# Patient Record
Sex: MALE | Race: WHITE | NOT HISPANIC OR LATINO | Employment: FULL TIME | URBAN - METROPOLITAN AREA
[De-identification: names, ages, dates, MRNs, and addresses within clinical notes are randomized per-mention and may not be internally consistent; named-entity substitution may affect disease eponyms.]

---

## 2017-02-21 ENCOUNTER — TRANSFERRED RECORDS (OUTPATIENT)
Dept: FAMILY MEDICINE | Facility: CLINIC | Age: 62
End: 2017-02-21

## 2017-02-21 ENCOUNTER — TELEPHONE (OUTPATIENT)
Dept: FAMILY MEDICINE | Facility: CLINIC | Age: 62
End: 2017-02-21

## 2017-02-21 DIAGNOSIS — M54.50 LUMBAGO: Primary | ICD-10-CM

## 2017-03-06 ENCOUNTER — OFFICE VISIT (OUTPATIENT)
Dept: FAMILY MEDICINE | Facility: CLINIC | Age: 62
End: 2017-03-06

## 2017-03-06 ENCOUNTER — CARE COORDINATION (OUTPATIENT)
Dept: CASE MANAGEMENT | Facility: CLINIC | Age: 62
End: 2017-03-06

## 2017-03-06 VITALS
DIASTOLIC BLOOD PRESSURE: 76 MMHG | SYSTOLIC BLOOD PRESSURE: 142 MMHG | HEART RATE: 66 BPM | WEIGHT: 190 LBS | OXYGEN SATURATION: 98 % | BODY MASS INDEX: 29.32 KG/M2

## 2017-03-06 DIAGNOSIS — M54.5 CHRONIC LOW BACK PAIN, UNSPECIFIED BACK PAIN LATERALITY, WITH SCIATICA PRESENCE UNSPECIFIED: Primary | ICD-10-CM

## 2017-03-06 DIAGNOSIS — G89.29 CHRONIC LOW BACK PAIN, UNSPECIFIED BACK PAIN LATERALITY, WITH SCIATICA PRESENCE UNSPECIFIED: Primary | ICD-10-CM

## 2017-03-06 PROCEDURE — 99213 OFFICE O/P EST LOW 20 MIN: CPT | Performed by: FAMILY MEDICINE

## 2017-03-06 NOTE — MR AVS SNAPSHOT
After Visit Summary   3/6/2017    Mane Chawla    MRN: [id number]           Patient Information     Date Of Birth          1955        Visit Information        Provider Department      3/6/2017 2:15 PM Johnnie Dejesus MD [facility]        Today's Diagnoses     Chronic low back pain, unspecified back pain laterality, with sciatica presence unspecified    -  1       Follow-ups after your visit        Additional Services     PHYSIATRY REFERRAL       Your provider [facility] you [facility]    Please be aware that coverage of these services is subject to the terms and limitations of your health insurance plan.  Call member services at your health plan with any benefit or coverage questions.      Please bring the following to your appointment:  >>   Any x-rays, CTs or MRIs which have been performed.  Contact the facility where they were done to arrange for  prior to your scheduled appointment.    >>   List of current medications   >>   This referral request   >>   Any documents/labs given to you for this referral                  Who to contact     If you have questions or need follow up information about today's clinic visit or your schedule please contact [facility] directly at [phone].  Normal or non-critical lab and imaging results will be communicated to you by MyChart, letter or phone within 4 business days after the clinic has received the results. If you do not hear from us within 7 days, please contact the clinic through PhysioSonicshart or phone. If you have a critical or abnormal lab result, we will notify you by phone as soon as possible.  Submit refill requests through [employer] or call your pharmacy and they will forward the refill request to us. Please allow 3 business days for your refill to be completed.          Additional Information About Your Visit        PhysioSonicshart Information     [employer]Life lets you send messages to your  "doctor, view your test results, renew your prescriptions, schedule appointments and more. To sign up, go to [email or web address] . Click on \"Log in\" on the left side of the screen, which will take you to the Welcome page. Then click on \"Sign up Now\" on the right side of the page.     You will be asked to enter the access code listed below, as well as some personal information. Please follow the directions to create your username and password.     Your access code is: [email or web address]  Expires: 2017  3:09 PM     Your access code will  in 90 days. If you need help or a new code, please call your [place] clinic or [phone].        Care EveryWhere ID     This is your Care EveryWhere ID. This could be used by other organizations to access your [place] medical records  [id number]        Your Vitals Were     Pulse Pulse Oximetry BMI (Body Mass Index)             66 98% 29.32 kg/m2          Blood Pressure from Last 3 Encounters:   17 142/76   16 142/82   11/10/16 158/80    Weight from Last 3 Encounters:   17 86.2 kg (190 lb)   16 83 kg (183 lb)   11/10/16 83.9 kg (185 lb)              We Performed the Following     PHYSIATRY REFERRAL        Primary Care Provider Office Phone # Fax #    Johnnie Dejesus [phone]       [facility] [address] [facility] [address]        Thank you!     Thank you for choosing [facility]  for your care. Our goal is always to provide you with excellent care. Hearing back from our patients is one way we can continue to improve our services. Please take a few minutes to complete the written survey that you may receive in the mail after your visit with us. Thank you!             Your Updated Medication List - Protect others around you: Learn how to safely use, store and throw away your medicines at www.disposemymeds.org.          This list is accurate as of: 3/6/17  3:09 PM.  Always use your most recent med " list.                   Brand Name Dispense Instructions for use    ADVIL PO          CIMETIDINE ACID REDUCER PO      Take 1 tablet by mouth every morning.       diazepam 10 MG tablet    VALIUM    30 tablet    TAKE 1 TABLET BY MOUTH NIGHTLY AS NEEDED FOR SLEEP OR MUSCLE SPASMS       ENBREL SURECLICK 50 MG/ML autoinjector   Generic drug:  Etanercept          fish oil-omega-3 fatty acids 1000 MG capsule      Take 2 g by mouth daily       oxyCODONE-acetaminophen 5-325 MG per tablet    PERCOCET    90 tablet    Take 1 tablet by mouth every 4 hours as needed for moderate to severe pain

## 2017-03-06 NOTE — PROGRESS NOTES
Here for referral to a physician at [facility]. He also had ?'s about applying for disability from the state. 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  >15 min>50% counseling

## 2017-03-07 NOTE — PROGRESS NOTES
Clinic Care Coordination Contact  OUTREACH    Referral Information:  Referral Source: PCP  Reason for Contact: Inquiry about SS disability and Medicare benefits  Care Conference: No     Universal Utilization:   ED Visits in last year: 0  Hospital visits in last year: 0  Last PCP appointment: 03/07/17  Missed Appointments: 0  Concerns: Proper Utilization  Multiple Providers or Specialists: Yes    Clinical Concerns:  Current Medical Concerns:   Patient Active Problem List   Diagnosis     Psoriatic arthritis (H)     Hyperlipidemia LDL goal <160     ACP (advance care planning)     Health Care Home     Midline low back pain without sciatica     Burning mouth syndrome       Current Behavioral Concerns: NA    Education Provided to patient: Social Security administration office near pt's home,  website with online application and helpful information about the process, Diability Specialists, and Disability Linkage Line, CC contact information   Clinical Pathway: None    Medication Management:  Patient understands and is adherent     Functional Status:  Mobility Status: Independent  Equipment Currently Used at Home: none  Transportation: Pt drives      Psychosocial:  Current living arrangement:: I live in a private home with spouse  Financial/Insurance: WC, not discussed, Pt currently working     Resources and Interventions:  Current Resources:  (PT);  (NA)  PAS Number:  (NA)  Senior Linkage Line Referral Placed:  (NA)  Advanced Care Plans/Directives on file:: No     Patient/Caregiver understanding: Patient suffers from chronic back pain and was interested in retiring early and applying for disability. Provided information about the process via e-mail and helpful sites, included  CC contact information if patient has any follow-up questions or needs additional assistance.   Frequency of Care Coordination: as needed     Plan: Pt will review disability information and call with any questions. SW CC to remain available in  clinic if needed.    Glory [facility]  Clinic Care Coordinator  [facility]/ [facility]  [phone]

## 2017-04-06 ENCOUNTER — TRANSFERRED RECORDS (OUTPATIENT)
Dept: FAMILY MEDICINE | Facility: CLINIC | Age: 62
End: 2017-04-06

## 2017-04-11 ENCOUNTER — TRANSFERRED RECORDS (OUTPATIENT)
Dept: FAMILY MEDICINE | Facility: CLINIC | Age: 62
End: 2017-04-11

## 2017-04-18 ENCOUNTER — TRANSFERRED RECORDS (OUTPATIENT)
Dept: FAMILY MEDICINE | Facility: CLINIC | Age: 62
End: 2017-04-18

## 2017-04-27 ENCOUNTER — OFFICE VISIT (OUTPATIENT)
Dept: FAMILY MEDICINE | Facility: CLINIC | Age: 62
End: 2017-04-27

## 2017-04-27 VITALS
WEIGHT: 185 LBS | HEIGHT: 67 IN | BODY MASS INDEX: 29.03 KG/M2 | SYSTOLIC BLOOD PRESSURE: 132 MMHG | OXYGEN SATURATION: 98 % | HEART RATE: 83 BPM | DIASTOLIC BLOOD PRESSURE: 80 MMHG

## 2017-04-27 DIAGNOSIS — M54.50 CHRONIC BILATERAL LOW BACK PAIN WITHOUT SCIATICA: ICD-10-CM

## 2017-04-27 DIAGNOSIS — G89.29 CHRONIC BILATERAL LOW BACK PAIN WITHOUT SCIATICA: ICD-10-CM

## 2017-04-27 PROCEDURE — 99213 OFFICE O/P EST LOW 20 MIN: CPT | Performed by: FAMILY MEDICINE

## 2017-04-27 RX ORDER — OXYCODONE AND ACETAMINOPHEN 5; 325 MG/1; MG/1
1 TABLET ORAL EVERY 4 HOURS PRN
Qty: 90 TABLET | Refills: 0 | Status: SHIPPED | OUTPATIENT
Start: 2017-04-27 | End: 2017-09-05

## 2017-04-27 RX ORDER — MULTIPLE VITAMINS W/ MINERALS TAB 9MG-400MCG
1 TAB ORAL DAILY
COMMUNITY
End: 2019-08-13

## 2017-04-27 NOTE — LETTER
[facility]    04/27/17    Patient: Mane Chawla  YOB: 1955  Medical Record Number: [id number]                                                                  Controlled Substance Agreement  I understand that my care provider has prescribed controlled substances (narcotics, tranquilizers, and/or stimulants) to help manage my condition(s).  I am taking this medicine to help me function or work.  I know that this is strong medicine.  It could have serious side effects and even cause a dependency on the drug.  If I stop these medicines suddenly, I could have severe withdrawal symptoms.    The risks, benefits, and side effects of these medication(s) were explained to me.  I agree that:  1. I will take part in other treatments as advised by my provider.  This may be psychiatry or counseling, physical therapy, behavioral therapy, group treatment, or a referral to a pain clinic.  I will reduce or stop my medicine when my provider tells me to do so.   2. I will take my medicines as prescribed.  I will not change the dose or schedule unless my provider tells me to.  There will be no refills if I  run out early.   I may be contacted at any time without warning and asked to complete a drug test or pill count.   3. I will keep all my appointments at the clinic.  If I miss appointments or fail to follow instructions, my provider may stop my medicine.  4. I will not ask other providers to prescribe controlled substances. And I will not accept controlled substances from other people. If I need another prescribed controlled substance for a new reason, I will notify my provider within one business day.  5. If I enroll in the [place] Medical Marijuana program, I will tell my provider.  I will also sign an agreement to share my medical records with my provider.  6. I will use one pharmacy to fill all of my controlled substance prescriptions.  If my prescription is mailed to my pharmacy, it may take 5  to 7 days for my medicine to be ready.  7. I understand that my provider, clinic care team, and pharmacy can track controlled substance prescriptions from other providers through a central database (prescription monitoring program).  8. I will bring in my list of medications (or my medicine bottles) each time I come to the clinic.  REV-  04/2016                                                                                                                                            Page 1 of 2      [facility]    04/27/17    Patient: Mane Chawla  YOB: 1955  Medical Record Number: [id number]    9. Refills of controlled substances will be made only during office hours.  It is up to me to make sure that I do not run out of my medicines on weekends or holidays.    10. I am responsible for my prescriptions.  If the medicine is lost or stolen, it will not be replaced.   I also agree not to share these medicines with anyone.  11. I agree to not use ANY illegal or recreational drugs.  This includes marijuana, cocaine, bath salts or other drugs.  I agree not to use alcohol unless my provider says I may.  I agree to give urine samples whenever asked.  If I fail to give a urine sample, the provider may stop my medicine.     12. I will tell my nurse or provider right away if I become pregnant or have a new medical problem treated outside of [facility].  13. I understand that this medicine can affect my thinking and judgment.  It may be unsafe for me to drive, use machinery and do dangerous tasks.  I will not do any of these things until I know how the medicine affects me.  If my dose changes, I will wait to see how it affects me.  I will contact my provider if I have concerns about medicine side effects.  I understand that if I do not follow any of the conditions above, my prescriptions or treatment may be stopped.    I agree that my provider, clinic care team, and pharmacy may work with any  city, state or federal law enforcement agency that investigates the misuse, sale, or other diversion of my controlled medicine. I will allow my provider to discuss my care with or share a copy of this agreement with any other treating provider, pharmacy or emergency room where I receive care.  I agree to give up (waive) any right of privacy or confidentiality with respect to these authorizations.   I have read this agreement and have asked questions about anything I did not understand.   ___________________________________    ___________________________  Patient Signature                                                           Date and Time  ___________________________________     ____________________________  Witness                                                                            Date and Time  ___________________________________  Johnnie Dejesus MD  REV-  04/2016                                                                                                                                                                 Page 2 of 2

## 2017-04-27 NOTE — PROGRESS NOTES
"Here for refill of his back pain medications. He has been using appropriately. #90 in 6 months  OBJECTIVE: /80  Pulse 83  Ht 1.702 m (5' 7\")  Wt 83.9 kg (185 lb)  SpO2 98%  BMI 28.98 kg/m2 NAD good insight and mood. Carries himself stiffly  (M54.5,  G89.29) Chronic bilateral low back pain without sciatica  Comment:   Plan: oxyCODONE-acetaminophen (PERCOCET) 5-325 MG per        Tablet #90 use prn        Narcotic agreement signed.       "

## 2017-08-07 ENCOUNTER — CARE COORDINATION (OUTPATIENT)
Dept: CARE COORDINATION | Facility: CLINIC | Age: 62
End: 2017-08-07

## 2017-08-07 NOTE — PROGRESS NOTES
Clinic Care Coordination Contact  OUTREACH    Pt called and left a voicemail requesting that information regarding disability be e-mailed again. E-mail sent to his wife's e-mail address per pt request. JUNIOR PUGA will remain available to pt to answer any questions he may have.    ABIGAIL Chinchilla  Clinic Care Coordinator  [phone]  [email or web address]

## 2017-08-30 ENCOUNTER — TELEPHONE (OUTPATIENT)
Dept: FAMILY MEDICINE | Facility: CLINIC | Age: 62
End: 2017-08-30

## 2017-09-05 ENCOUNTER — OFFICE VISIT (OUTPATIENT)
Dept: FAMILY MEDICINE | Facility: CLINIC | Age: 62
End: 2017-09-05

## 2017-09-05 VITALS
DIASTOLIC BLOOD PRESSURE: 72 MMHG | BODY MASS INDEX: 29.19 KG/M2 | OXYGEN SATURATION: 97 % | HEART RATE: 80 BPM | WEIGHT: 186.4 LBS | SYSTOLIC BLOOD PRESSURE: 140 MMHG | RESPIRATION RATE: 20 BRPM

## 2017-09-05 DIAGNOSIS — G89.29 CHRONIC BILATERAL LOW BACK PAIN WITHOUT SCIATICA: ICD-10-CM

## 2017-09-05 DIAGNOSIS — M54.50 CHRONIC BILATERAL LOW BACK PAIN WITHOUT SCIATICA: ICD-10-CM

## 2017-09-05 DIAGNOSIS — Z23 NEED FOR PROPHYLACTIC VACCINATION AND INOCULATION AGAINST INFLUENZA: Primary | ICD-10-CM

## 2017-09-05 PROCEDURE — 99213 OFFICE O/P EST LOW 20 MIN: CPT | Mod: 25 | Performed by: FAMILY MEDICINE

## 2017-09-05 PROCEDURE — 90686 IIV4 VACC NO PRSV 0.5 ML IM: CPT | Performed by: FAMILY MEDICINE

## 2017-09-05 RX ORDER — OXYCODONE AND ACETAMINOPHEN 5; 325 MG/1; MG/1
1 TABLET ORAL EVERY 4 HOURS PRN
Qty: 90 TABLET | Refills: 0 | Status: SHIPPED | OUTPATIENT
Start: 2017-09-05 | End: 2018-04-03

## 2017-09-05 NOTE — PROGRESS NOTES
Back pain chronic. He uses oxycodone 1/2 to 1 a day. He needs to supplement that with ibuprofen and acetaminophen. He does have a history of esophagitis, and needs to take OTC PPI or H2 blocker. He will have shoulder surgery in October. He apparently did not make an appointment with Dr. Garza, as he thought that Dr. Garza's office would call.  ./72  Pulse 80  Resp 20  Wt 84.6 kg (186 lb 6.4 oz)  SpO2 97%  BMI 29.19 kg/m2 he looks fatigued. Cooperative and insightful.  (Z23) Need for prophylactic vaccination and inoculation against influenza  (primary encounter diagnosis)  Comment:   Plan: FLU VAC, SPLIT VIRUS IM > 3 YO (QUADRIVALENT)         [07796]            (M54.5,  G89.29) Chronic bilateral low back pain without sciatica  Comment: with physical job  Plan: oxyCODONE-acetaminophen (PERCOCET) 5-325 MG per        tablet, Referral to Anthony Garza M.D. - Physical         Medicine & Rehab        Use the oxycodone as before, one daily prn. He still may need disability determination, as I don't think he

## 2017-09-05 NOTE — PROGRESS NOTES
Injectable Influenza Immunization Documentation    1.  Is the person to be vaccinated sick today?  No    2. Does the person to be vaccinated have an allergy to eggs or to a component of the vaccine?  No    3. Has the person to be vaccinated today ever had a serious reaction to influenza vaccine in the past?  No    4. Has the person to be vaccinated ever had Guillain-[place] syndrome?  No     Form completed by Lissette Merrill MA September 5, 2017 11:19 AM

## 2017-09-05 NOTE — MR AVS SNAPSHOT
"              After Visit Summary   9/5/2017    Mane Chawla    MRN: [id number]           Patient Information     Date Of Birth          1955        Visit Information        Provider Department      9/5/2017 11:00 AM Johnnie Dejesus MD [facility]        Today's Diagnoses     Need for prophylactic vaccination and inoculation against influenza    -  1    Chronic bilateral low back pain without sciatica           Follow-ups after your visit        Additional Services     Referral to Anthony Garza M.D. - Physical Medicine & Rehab       Referral to Anthony Garza M.D. - Physical Medicine & Rehab  Phone:  [phone]  Reason for Referral:  consult    Please be aware that coverage of these services is subject to the terms and limitations of your health insurance plan.  Call member services at your health plan with any benefit or coverage questions.                  Who to contact     If you have questions or need follow up information about today's clinic visit or your schedule please contact [facility] directly at [phone].  Normal or non-critical lab and imaging results will be communicated to you by SocialKatyhart, letter or phone within 4 business days after the clinic has received the results. If you do not hear from us within 7 days, please contact the clinic through SocialKatyhart or phone. If you have a critical or abnormal lab result, we will notify you by phone as soon as possible.  Submit refill requests through "[employer] or call your pharmacy and they will forward the refill request to us. Please allow 3 business days for your refill to be completed.          Additional Information About Your Visit        SocialKatyhart Information     "[employer] lets you send messages to your doctor, view your test results, renew your prescriptions, schedule appointments and more. To sign up, go to [email or web address] . Click on \"Log in\" on the left side of the screen, which will take you to the Welcome page. " "Then click on \"Sign up Now\" on the right side of the page.     You will be asked to enter the access code listed below, as well as some personal information. Please follow the directions to create your username and password.     Your access code is: [email or web address]  Expires: 2017 11:45 AM     Your access code will  in 90 days. If you need help or a new code, please call your [place] clinic or [phone].        Care EveryWhere ID     This is your Care EveryWhere ID. This could be used by other organizations to access your [place] medical records  [id number]        Your Vitals Were     Pulse Respirations Pulse Oximetry BMI (Body Mass Index)          80 20 97% 29.19 kg/m2         Blood Pressure from Last 3 Encounters:   17 140/72   17 132/80   17 142/76    Weight from Last 3 Encounters:   17 84.6 kg (186 lb 6.4 oz)   17 83.9 kg (185 lb)   17 86.2 kg (190 lb)              We Performed the Following     FLU VAC, SPLIT VIRUS IM > 3 YO (QUADRIVALENT) [35608]     Referral to Anthony Garza M.D. - Physical Medicine & Rehab          Where to get your medicines      Some of these will need a paper prescription and others can be bought over the counter.  Ask your nurse if you have questions.     Bring a paper prescription for each of these medications     oxyCODONE-acetaminophen 5-325 MG per tablet          Primary Care Provider Office Phone # Fax #    Johnnie Dejesus [phone] [address] [facility] [address]        Equal Access to Services     ANNIA [employer] GroupMATILDE : Hadconnor Coker, yousif gleason, qaybta chungalkenzie benitez. So [facility] [phone].    ATENCIÓN: Si habla español, tiene a morales disposición servicios gratuitos de asistencia lingüística. Llame al [phone].    We comply with applicable federal civil rights laws and [place] laws. We do not discriminate on the basis of race, color, " national origin, age, disability sex, sexual orientation or gender identity.            Thank you!     Thank you for choosing [facility]  for your care. Our goal is always to provide you with excellent care. Hearing back from our patients is one way we can continue to improve our services. Please take a few minutes to complete the written survey that you may receive in the mail after your visit with us. Thank you!             Your Updated Medication List - Protect others around you: Learn how to safely use, store and throw away your medicines at www.disposemymeds.org.          This list is accurate as of: 9/5/17 11:45 AM.  Always use your most recent med list.                   Brand Name Dispense Instructions for use Diagnosis    ADVIL PO      Take 800 mg by mouth 2 times daily        CIMETIDINE ACID REDUCER PO      Take 1 tablet by mouth every morning.        diazepam 10 MG tablet    VALIUM    30 tablet    TAKE 1 TABLET BY MOUTH NIGHTLY AS NEEDED FOR SLEEP OR MUSCLE SPASMS    Encounter for medication refill       ENBREL SURECLICK 50 MG/ML autoinjector   Generic drug:  Etanercept      Inject 50 mg Subcutaneous once a week        fish oil-omega-3 fatty acids 1000 MG capsule      Take 2 g by mouth daily        Multi-vitamin Tabs tablet      Take 1 tablet by mouth daily        oxyCODONE-acetaminophen 5-325 MG per tablet    PERCOCET    90 tablet    Take 1 tablet by mouth every 4 hours as needed for moderate to severe pain    Chronic bilateral low back pain without sciatica       UNABLE TO FIND      MEDICATION NAME: Tumeric

## 2017-09-11 ENCOUNTER — TRANSFERRED RECORDS (OUTPATIENT)
Dept: FAMILY MEDICINE | Facility: CLINIC | Age: 62
End: 2017-09-11

## 2017-09-15 ENCOUNTER — TRANSFERRED RECORDS (OUTPATIENT)
Dept: FAMILY MEDICINE | Facility: CLINIC | Age: 62
End: 2017-09-15

## 2017-09-28 ENCOUNTER — TRANSFERRED RECORDS (OUTPATIENT)
Dept: FAMILY MEDICINE | Facility: CLINIC | Age: 62
End: 2017-09-28

## 2017-10-13 ENCOUNTER — TRANSFERRED RECORDS (OUTPATIENT)
Dept: FAMILY MEDICINE | Facility: CLINIC | Age: 62
End: 2017-10-13

## 2017-10-20 DIAGNOSIS — Z76.0 ENCOUNTER FOR MEDICATION REFILL: ICD-10-CM

## 2017-10-20 RX ORDER — DIAZEPAM 10 MG
TABLET ORAL
Qty: 30 TABLET | Refills: 0 | Status: SHIPPED | OUTPATIENT
Start: 2017-10-20 | End: 2018-03-13

## 2017-10-20 NOTE — TELEPHONE ENCOUNTER
Pending Prescriptions:                       Disp   Refills    diazepam (VALIUM) 10 MG tablet [Pharmacy M*30 tab*0        Sig: TAKE 1 TABLET BY MOUTH EVERY NIGHT AT BEDTIME AS           NEEDED FOR SLEEP OR MUSCLE SPASMS    Last OV 9/5/17  Lab Results   Component Value Date    WBC 7.5 08/23/2016     Lab Results   Component Value Date    RBC 4.46 08/23/2016     Lab Results   Component Value Date    HGB 13.8 08/23/2016     Lab Results   Component Value Date    HCT 40.5 08/23/2016     No components found for: MCT  Lab Results   Component Value Date    MCV 90.7 08/23/2016     Lab Results   Component Value Date    MCH 31.1 08/23/2016     Lab Results   Component Value Date    MCHC 34.2 08/23/2016     No results found for: RDW  Lab Results   Component Value Date     08/23/2016     Last Basic Metabolic Panel:  Lab Results   Component Value Date     07/18/2014      Lab Results   Component Value Date    POTASSIUM 4.7 07/18/2014     Lab Results   Component Value Date    CHLORIDE 103 07/18/2014     Lab Results   Component Value Date    LEIGHA 9.2 07/18/2014     No results found for: CO2  Lab Results   Component Value Date    BUN 17 07/18/2014    BUN 24 07/18/2014     Lab Results   Component Value Date    CR 0.72 07/18/2014     Lab Results   Component Value Date     08/23/2016     Lab Results   Component Value Date    CHOL 262 07/18/2014     Lab Results   Component Value Date    HDL 44 07/18/2014     Lab Results   Component Value Date     07/18/2014     Lab Results   Component Value Date    TRIG 220 07/18/2014     No results found for: CHOLHDLRATIO  No results found for: TSH]

## 2017-11-08 ENCOUNTER — OFFICE VISIT (OUTPATIENT)
Dept: FAMILY MEDICINE | Facility: CLINIC | Age: 62
End: 2017-11-08

## 2017-11-08 VITALS
HEART RATE: 74 BPM | WEIGHT: 186 LBS | DIASTOLIC BLOOD PRESSURE: 96 MMHG | RESPIRATION RATE: 16 BRPM | OXYGEN SATURATION: 98 % | SYSTOLIC BLOOD PRESSURE: 156 MMHG | TEMPERATURE: 98.2 F | BODY MASS INDEX: 29.13 KG/M2

## 2017-11-08 DIAGNOSIS — L40.50 PSORIATIC ARTHRITIS (H): ICD-10-CM

## 2017-11-08 DIAGNOSIS — M75.101 ROTATOR CUFF SYNDROME, RIGHT: ICD-10-CM

## 2017-11-08 DIAGNOSIS — Z01.818 PREOP GENERAL PHYSICAL EXAM: Primary | ICD-10-CM

## 2017-11-08 LAB
% GRANULOCYTES: 72.4 % (ref 42.2–75.2)
HCT VFR BLD AUTO: 42.1 % (ref 39–51)
HEMOGLOBIN: 13.8 G/DL (ref 13.4–17.5)
LYMPHOCYTES NFR BLD AUTO: 20.5 % (ref 20.5–51.1)
MCH RBC QN AUTO: 31 PG (ref 27–31)
MCHC RBC AUTO-ENTMCNC: 32.9 G/DL (ref 33–37)
MCV RBC AUTO: 94.1 FL (ref 80–100)
MONOCYTES NFR BLD AUTO: 7.1 % (ref 1.7–9.3)
PLATELET # BLD AUTO: 201 K/UL (ref 140–450)
RBC # BLD AUTO: 4.47 X10/CMM (ref 4.2–5.9)
WBC # BLD AUTO: 8.8 X10/CMM (ref 3.8–11)

## 2017-11-08 PROCEDURE — 93000 ELECTROCARDIOGRAM COMPLETE: CPT | Performed by: FAMILY MEDICINE

## 2017-11-08 PROCEDURE — 99215 OFFICE O/P EST HI 40 MIN: CPT | Performed by: FAMILY MEDICINE

## 2017-11-08 PROCEDURE — 36415 COLL VENOUS BLD VENIPUNCTURE: CPT | Performed by: FAMILY MEDICINE

## 2017-11-08 PROCEDURE — 85025 COMPLETE CBC W/AUTO DIFF WBC: CPT | Performed by: FAMILY MEDICINE

## 2017-11-08 NOTE — MR AVS SNAPSHOT
After Visit Summary   11/8/2017    Mane Chawla    MRN: [id number]           Patient Information     Date Of Birth          1955        Visit Information        Provider Department      11/8/2017 2:00 PM Johnnie Dejesus MD [facility]        Today's Diagnoses     Preop general physical exam    -  1    Rotator cuff syndrome, right        Psoriatic arthritis (H)          Care Instructions      Before Your Surgery      Call your surgeon if there is any change in your health. This includes signs of a cold or flu (such as a sore throat, runny nose, cough, rash or fever).    Do not smoke, drink alcohol or take over the counter medicine (unless your surgeon or primary care doctor tells you to) for the 24 hours before and after surgery.    If you take prescribed drugs: Follow your doctor s orders about which medicines to take and which to stop until after surgery.    Eating and drinking prior to surgery: follow the instructions from your surgeon    Take a shower or bath the night before surgery. Use the soap your surgeon gave you to gently clean your skin. If you do not have soap from your surgeon, use your regular soap. Do not shave or scrub the surgery site.  Wear clean pajamas and have clean sheets on your bed.           Follow-ups after your visit        Who to contact     If you have questions or need follow up information about today's clinic visit or your schedule please contact [facility] directly at [phone].  Normal or non-critical lab and imaging results will be communicated to you by MyChart, letter or phone within 4 business days after the clinic has received the results. If you do not hear from us within 7 days, please contact the clinic through MyChart or phone. If you have a critical or abnormal lab result, we will notify you by phone as soon as possible.  Submit refill requests through [employer] or call your pharmacy and they will forward the refill  "request to us. Please allow 3 business days for your refill to be completed.          Additional Information About Your Visit        MyChart Information     [employer] lets you send messages to your doctor, view your test results, renew your prescriptions, schedule appointments and more. To sign up, go to [email or web address] . Click on \"Log in\" on the left side of the screen, which will take you to the Welcome page. Then click on \"Sign up Now\" on the right side of the page.     You will be asked to enter the access code listed below, as well as some personal information. Please follow the directions to create your username and password.     Your access code is: [email or web address]  Expires: 2017 10:45 AM     Your access code will  in 90 days. If you need help or a new code, please call your [place] clinic or [phone].        Care EveryWhere ID     This is your Care EveryWhere ID. This could be used by other organizations to access your [place] medical records  [id number]        Your Vitals Were     Pulse Temperature Respirations Pulse Oximetry BMI (Body Mass Index)       74 98.2  F (36.8  C) (Oral) 16 98% 29.13 kg/m2        Blood Pressure from Last 3 Encounters:   17 (!) 156/96   17 140/72   17 132/80    Weight from Last 3 Encounters:   17 84.4 kg (186 lb)   17 84.6 kg (186 lb 6.4 oz)   17 83.9 kg (185 lb)              We Performed the Following     CBC with Diff/Plt ([facility])     EKG 12-lead complete w/read - Clinics        Primary Care Provider Office Phone # Fax #    Johnnie Dejesus [phone] [address] [facility] [address]        Equal Access to Services     DANIEL BAIG : Demarco Coker, yousif gleason, qamanasa kakenzie arciniega. [facility] [phone].    ATENCIÓN: Si habla español, tiene a morales disposición servicios gratuitos de asistencia lingüística. Llame al " [phone].    We comply with applicable federal civil rights laws and [place] laws. We do not discriminate on the basis of race, color, national origin, age, disability, sex, sexual orientation, or gender identity.            Thank you!     Thank you for choosing [facility]  for your care. Our goal is always to provide you with excellent care. Hearing back from our patients is one way we can continue to improve our services. Please take a few minutes to complete the written survey that you may receive in the mail after your visit with us. Thank you!             Your Updated Medication List - Protect others around you: Learn how to safely use, store and throw away your medicines at www.disposemymeds.org.          This list is accurate as of: 11/8/17  4:16 PM.  Always use your most recent med list.                   Brand Name Dispense Instructions for use Diagnosis    ADVIL PO      Take 800 mg by mouth 2 times daily        CIMETIDINE ACID REDUCER PO      Take 1 tablet by mouth every morning.        diazepam 10 MG tablet    VALIUM    30 tablet    TAKE 1 TABLET BY MOUTH EVERY NIGHT AT BEDTIME AS NEEDED FOR SLEEP OR MUSCLE SPASMS    Encounter for medication refill       ENBREL SURECLICK 50 MG/ML autoinjector   Generic drug:  Etanercept      Inject 50 mg Subcutaneous once a week        fish oil-omega-3 fatty acids 1000 MG capsule      Take 2 g by mouth daily        Multi-vitamin Tabs tablet      Take 1 tablet by mouth daily        oxyCODONE-acetaminophen 5-325 MG per tablet    PERCOCET    90 tablet    Take 1 tablet by mouth every 4 hours as needed for moderate to severe pain    Chronic bilateral low back pain without sciatica       UNABLE TO FIND      MEDICATION NAME: Tumeric

## 2017-11-08 NOTE — PROGRESS NOTES
[facility]  [address] [place] [id number] [phone]  Dept: [phone]    PRE-OP EVALUATION:  Today's date: 2017    Mane Chawla (: 1955) presents for pre-operative evaluation assessment as requested by Dr. Colbert .  He requires evaluation and anesthesia risk assessment prior to undergoing surgery/procedure for treatment of Psoriatic arthritis .  Proposed procedure: Clean out of the right shoulder     Date of Surgery/ Procedure:  2017  Time of Surgery/ Procedure: To be to determine   Hospital/Surgical Facility: [facility]   Primary Physician: Johnnie Dejesus  Type of Anesthesia Anticipated: General    Patient has a Health Care Directive or Living Will:  YES     1. NO - Do you have a history of heart attack, stroke, stent, bypass or surgery on an artery in the head, neck, heart or legs?  2. NO - Do you ever have any pain or discomfort in your chest?  3. NO - Do you have a history of  Heart Failure?  4. NO - Are you troubled by shortness of breath when: walking on the level, up a slight hill or at night?  5. NO - Do you currently have a cold, bronchitis or other respiratory infection?  6. NO - Do you have a cough, shortness of breath or wheezing?  7. NO - Do you sometimes get pains in the calves of your legs when you walk?  8. NO - Do you or anyone in your family have previous history of blood clots?  9. NO - Do you or does anyone in your family have a serious bleeding problem such as prolonged bleeding following surgeries or cuts?  10. NO - Have you ever had problems with anemia or been told to take iron pills?  11. NO - Have you had any abnormal blood loss such as black, tarry or bloody stools, or abnormal vaginal bleeding?  12. NO - Have you ever had a blood transfusion?  13. NO - Have you or any of your relatives ever had problems with anesthesia?  14. NO - Do you have sleep apnea, excessive snoring or daytime drowsiness?  15. NO - Do you  have any prosthetic heart valves?  16. NO - Do you have prosthetic joints?          HPI:                                                      Brief HPI related to upcoming procedure: longstanding shoulder pain      62 yo male with history of psoriatic arthritis, hyperlipidemia without know vascular disease    MEDICAL HISTORY:                                                    Patient Active Problem List    Diagnosis Date Noted     Burning mouth syndrome 08/23/2016     Priority: Medium     2/16       Midline low back pain without sciatica 10/26/2015     Priority: Medium     Health Care Home 10/29/2013     Priority: Medium     State Tier Level:  Tier 1   August 25, 2014             ACP (advance care planning)      Priority: Medium     Hyperlipidemia LDL goal <160 06/15/2011     Priority: Medium     Psoriatic arthritis (H)      Priority: Medium      Past Medical History:   Diagnosis Date     ACP (advance care planning) 3-25-13    form given     GERD (gastroesophageal reflux disease)      HTN, goal below 140/80      Hypercholesterolemia      Hyperlipidemia LDL goal <160 6/15/2011     Psoriatic arthritis (H)      Past Surgical History:   Procedure Laterality Date     ARTHROSCOPY KNEE RT/LT       CARPAL TUNNEL RELEASE RT/LT       FUSION CERVICAL ANTERIOR THREE+ LEVELS       FUSION LUMBAR ANTERIOR ONE LEVEL       ROTATOR CUFF REPAIR RT/LT       Current Outpatient Prescriptions   Medication Sig Dispense Refill     diazepam (VALIUM) 10 MG tablet TAKE 1 TABLET BY MOUTH EVERY NIGHT AT BEDTIME AS NEEDED FOR SLEEP OR MUSCLE SPASMS 30 tablet 0     multivitamin, therapeutic with minerals (MULTI-VITAMIN) TABS tablet Take 1 tablet by mouth daily       ENBREL SURECLICK 50 MG/ML autoinjector Inject 50 mg Subcutaneous once a week   0     Ibuprofen (ADVIL PO) Take 800 mg by mouth 2 times daily        CIMETIDINE ACID REDUCER PO Take 1 tablet by mouth every morning.       oxyCODONE-acetaminophen (PERCOCET) 5-325 MG per tablet Take 1  "tablet by mouth every 4 hours as needed for moderate to severe pain (Patient not taking: Reported on 11/8/2017) 90 tablet 0     UNABLE TO FIND MEDICATION NAME: Tumeric       fish oil-omega-3 fatty acids (FISH OIL) 1000 MG capsule Take 2 g by mouth daily       OTC products: None, except as noted above    Allergies   Allergen Reactions     Cipro [Quinolones] Other (See Comments)     Achy joints     Codeine Nausea     per h&p     Codeine Camsylate Nausea     Fluocinolone Other (See Comments)     \"achy joints\"     Hmg-Coa-R Inhibitors Other (See Comments)     As provastatin- fatigue  On rovustatin  As of 10/1/14      Latex Allergy: NO    Social History   Substance Use Topics     Smoking status: Former Smoker     Packs/day: 1.00     Years: 30.00     Types: Cigarettes     Quit date: 3/21/1996     Smokeless tobacco: Never Used     Alcohol use 5.0 oz/week     10 drink(s) per week     History   Drug Use No       REVIEW OF SYSTEMS:                                                    Review Of Systems  Skin: psoriasis  Eyes: negative  Ears/Nose/Throat: negative  Respiratory: No shortness of breath, dyspnea on exertion, cough, or hemoptysis  Cardiovascular: negative  Gastrointestinal: negative  Genitourinary: negative  Musculoskeletal: back pain, neck pain and arthritis  Neurologic: negative  Psychiatric: negative  Hematologic/Lymphatic/Immunologic: negative  Endocrine: negative      EXAM:                                                    BP (!) 156/96  Pulse 74  Temp 98.2  F (36.8  C) (Oral)  Resp 16  Wt 84.4 kg (186 lb)  SpO2 98%  BMI 29.13 kg/m2    GENERAL APPEARANCE: healthy, alert and no distress     EYES: EOMI,  PERRL     HENT: ear canals and TM's normal and nose and mouth without ulcers or lesions     NECK: no adenopathy, no asymmetry, masses, or scars and thyroid normal to palpation     RESP: lungs clear to auscultation - no rales, rhonchi or wheezes     CV: regular rates and rhythm, normal S1 S2, no S3 or S4 and " no murmur, click or rub     ABDOMEN:  soft, nontender, no HSM or masses and bowel sounds normal     MS: extremities normal- no gross deformities noted, no evidence of inflammation in joints, FROM in all extremities.     SKIN: no suspicious lesions or rashes     NEURO: Normal strength and tone, sensory exam grossly normal, mentation intact and speech normal     PSYCH: mentation appears normal. and affect normal/bright     LYMPHATICS: No axillary, cervical, or supraclavicular nodes    DIAGNOSTICS:                                                    EKG: appears normal, NSR, normal axis, normal intervals, no acute ST/T changes c/w ischemia, no LVH by voltage criteria, unchanged from previous tracings  Lab Results   Component Value Date    WBC 8.8 11/08/2017     Lab Results   Component Value Date    RBC 4.47 11/08/2017     Lab Results   Component Value Date    HGB 13.8 11/08/2017     Lab Results   Component Value Date    HCT 42.1 11/08/2017     No components found for: MCT  Lab Results   Component Value Date    MCV 94.1 11/08/2017     Lab Results   Component Value Date    MCH 31.0 11/08/2017     Lab Results   Component Value Date    MCHC 32.9 11/08/2017     No results found for: RDW  Lab Results   Component Value Date     11/08/2017       Recent Labs   Lab Test  08/23/16   1004  07/21/16   1546  07/18/14   1004   06/05/12   1044   HGB  13.8  13.2*   --    < >   --    PLT  206  231   --    < >   --    NA   --    --   140   --   142   POTASSIUM   --    --   4.7   --   4.2   CR   --    --   0.72*   --   0.83    < > = values in this interval not displayed.        IMPRESSION:                                                    Reason for surgery/procedure: right shoulder rotator cuff syndrome  Diagnosis/reason for consult: 60 yo male with history of psoriatic arthritis, hyperlipidemia without know vascular disease. His BP is elevated, but describes lack of sleep and heavy traffic getting here. He is taking large amounts  of ibuprofen, which may contribute.    The proposed surgical procedure is considered LOW risk.    REVISED CARDIAC RISK INDEX  The patient has the following serious cardiovascular risks for perioperative complications such as (MI, PE, VFib and 3  AV Block):  No serious cardiac risks  INTERPRETATION: 1 risks: Class II (low risk - 0.9% complication rate)    The patient has the following additional risks for perioperative complications:  No identified additional risks      RECOMMENDATIONS:                                                      I suggested he stopped his NSAIDS, take acetaminophen or his oxycodone instead.    --Patient is to take all scheduled medications on the day of surgery EXCEPT for modifications listed below.    APPROVAL GIVEN to proceed with proposed procedure, without further diagnostic evaluation       Signed Electronically by: Johnnie Dejesus MD    Copy of this evaluation report is provided to requesting physician.

## 2017-12-04 ENCOUNTER — OFFICE VISIT (OUTPATIENT)
Dept: FAMILY MEDICINE | Facility: CLINIC | Age: 62
End: 2017-12-04

## 2017-12-04 VITALS
SYSTOLIC BLOOD PRESSURE: 144 MMHG | HEIGHT: 67 IN | RESPIRATION RATE: 16 BRPM | OXYGEN SATURATION: 99 % | TEMPERATURE: 98.4 F | DIASTOLIC BLOOD PRESSURE: 86 MMHG | WEIGHT: 191 LBS | BODY MASS INDEX: 29.98 KG/M2 | HEART RATE: 77 BPM

## 2017-12-04 DIAGNOSIS — N48.1 BALANITIS: Primary | ICD-10-CM

## 2017-12-04 DIAGNOSIS — I10 BENIGN ESSENTIAL HYPERTENSION: ICD-10-CM

## 2017-12-04 PROCEDURE — 99213 OFFICE O/P EST LOW 20 MIN: CPT | Performed by: FAMILY MEDICINE

## 2017-12-04 RX ORDER — NYSTATIN AND TRIAMCINOLONE ACETONIDE 100000; 1 [USP'U]/G; MG/G
CREAM TOPICAL 2 TIMES DAILY PRN
Qty: 15 G | Refills: 0 | COMMUNITY
Start: 2017-12-04 | End: 2019-11-07

## 2017-12-04 RX ORDER — AMLODIPINE BESYLATE 10 MG/1
10 TABLET ORAL DAILY
Qty: 90 TABLET | Refills: 3 | Status: SHIPPED | OUTPATIENT
Start: 2017-12-04 | End: 2018-07-12

## 2017-12-04 RX ORDER — NYSTATIN AND TRIAMCINOLONE ACETONIDE 100000; 1 [USP'U]/G; MG/G
CREAM TOPICAL 2 TIMES DAILY
Qty: 15 G | Refills: 0 | Status: SHIPPED | OUTPATIENT
Start: 2017-12-04 | End: 2017-12-04

## 2017-12-04 NOTE — PROGRESS NOTES
"Problem(s) Oriented visit      Subjective:  CC: Derm Problem (x1 month )    Rash on penis for a week or so. Asymptomatic. Had similar episode a few years ago, cleared with antifungal, but not this time.    ROS:  Recent right shoulder \"clean out\". No antibiotics.     HISTORY:   reports that he drinks about 5.0 oz of alcohol per week    reports that he quit smoking about 21 years ago. His smoking use included Cigarettes. He has a 30.00 pack-year smoking history. He has never used smokeless tobacco.    Past Medical History:   Diagnosis Date     ACP (advance care planning) 3-25-13     GERD (gastroesophageal reflux disease)      HTN, goal below 140/80      Hypercholesterolemia      Hyperlipidemia LDL goal <160 6/15/2011     Psoriatic arthritis (H)      Past Surgical History:   Procedure Laterality Date     ARTHROSCOPY KNEE RT/LT       CARPAL TUNNEL RELEASE RT/LT       FUSION CERVICAL ANTERIOR THREE+ LEVELS       FUSION LUMBAR ANTERIOR ONE LEVEL       ROTATOR CUFF REPAIR RT/LT         EXAM:  BP: 144/86   Pulse: 77    Temp: 98.4    Wt Readings from Last 2 Encounters:   12/04/17 86.6 kg (191 lb)   11/08/17 84.4 kg (186 lb)       BMI= Body mass index is 29.91 kg/(m^2).    EXAM:  NAD penis with serpiginous red rash underside of foreskin, and glans      Assessment/Plan:  Mane was seen today for derm problem.    Diagnoses and all orders for this visit:    Balanitis  -     Discontinue: nystatin-triamcinolone (MYCOLOG II) cream; Apply topically 2 times daily    Benign essential hypertension  -     amLODIPine (NORVASC) 10 MG tablet; Take 1 tablet (10 mg) by mouth daily    get BP cuff and report back in a month or two.   Use cream no more than 2 weeks, prn. Warned of atrophy. Referral if not better for poss psoriasis.        The following health maintenance items are reviewed in Epic and correct as of today:  Health Maintenance   Topic Date Due     TIFFANIE QUESTIONNAIRE 1 YEAR  11/22/2017     PHQ-9 Q1YR  11/22/2017     COLON CANCER " SCREEN (SYSTEM ASSIGNED)  12/12/2017     ADVANCE DIRECTIVE PLANNING Q5 YRS  03/25/2018     LIPID SCREEN Q5 YR MALE (SYSTEM ASSIGNED)  07/18/2019     TETANUS IMMUNIZATION (SYSTEM ASSIGNED)  08/03/2019     INFLUENZA VACCINE (SYSTEM ASSIGNED)  Addressed     HEPATITIS C SCREENING  Completed       Johnnie Dejesus  [facility]  [phone]       For any issues my office # is [phone]

## 2017-12-04 NOTE — MR AVS SNAPSHOT
"              After Visit Summary   2017    Mane Chawla    MRN: [id number]           Patient Information     Date Of Birth          1955        Visit Information        Provider Department      2017 4:15 PM Johnnie Dejesus MD [facility]        Today's Diagnoses     Balanitis    -  1    Benign essential hypertension           Follow-ups after your visit        Who to contact     If you have questions or need follow up information about today's clinic visit or your schedule please contact [facility] directly at [phone].  Normal or non-critical lab and imaging results will be communicated to you by LumiFoldhart, letter or phone within 4 business days after the clinic has received the results. If you do not hear from us within 7 days, please contact the clinic through [employer] or phone. If you have a critical or abnormal lab result, we will notify you by phone as soon as possible.  Submit refill requests through [employer] or call your pharmacy and they will forward the refill request to us. Please allow 3 business days for your refill to be completed.          Additional Information About Your Visit        MyChart Information     [employer] lets you send messages to your doctor, view your test results, renew your prescriptions, schedule appointments and more. To sign up, go to [email or web address] . Click on \"Log in\" on the left side of the screen, which will take you to the Welcome page. Then click on \"Sign up Now\" on the right side of the page.     You will be asked to enter the access code listed below, as well as some personal information. Please follow the directions to create your username and password.     Your access code is: [email or web address]  Expires: 3/4/2018  6:17 PM     Your access code will  in 90 days. If you need help or a new code, please call your [facility] or [phone].        Care EveryWhere ID     This is your Care EveryWhere ID. This could be " "used by other organizations to access your [facility] medical records  [id number]        Your Vitals Were     Pulse Temperature Respirations Height Pulse Oximetry BMI (Body Mass Index)    77 98.4  F (36.9  C) (Oral) 16 1.702 m (5' 7\") 99% 29.91 kg/m2       Blood Pressure from Last 3 Encounters:   12/04/17 144/86   11/08/17 (!) 156/96   09/05/17 140/72    Weight from Last 3 Encounters:   12/04/17 86.6 kg (191 lb)   11/08/17 84.4 kg (186 lb)   09/05/17 84.6 kg (186 lb 6.4 oz)              Today, you had the following     No orders found for display         Today's Medication Changes          These changes are accurate as of: 12/4/17  6:17 PM.  If you have any questions, ask your nurse or doctor.               Start taking these medicines.        Dose/Directions    amLODIPine 10 MG tablet   Commonly known as:  NORVASC   Used for:  Benign essential hypertension   Started by:  Johnnie Dejesus MD        Dose:  10 mg   Take 1 tablet (10 mg) by mouth daily   Quantity:  90 tablet   Refills:  3       nystatin-triamcinolone cream   Commonly known as:  MYCOLOG II   Used for:  Balanitis   Started by:  Johnnie Dejesus MD        Apply topically 2 times daily as needed   Quantity:  15 g   Refills:  0            Where to get your medicines      These medications were sent to [facility] [address] [place] [address] [address] [facility] [address] [facility] [address]     Phone:  [phone]     amLODIPine 10 MG tablet                Primary Care Provider Office Phone # Fax #    Johnnie Dejesus [phone] [address]  [facility] [address]        Equal Access to Services     [facility] SAFIA AH: Demarco multanio Sogirish, waaxda luqadaha, qaybta kaalmada adeegyada, kenzie marshall. So [facility] [phone].    ATENCIÓN: Si habla español, tiene a morales disposición servicios gratuitos de asistencia lingüística. Llame al " [phone].    We comply with applicable federal civil rights laws and [place] laws. We do not discriminate on the basis of race, color, national origin, age, disability, sex, sexual orientation, or gender identity.            Thank you!     Thank you for choosing [facility]  for your care. Our goal is always to provide you with excellent care. Hearing back from our patients is one way we can continue to improve our services. Please take a few minutes to complete the written survey that you may receive in the mail after your visit with us. Thank you!             Your Updated Medication List - Protect others around you: Learn how to safely use, store and throw away your medicines at www.disposemymeds.org.          This list is accurate as of: 12/4/17  6:17 PM.  Always use your most recent med list.                   Brand Name Dispense Instructions for use Diagnosis    ADVIL PO      Take 800 mg by mouth 2 times daily        amLODIPine 10 MG tablet    NORVASC    90 tablet    Take 1 tablet (10 mg) by mouth daily    Benign essential hypertension       CIMETIDINE ACID REDUCER PO      Take 1 tablet by mouth every morning.        diazepam 10 MG tablet    VALIUM    30 tablet    TAKE 1 TABLET BY MOUTH EVERY NIGHT AT BEDTIME AS NEEDED FOR SLEEP OR MUSCLE SPASMS    Encounter for medication refill       ENBREL SURECLICK 50 MG/ML autoinjector   Generic drug:  Etanercept      Inject 50 mg Subcutaneous once a week        fish oil-omega-3 fatty acids 1000 MG capsule      Take 2 g by mouth daily        Multi-vitamin Tabs tablet      Take 1 tablet by mouth daily        nystatin-triamcinolone cream    MYCOLOG II    15 g    Apply topically 2 times daily as needed    Balanitis       oxyCODONE-acetaminophen 5-325 MG per tablet    PERCOCET    90 tablet    Take 1 tablet by mouth every 4 hours as needed for moderate to severe pain    Chronic bilateral low back pain without sciatica       UNABLE TO FIND      MEDICATION NAME:  Tumeric

## 2018-03-13 DIAGNOSIS — Z79.899 ENCOUNTER FOR LONG-TERM (CURRENT) USE OF MEDICATIONS: Primary | ICD-10-CM

## 2018-03-13 RX ORDER — DIAZEPAM 10 MG
TABLET ORAL
Qty: 30 TABLET | Refills: 1 | Status: SHIPPED | OUTPATIENT
Start: 2018-03-13 | End: 2018-10-03

## 2018-03-13 NOTE — TELEPHONE ENCOUNTER
diazepam (VALIUM) 10 MG   LAST  Med check 12/4/17   last labs(for RX) 11/8/17  Next  appt scheduled =  none  Lissette Merrill MA

## 2018-04-03 ENCOUNTER — OFFICE VISIT (OUTPATIENT)
Dept: FAMILY MEDICINE | Facility: CLINIC | Age: 63
End: 2018-04-03

## 2018-04-03 VITALS
WEIGHT: 190 LBS | HEART RATE: 77 BPM | SYSTOLIC BLOOD PRESSURE: 160 MMHG | DIASTOLIC BLOOD PRESSURE: 92 MMHG | BODY MASS INDEX: 29.76 KG/M2 | RESPIRATION RATE: 16 BRPM | OXYGEN SATURATION: 97 %

## 2018-04-03 DIAGNOSIS — G89.29 CHRONIC BILATERAL LOW BACK PAIN WITHOUT SCIATICA: ICD-10-CM

## 2018-04-03 DIAGNOSIS — M54.50 CHRONIC BILATERAL LOW BACK PAIN WITHOUT SCIATICA: ICD-10-CM

## 2018-04-03 PROCEDURE — 99213 OFFICE O/P EST LOW 20 MIN: CPT | Performed by: FAMILY MEDICINE

## 2018-04-03 RX ORDER — OXYCODONE AND ACETAMINOPHEN 5; 325 MG/1; MG/1
1 TABLET ORAL EVERY 4 HOURS PRN
Qty: 90 TABLET | Refills: 0 | Status: SHIPPED | OUTPATIENT
Start: 2018-04-03 | End: 2018-07-12

## 2018-04-03 NOTE — MR AVS SNAPSHOT
"              After Visit Summary   4/3/2018    Mane Chawla    MRN: [id number]           Patient Information     Date Of Birth          1955        Visit Information        Provider Department      4/3/2018 3:15 PM Johnnie Dejesus MD [facility]        Today's Diagnoses     Chronic bilateral low back pain without sciatica           Follow-ups after your visit        Who to contact     If you have questions or need follow up information about today's clinic visit or your schedule please contact [facility] directly at [phone].  Normal or non-critical lab and imaging results will be communicated to you by Plan [employer] Acqusitionshart, letter or phone within 4 business days after the clinic has received the results. If you do not hear from us within 7 days, please contact the clinic through Veroldt or phone. If you have a critical or abnormal lab result, we will notify you by phone as soon as possible.  Submit refill requests through PoshVine or call your pharmacy and they will forward the refill request to us. Please allow 3 business days for your refill to be completed.          Additional Information About Your Visit        MyChart Information     PoshVine lets you send messages to your doctor, view your test results, renew your prescriptions, schedule appointments and more. To sign up, go to [email or web address] . Click on \"Log in\" on the left side of the screen, which will take you to the Welcome page. Then click on \"Sign up Now\" on the right side of the page.     You will be asked to enter the access code listed below, as well as some personal information. Please follow the directions to create your username and password.     Your access code is: [email or web address]  Expires: 2018  6:42 PM     Your access code will  in 90 days. If you need help or a new code, please call your [facility] or [phone].        Care EveryWhere ID     This is your Care EveryWhere ID. This could be used " by other organizations to access your [place] medical records  [id number]        Your Vitals Were     Pulse Respirations Pulse Oximetry BMI (Body Mass Index)          77 16 97% 29.76 kg/m2         Blood Pressure from Last 3 Encounters:   04/03/18 (!) 160/92   12/04/17 144/86   11/08/17 (!) 156/96    Weight from Last 3 Encounters:   04/03/18 86.2 kg (190 lb)   12/04/17 86.6 kg (191 lb)   11/08/17 84.4 kg (186 lb)              Today, you had the following     No orders found for display         Where to get your medicines      Some of these will need a paper prescription and others can be bought over the counter.  Ask your nurse if you have questions.     Bring a paper prescription for each of these medications     oxyCODONE-acetaminophen 5-325 MG per tablet          Primary Care Provider Office Phone # Fax #    Johnnie Dejesus [phone] [address] [facility] [address]        Equal Access to Services     ANNIA [facility] Hospital[facility]IL[facility] : Hadii aad ku hadasho Soomaali, waaxda luqadaha, qaybta kaalmada adeegyada, waxay idiin haysharmin sanchez khdelaney castillo . So [facility] [phone].    ATENCIÓN: Si habla español, tiene a morales disposición servicios gratuitos de asistencia lingüística. Llame al [phone].    We comply with applicable federal civil rights laws and [place] laws. We do not discriminate on the basis of race, color, national origin, age, disability, sex, sexual orientation, or gender identity.            Thank you!     Thank you for choosing [facility]  for your care. Our goal is always to provide you with excellent care. Hearing back from our patients is one way we can continue to improve our services. Please take a few minutes to complete the written survey that you may receive in the mail after your visit with us. Thank you!             Your Updated Medication List - Protect others around you: Learn how to safely use, store and throw away your medicines at www.disposemymeds.org.           This list is accurate as of 4/3/18  6:42 PM.  Always use your most recent med list.                   Brand Name Dispense Instructions for use Diagnosis    ADVIL PO      Take 800 mg by mouth 2 times daily        amLODIPine 10 MG tablet    NORVASC    90 tablet    Take 1 tablet (10 mg) by mouth daily    Benign essential hypertension       CIMETIDINE ACID REDUCER PO      Take 1 tablet by mouth every morning.        diazepam 10 MG tablet    VALIUM    30 tablet    TAKE ONE TABLET BY MOUTH EVERY NIGHT AT BEDTIME AS NEEDED FOR SLEEP OR MUSCLE SPASMS.    Encounter for long-term (current) use of medications       ENBREL SURECLICK 50 MG/ML autoinjector   Generic drug:  Etanercept      Inject 50 mg Subcutaneous once a week        fish oil-omega-3 fatty acids 1000 MG capsule      Take 2 g by mouth daily        Multi-vitamin Tabs tablet      Take 1 tablet by mouth daily        nystatin-triamcinolone cream    MYCOLOG II    15 g    Apply topically 2 times daily as needed    Balanitis       oxyCODONE-acetaminophen 5-325 MG per tablet    PERCOCET    90 tablet    Take 1 tablet by mouth every 4 hours as needed for moderate to severe pain    Chronic bilateral low back pain without sciatica       UNABLE TO FIND      MEDICATION NAME: Tumeric

## 2018-04-03 NOTE — PROGRESS NOTES
Mr. Chawla is here for refill of his oxycodone 5 mg, with #90 lasting about 6 months. He has gone to multiple MD's, and done PT, but his back pain has disabled him from his job as a . He had shoulder surgery 6 months ago, that is doing well. He has pain with almost all movement. He had a month off, but his pain has not subsided. He is interested in getting an opinion from [facility].  OBJECTIVE: BP (!) 160/92  Pulse 77  Resp 16  Wt 86.2 kg (190 lb)  SpO2 97%  BMI 29.76 kg/m2   He is intelligent, but looks fatigued.   (M54.5,  G89.29) Chronic bilateral low back pain without sciatica  Comment:   Plan: oxyCODONE-acetaminophen (PERCOCET) 5-325 MG per        tablet        I suggested he call [facility], he has been a patient there before.

## 2018-04-05 ENCOUNTER — TELEPHONE (OUTPATIENT)
Dept: FAMILY MEDICINE | Facility: CLINIC | Age: 63
End: 2018-04-05

## 2018-04-05 NOTE — TELEPHONE ENCOUNTER
Received fax from [employer] stating PA was approved for generic Percocet.  -Not sure who submitted PA.  Approval dates 04/03/2018-04/03/2019.  Called [employer] to inform of approval.

## 2018-05-14 ENCOUNTER — OFFICE VISIT (OUTPATIENT)
Dept: FAMILY MEDICINE | Facility: CLINIC | Age: 63
End: 2018-05-14

## 2018-05-14 VITALS
BODY MASS INDEX: 30.2 KG/M2 | WEIGHT: 192.8 LBS | SYSTOLIC BLOOD PRESSURE: 138 MMHG | HEART RATE: 78 BPM | DIASTOLIC BLOOD PRESSURE: 92 MMHG

## 2018-05-14 DIAGNOSIS — S16.1XXA STRAIN OF NECK MUSCLE, INITIAL ENCOUNTER: Primary | ICD-10-CM

## 2018-05-14 DIAGNOSIS — I10 BENIGN ESSENTIAL HYPERTENSION: ICD-10-CM

## 2018-05-14 DIAGNOSIS — G44.209 TENSION HEADACHE: ICD-10-CM

## 2018-05-14 PROCEDURE — 99214 OFFICE O/P EST MOD 30 MIN: CPT | Performed by: FAMILY MEDICINE

## 2018-05-14 RX ORDER — METAXALONE 800 MG/1
TABLET ORAL
Qty: 40 TABLET | Refills: 1 | Status: SHIPPED | OUTPATIENT
Start: 2018-05-14 | End: 2018-06-01 | Stop reason: SINTOL

## 2018-05-14 RX ORDER — HYDROCHLOROTHIAZIDE 12.5 MG/1
12.5 TABLET ORAL DAILY
Qty: 90 TABLET | Refills: 1 | Status: SHIPPED | OUTPATIENT
Start: 2018-05-14 | End: 2018-07-12

## 2018-05-14 NOTE — MR AVS SNAPSHOT
"              After Visit Summary   2018    Mane Chawla    MRN: [id number]           Patient Information     Date Of Birth          1955        Visit Information        Provider Department      2018 4:00 PM Cassie Lombardo MD [facility]        Today's Diagnoses     Strain of neck muscle, initial encounter    -  1    Benign essential hypertension          Care Instructions    Recheck blood pressure in 4 weeks.          Follow-ups after your visit        Who to contact     If you have questions or need follow up information about today's clinic visit or your schedule please contact [facility] directly at [phone].  Normal or non-critical lab and imaging results will be communicated to you by Icon Biosciencehart, letter or phone within 4 business days after the clinic has received the results. If you do not hear from us within 7 days, please contact the clinic through [employer] Biosciencehart or phone. If you have a critical or abnormal lab result, we will notify you by phone as soon as possible.  Submit refill requests through Buy buy tea or call your pharmacy and they will forward the refill request to us. Please allow 3 business days for your refill to be completed.          Additional Information About Your Visit        MyChart Information     Buy buy tea lets you send messages to your doctor, view your test results, renew your prescriptions, schedule appointments and more. To sign up, go to [email or web address] . Click on \"Log in\" on the left side of the screen, which will take you to the Welcome page. Then click on \"Sign up Now\" on the right side of the page.     You will be asked to enter the access code listed below, as well as some personal information. Please follow the directions to create your username and password.     Your access code is: [email or web address]  Expires: 2018  6:42 PM     Your access code will  in 90 days. If you need help or a new code, please call your [place] " clinic or [phone].        Care EveryWhere ID     This is your Care EveryWhere ID. This could be used by other organizations to access your [place] medical records  [id number]        Your Vitals Were     Pulse BMI (Body Mass Index)                78 30.2 kg/m2           Blood Pressure from Last 3 Encounters:   05/14/18 (!) 138/92   04/03/18 (!) 160/92   12/04/17 144/86    Weight from Last 3 Encounters:   05/14/18 87.5 kg (192 lb 12.8 oz)   04/03/18 86.2 kg (190 lb)   12/04/17 86.6 kg (191 lb)              Today, you had the following     No orders found for display         Today's Medication Changes          These changes are accurate as of 5/14/18  4:32 PM.  If you have any questions, ask your nurse or doctor.               Start taking these medicines.        Dose/Directions    hydrochlorothiazide 12.5 MG Tabs tablet   Used for:  Benign essential hypertension   Started by:  Cassie Lombardo MD        Dose:  12.5 mg   Take 1 tablet (12.5 mg) by mouth daily   Quantity:  90 tablet   Refills:  1       metaxalone 800 MG tablet   Commonly known as:  SKELAXIN   Used for:  Strain of neck muscle, initial encounter   Started by:  Cassie Lombardo MD        1/2 - 1 po every 6 hours prn muscle pain   Quantity:  40 tablet   Refills:  1            Where to get your medicines      These medications were sent to [facility] [address] [place] [address] [address] [place] [facility] [address], [facility] [address]     Phone:  [phone]     hydrochlorothiazide 12.5 MG Tabs tablet    metaxalone 800 MG tablet                Primary Care Provider Office Phone # Fax #    Cassie Lombardo [phone] [address] [place] [address]        Equal Access to Services     [facility] SAFIA AH: Demarco smiley Sogirish, waaxda luqadaha, qaybta kaalmada danielyayolande, kenzie marshall. So [facility] [phone].    ATENCIÓN: Si  alvino fairbanks, tiene a morales disposición servicios gratuitos de asistencia lingüística. Otilia stover [phone].    We comply with applicable federal civil rights laws and [place] laws. We do not discriminate on the basis of race, color, national origin, age, disability, sex, sexual orientation, or gender identity.            Thank you!     Thank you for choosing [facility]  for your care. Our goal is always to provide you with excellent care. Hearing back from our patients is one way we can continue to improve our services. Please take a few minutes to complete the written survey that you may receive in the mail after your visit with us. Thank you!             Your Updated Medication List - Protect others around you: Learn how to safely use, store and throw away your medicines at www.disposemymeds.org.          This list is accurate as of 5/14/18  4:32 PM.  Always use your most recent med list.                   Brand Name Dispense Instructions for use Diagnosis    ADVIL PO      Take 800 mg by mouth 2 times daily        amLODIPine 10 MG tablet    NORVASC    90 tablet    Take 1 tablet (10 mg) by mouth daily    Benign essential hypertension       CIMETIDINE ACID REDUCER PO      Take 1 tablet by mouth every morning.        diazepam 10 MG tablet    VALIUM    30 tablet    TAKE ONE TABLET BY MOUTH EVERY NIGHT AT BEDTIME AS NEEDED FOR SLEEP OR MUSCLE SPASMS.    Encounter for long-term (current) use of medications       ENBREL SURECLICK 50 MG/ML autoinjector   Generic drug:  Etanercept      Inject 50 mg Subcutaneous once a week        fish oil-omega-3 fatty acids 1000 MG capsule      Take 2 g by mouth daily        hydrochlorothiazide 12.5 MG Tabs tablet     90 tablet    Take 1 tablet (12.5 mg) by mouth daily    Benign essential hypertension       metaxalone 800 MG tablet    SKELAXIN    40 tablet    1/2 - 1 po every 6 hours prn muscle pain    Strain of neck muscle, initial encounter       Multi-vitamin Tabs tablet       Take 1 tablet by mouth daily        nystatin-triamcinolone cream    MYCOLOG II    15 g    Apply topically 2 times daily as needed    Balanitis       oxyCODONE-acetaminophen 5-325 MG per tablet    PERCOCET    90 tablet    Take 1 tablet by mouth every 4 hours as needed for moderate to severe pain    Chronic bilateral low back pain without sciatica       UNABLE TO FIND      MEDICATION NAME: Tumeric

## 2018-05-16 ENCOUNTER — TRANSFERRED RECORDS (OUTPATIENT)
Dept: FAMILY MEDICINE | Facility: CLINIC | Age: 63
End: 2018-05-16

## 2018-06-01 ENCOUNTER — OFFICE VISIT (OUTPATIENT)
Dept: FAMILY MEDICINE | Facility: CLINIC | Age: 63
End: 2018-06-01

## 2018-06-01 VITALS
HEIGHT: 68 IN | SYSTOLIC BLOOD PRESSURE: 158 MMHG | WEIGHT: 191.2 LBS | HEART RATE: 72 BPM | BODY MASS INDEX: 28.98 KG/M2 | RESPIRATION RATE: 12 BRPM | DIASTOLIC BLOOD PRESSURE: 84 MMHG

## 2018-06-01 DIAGNOSIS — I10 HTN (HYPERTENSION), BENIGN: Primary | ICD-10-CM

## 2018-06-01 PROCEDURE — 99213 OFFICE O/P EST LOW 20 MIN: CPT | Performed by: NURSE PRACTITIONER

## 2018-06-01 RX ORDER — LISINOPRIL 20 MG/1
20 TABLET ORAL DAILY
Qty: 30 TABLET | Refills: 1 | Status: SHIPPED | OUTPATIENT
Start: 2018-06-01 | End: 2018-07-12

## 2018-06-01 ASSESSMENT — ANXIETY QUESTIONNAIRES
6. BECOMING EASILY ANNOYED OR IRRITABLE: NOT AT ALL
GAD7 TOTAL SCORE: 1
2. NOT BEING ABLE TO STOP OR CONTROL WORRYING: SEVERAL DAYS
IF YOU CHECKED OFF ANY PROBLEMS ON THIS QUESTIONNAIRE, HOW DIFFICULT HAVE THESE PROBLEMS MADE IT FOR YOU TO DO YOUR WORK, TAKE CARE OF THINGS AT HOME, OR GET ALONG WITH OTHER PEOPLE: NOT DIFFICULT AT ALL
1. FEELING NERVOUS, ANXIOUS, OR ON EDGE: NOT AT ALL
7. FEELING AFRAID AS IF SOMETHING AWFUL MIGHT HAPPEN: NOT AT ALL
3. WORRYING TOO MUCH ABOUT DIFFERENT THINGS: NOT AT ALL
5. BEING SO RESTLESS THAT IT IS HARD TO SIT STILL: NOT AT ALL

## 2018-06-01 ASSESSMENT — PATIENT HEALTH QUESTIONNAIRE - PHQ9: 5. POOR APPETITE OR OVEREATING: NOT AT ALL

## 2018-06-01 NOTE — PROGRESS NOTES
Problem(s) Oriented visit        SUBJECTIVE:                                                    Mane Chawla is a 62 year old male who presents to clinic today for the following health issues : Amlodipine was not enough at top dose so HCTZ added. This caused fatigue so he stopped. 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     Hypertension Follow-up      Outpatient blood pressures are not being checked.    Low Salt Diet: not monitoring salt      Amount of exercise or physical activity: 4-5 days/week for an average of greater than 60 minutes, labor intensive job    Problems taking medications regularly: No    Medication side effects: fatigue from HCTZ    Diet: regular (no restrictions)        Problem list, Medication list, Allergies, and Medical/Social/Surgical histories reviewed in EPIC and updated as appropriate.   Additional history: as documented    ROS:  HEENT:  No changes in hearing or vision, no nose bleeds or other nasal problems, Head Positive for headache from neck issues.  Musculoskeletal:  Chropnic muscle and joint pains, back pain, neck pain from injuries and surgeries.     Histories:   Patient Active Problem List   Diagnosis     Psoriatic arthritis (H)     Hyperlipidemia LDL goal <160     ACP (advance care planning)     Health Care Home     Midline low back pain without sciatica     Benign essential hypertension     Encounter for long-term (current) use of medications     Past Surgical History:   Procedure Laterality Date     ARTHROSCOPY KNEE RT/LT       CARPAL TUNNEL RELEASE RT/LT       FUSION CERVICAL ANTERIOR THREE+ LEVELS       FUSION LUMBAR ANTERIOR ONE LEVEL       ROTATOR CUFF REPAIR RT/LT         Social History   Substance Use Topics     Smoking status: Former Smoker     Packs/day: 1.00     Years: 30.00     Types: Cigarettes     Quit date: 3/21/1996     Smokeless tobacco: Never Used     Alcohol use 5.0 oz/week     10 drink(s) per week     Family History   Problem Relation Age of Onset      "Respiratory Mother      CEREBROVASCULAR DISEASE Father      Alzheimer Disease Father            OBJECTIVE:                                                    /84  Pulse 72  Resp 12  Ht 1.721 m (5' 7.75\")  Wt 86.7 kg (191 lb 3.2 oz)  BMI 29.29 kg/m2  Body mass index is 29.29 kg/(m^2).   APPEARANCE: = Relaxed and in no distress, fatigues  Conj/Eyelids = noninjected and lids and lashes are without inflammation  PERRLA/Irises = Pupils Round Reactive to Light and Irisis without inflammation  Neck = No anterior or posterior adenopathy appreciated.  Thyroid = Not enlarged and no masses felt  Resp effort = Calm regular breathing  Breath Sounds = Good air movement with no rales or rhonchi in any lung fields  Heart Rate, Rythym, & sounds (no Murm)  = Regular rate and rythym with no S3, S4, or murmer appreciated.  Digits and Nails = FROM in all finger joints, no nail dystrophy  Ext (edema) = No pretibial edema noted or elsewhere  SKIN = absent significant rashes or lesions   Recent/Remote Memory = Alert and Oriented x 3  Mood/Affect = Cooperative and interested     ASSESSMENT/PLAN:                                                        Mane was seen today for hypertension, recheck medication and medication problem.    Diagnoses and all orders for this visit:    HTN (hypertension), benign    1. HTN (hypertension), benign  Stop amlodipine and HCTZ  - lisinopril (PRINIVIL/ZESTRIL) 20 MG tablet; Take 1 tablet (20 mg) by mouth daily  Dispense: 30 tablet; Refill: 1    FUTURE APPOINTMENTS:       - Follow-up visit lab only for BP check, results to me  Work on weight loss  Regular exercise    The following health maintenance items are reviewed in Epic and correct as of today:  Health Maintenance   Topic Date Due     HIV SCREEN (SYSTEM ASSIGNED)  11/20/1971     TIFFANIE QUESTIONNAIRE 1 YEAR  11/22/2017     PHQ-9 Q1YR  11/22/2017     COLON CANCER SCREEN (SYSTEM ASSIGNED)  12/12/2017     ADVANCE DIRECTIVE PLANNING Q5 YRS  " 03/25/2018     LIPID SCREEN Q5 YR MALE (SYSTEM ASSIGNED)  07/18/2019     TETANUS IMMUNIZATION (SYSTEM ASSIGNED)  08/03/2019     INFLUENZA VACCINE  Completed     HEPATITIS C SCREENING  Completed       ELIN Lopes CNP  [facility]  [phone]    For any issues my office # is [phone]

## 2018-06-01 NOTE — NURSING NOTE
Patient informed of need for cpx with fasting labs this year.  Patient given information on Colog[employer]- to check his insurance coverage.  And will discuss at his CPX, along with ADH which is also due per HM.  Patient agreed. To be scheduled  Lissette Merrill MA June 1, 2018 8:18 AM

## 2018-06-01 NOTE — LETTER
My Depression Action Plan  Name: Mane Chawla   Date of Birth 1955  Date: 6/1/2018    My doctor: Cassie Lombardo   My clinic: [facility] [address] [place] [address] [phone]          GREEN    ZONE   Good Control    What it looks like:     Things are going generally well. You have normal up s and down s. You may even feel depressed from time to time, but bad moods usually last less than a day.   What you need to do:  1. Continue to care for yourself (see self care plan)  2. Check your depression survival kit and update it as needed  3. Follow your physician s recommendations including any medication.  4. Do not stop taking medication unless you consult with your physician first.           YELLOW         ZONE Getting Worse    What it looks like:     Depression is starting to interfere with your life.     It may be hard to get out of bed; you may be starting to isolate yourself from others.    Symptoms of depression are starting to last most all day and this has happened for several days.     You may have suicidal thoughts but they are not constant.   What you need to do:     1. Call your care team, your response to treatment will improve if you keep your care team informed of your progress. Yellow periods are signs an adjustment may need to be made.     2. Continue your self-care, even if you have to fake it!    3. Talk to someone in your support network    4. Open up your depression survival kit           RED    ZONE Medical Alert - Get Help    What it looks like:     Depression is seriously interfering with your life.     You may experience these or other symptoms: You can t get out of bed most days, can t work or engage in other necessary activities, you have trouble taking care of basic hygiene, or basic responsibilities, thoughts of suicide or death that will not go away, self-injurious behavior.     What you need to do:  1. Call your care team and  request a same-day appointment. If they are not available (weekends or after hours) call your local crisis line, emergency room or 911.            Depression Self Care Plan / Survival Kit    Self-Care for Depression  Here s the deal. Your body and mind are really not as separate as most people think.  What you do and think affects how you feel and how you feel influences what you do and think. This means if you do things that people who feel good do, it will help you feel better.  Sometimes this is all it takes.  There is also a place for medication and therapy depending on how severe your depression is, so be sure to consult with your medical provider and/ or Behavioral Health Consultant if your symptoms are worsening or not improving.     In order to better manage my stress, I will:    Exercise  Get some form of exercise, every day. This will help reduce pain and release endorphins, the  feel good  chemicals in your brain. This is almost as good as taking antidepressants!  This is not the same as joining a gym and then never going! (they count on that by the way ) It can be as simple as just going for a walk or doing some gardening, anything that will get you moving.      Hygiene   Maintain good hygiene (Get out of bed in the morning, Make your bed, Brush your teeth, Take a shower, and Get dressed like you were going to work, even if you are unemployed).  If your clothes don't fit try to get ones that do.    Diet  I will strive to eat foods that are good for me, drink plenty of water, and avoid excessive sugar, caffeine, alcohol, and other mood-altering substances.  Some foods that are helpful in depression are: complex carbohydrates, B vitamins, flaxseed, fish or fish oil, fresh fruits and vegetables.    Psychotherapy  I agree to participate in Individual Therapy (if recommended).    Medication  If prescribed medications, I agree to take them.  Missing doses can result in serious side effects.  I understand that  drinking alcohol, or other illicit drug use, may cause potential side effects.  I will not stop my medication abruptly without first discussing it with my provider.    Staying Connected With Others  I will stay in touch with my friends, family members, and my primary care provider/team.    Use your imagination  Be creative.  We all have a creative side; it doesn t matter if it s oil painting, sand castles, or mud pies! This will also kick up the endorphins.    Witness Beauty  (AKA stop and smell the roses) Take a look outside, even in mid-winter. Notice colors, textures. Watch the squirrels and birds.     Service to others  Be of service to others.  There is always someone else in need.  By helping others we can  get out of ourselves  and remember the really important things.  This also provides opportunities for practicing all the other parts of the program.    Humor  Laugh and be silly!  Adjust your TV habits for less news and crime-drama and more comedy.    Control your stress  Try breathing deep, massage therapy, biofeedback, and meditation. Find time to relax each day.     My support system    Clinic Contact:  Phone number:    Contact 1:  Phone number:    Contact 2:  Phone number:    Congregational/:  Phone number:    Therapist:  Phone number:    Local crisis center:    Phone number:    Other community support:  Phone number:

## 2018-06-01 NOTE — MR AVS SNAPSHOT
"              After Visit Summary   2018    Mane Chawla    MRN: [id number]           Patient Information     Date Of Birth          1955        Visit Information        Provider Department      2018 7:45 AM Pau Gardner APRN CNP [facility]        Today's Diagnoses     HTN (hypertension), benign    -  1       Follow-ups after your visit        Follow-up notes from your care team     Return in about 2 weeks (around 6/15/2018), or lab visit, results to me, for BP Recheck.      Who to contact     If you have questions or need follow up information about today's clinic visit or your schedule please contact [facility] directly at [phone].  Normal or non-critical lab and imaging results will be communicated to you by [employer], letter or phone within 4 business days after the clinic has received the results. If you do not hear from us within 7 days, please contact the clinic through [employer] or phone. If you have a critical or abnormal lab result, we will notify you by phone as soon as possible.  Submit refill requests through [employer] or call your pharmacy and they will forward the refill request to us. Please allow 3 business days for your refill to be completed.          Additional Information About Your Visit        MyChart Information     [employer] lets you send messages to your doctor, view your test results, renew your prescriptions, schedule appointments and more. To sign up, go to [email or web address] . Click on \"Log in\" on the left side of the screen, which will take you to the Welcome page. Then click on \"Sign up Now\" on the right side of the page.     You will be asked to enter the access code listed below, as well as some personal information. Please follow the directions to create your username and password.     Your access code is: [email or web address]  Expires: 2018  6:42 PM     Your access code will  in 90 days. If you need help or a new code, please " "call your [facility] clinic or [phone].        Care EveryWhere ID     This is your Care EveryWhere ID. This could be used by other organizations to access your [facility] medical records  [id number]        Your Vitals Were     Pulse Respirations Height BMI (Body Mass Index)          72 12 1.721 m (5' 7.75\") 29.29 kg/m2         Blood Pressure from Last 3 Encounters:   06/01/18 158/84   05/14/18 (!) 138/92   04/03/18 (!) 160/92    Weight from Last 3 Encounters:   06/01/18 86.7 kg (191 lb 3.2 oz)   05/14/18 87.5 kg (192 lb 12.8 oz)   04/03/18 86.2 kg (190 lb)              We Performed the Following     DEPRESSION ACTION PLAN (DAP)          Today's Medication Changes          These changes are accurate as of 6/1/18  8:22 AM.  If you have any questions, ask your nurse or doctor.               Start taking these medicines.        Dose/Directions    lisinopril 20 MG tablet   Commonly known as:  PRINIVIL/ZESTRIL   Used for:  HTN (hypertension), benign   Started by:  Pau Gardner APRN CNP        Dose:  20 mg   Take 1 tablet (20 mg) by mouth daily   Quantity:  30 tablet   Refills:  1         Stop taking these medicines if you haven't already. Please contact your care team if you have questions.     metaxalone 800 MG tablet   Commonly known as:  SKELAXIN   Stopped by:  Pau Gardner APRN CNP                Where to get your medicines      Some of these will need a paper prescription and others can be bought over the counter.  Ask your nurse if you have questions.     Bring a paper prescription for each of these medications     lisinopril 20 MG tablet                Primary Care Provider Office Phone # Fax #    Cassie Lombardo [phone] [address] [place] [address]        Equal Access to Services     DANIEL BAIG AH: Demarco smiley Sogirish, waaxda luqadaha, qaybta kaalmada ademichaelyayolande, kenzie marshall. So wa " [phone].    ATENCIÓN: Si alvino fairbanks, tiene a morales disposición servicios gratuitos de asistencia lingüística. Otilia stover [phone].    We comply with applicable federal civil rights laws and [place] laws. We do not discriminate on the basis of race, color, national origin, age, disability, sex, sexual orientation, or gender identity.            Thank you!     Thank you for choosing [facility]  for your care. Our goal is always to provide you with excellent care. Hearing back from our patients is one way we can continue to improve our services. Please take a few minutes to complete the written survey that you may receive in the mail after your visit with us. Thank you!             Your Updated Medication List - Protect others around you: Learn how to safely use, store and throw away your medicines at www.disposemymeds.org.          This list is accurate as of 6/1/18  8:22 AM.  Always use your most recent med list.                   Brand Name Dispense Instructions for use Diagnosis    ADVIL PO      Take 800 mg by mouth 2 times daily        amLODIPine 10 MG tablet    NORVASC    90 tablet    Take 1 tablet (10 mg) by mouth daily    Benign essential hypertension       CIMETIDINE ACID REDUCER PO      Take 1 tablet by mouth every morning.        diazepam 10 MG tablet    VALIUM    30 tablet    TAKE ONE TABLET BY MOUTH EVERY NIGHT AT BEDTIME AS NEEDED FOR SLEEP OR MUSCLE SPASMS.    Encounter for long-term (current) use of medications       ENBREL SURECLICK 50 MG/ML autoinjector   Generic drug:  Etanercept      Inject 50 mg Subcutaneous once a week        fish oil-omega-3 fatty acids 1000 MG capsule      Take 2 g by mouth daily        hydrochlorothiazide 12.5 MG Tabs tablet     90 tablet    Take 1 tablet (12.5 mg) by mouth daily    Benign essential hypertension       lisinopril 20 MG tablet    PRINIVIL/ZESTRIL    30 tablet    Take 1 tablet (20 mg) by mouth daily    HTN (hypertension), benign        Multi-vitamin Tabs tablet      Take 1 tablet by mouth daily        nystatin-triamcinolone cream    MYCOLOG II    15 g    Apply topically 2 times daily as needed    Balanitis       oxyCODONE-acetaminophen 5-325 MG per tablet    PERCOCET    90 tablet    Take 1 tablet by mouth every 4 hours as needed for moderate to severe pain    Chronic bilateral low back pain without sciatica       UNABLE TO FIND      MEDICATION NAME: Tumeric

## 2018-06-02 ASSESSMENT — PATIENT HEALTH QUESTIONNAIRE - PHQ9: SUM OF ALL RESPONSES TO PHQ QUESTIONS 1-9: 2

## 2018-06-02 ASSESSMENT — ANXIETY QUESTIONNAIRES: GAD7 TOTAL SCORE: 1

## 2018-07-12 ENCOUNTER — OFFICE VISIT (OUTPATIENT)
Dept: FAMILY MEDICINE | Facility: CLINIC | Age: 63
End: 2018-07-12

## 2018-07-12 ENCOUNTER — TRANSFERRED RECORDS (OUTPATIENT)
Dept: FAMILY MEDICINE | Facility: CLINIC | Age: 63
End: 2018-07-12

## 2018-07-12 VITALS
WEIGHT: 193 LBS | DIASTOLIC BLOOD PRESSURE: 86 MMHG | HEART RATE: 76 BPM | BODY MASS INDEX: 29.56 KG/M2 | SYSTOLIC BLOOD PRESSURE: 142 MMHG

## 2018-07-12 DIAGNOSIS — I10 BENIGN ESSENTIAL HYPERTENSION: ICD-10-CM

## 2018-07-12 DIAGNOSIS — G89.29 CHRONIC BILATERAL LOW BACK PAIN WITHOUT SCIATICA: Primary | ICD-10-CM

## 2018-07-12 DIAGNOSIS — M54.50 CHRONIC BILATERAL LOW BACK PAIN WITHOUT SCIATICA: Primary | ICD-10-CM

## 2018-07-12 DIAGNOSIS — R79.89 ELEVATED LIVER FUNCTION TESTS: ICD-10-CM

## 2018-07-12 LAB — COLOGUARD-ABSTRACT: NEGATIVE

## 2018-07-12 PROCEDURE — 99214 OFFICE O/P EST MOD 30 MIN: CPT | Performed by: FAMILY MEDICINE

## 2018-07-12 RX ORDER — OXYCODONE AND ACETAMINOPHEN 5; 325 MG/1; MG/1
1 TABLET ORAL EVERY 4 HOURS PRN
Qty: 90 TABLET | Refills: 0 | Status: SHIPPED | OUTPATIENT
Start: 2018-07-12 | End: 2018-10-29

## 2018-07-12 RX ORDER — AMLODIPINE BESYLATE 10 MG/1
10 TABLET ORAL DAILY
Qty: 90 TABLET | Refills: 3 | Status: SHIPPED | OUTPATIENT
Start: 2018-07-12 | End: 2019-09-06

## 2018-07-12 NOTE — MR AVS SNAPSHOT
"              After Visit Summary   2018    Mane Chawla    MRN: [id number]           Patient Information     Date Of Birth          1955        Visit Information        Provider Department      2018 1:45 PM Cassie Lombardo MD [facility]        Today's Diagnoses     Chronic bilateral low back pain without sciatica    -  1    Benign essential hypertension        Elevated liver function tests           Follow-ups after your visit        Who to contact     If you have questions or need follow up information about today's clinic visit or your schedule please contact [facility] directly at [phone].  Normal or non-critical lab and imaging results will be communicated to you by [employer], letter or phone within 4 business days after the clinic has received the results. If you do not hear from us within 7 days, please contact the clinic through [employer] or phone. If you have a critical or abnormal lab result, we will notify you by phone as soon as possible.  Submit refill requests through [employer] or call your pharmacy and they will forward the refill request to us. Please allow 3 business days for your refill to be completed.          Additional Information About Your Visit        MyChart Information     [employer] lets you send messages to your doctor, view your test results, renew your prescriptions, schedule appointments and more. To sign up, go to [email or web address] . Click on \"Log in\" on the left side of the screen, which will take you to the Welcome page. Then click on \"Sign up Now\" on the right side of the page.     You will be asked to enter the access code listed below, as well as some personal information. Please follow the directions to create your username and password.     Your access code is: [email or web address]  Expires: 10/13/2018 10:25 AM     Your access code will  in 90 days. If you need help or a new code, please call your [facility] clinic or " [phone].        Care EveryWhere ID     This is your Care EveryWhere ID. This could be used by other organizations to access your [facility] medical records  [id number]        Your Vitals Were     Pulse BMI (Body Mass Index)                76 29.56 kg/m2           Blood Pressure from Last 3 Encounters:   07/12/18 142/86   06/01/18 158/84   05/14/18 (!) 138/92    Weight from Last 3 Encounters:   07/12/18 87.5 kg (193 lb)   06/01/18 86.7 kg (191 lb 3.2 oz)   05/14/18 87.5 kg (192 lb 12.8 oz)              We Performed the Following     Comp. Metabolic Panel (14) ([facility])          Today's Medication Changes          These changes are accurate as of 7/12/18 11:59 PM.  If you have any questions, ask your nurse or doctor.               Stop taking these medicines if you haven't already. Please contact your care team if you have questions.     hydrochlorothiazide 12.5 MG Tabs tablet   Stopped by:  Cassie Lombardo MD           lisinopril 20 MG tablet   Commonly known as:  PRINIVIL/ZESTRIL   Stopped by:  Cassie Lombardo MD                Where to get your medicines      These medications were sent to [email or web address] [address] [place] [address] [address] [facility] [address] [facility] [address]     Phone:  [phone]     amLODIPine 10 MG tablet         Some of these will need a paper prescription and others can be bought over the counter.  Ask your nurse if you have questions.     Bring a paper prescription for each of these medications     oxyCODONE-acetaminophen 5-325 MG per tablet               Information about OPIOIDS     PRESCRIPTION OPIOIDS: WHAT YOU NEED TO KNOW   We gave you an opioid (narcotic) pain medicine. It is important to manage your pain, but opioids are not always the best choice. You should first try all the other options your care team gave you. Take this medicine for as short a time (and as few doses) as possible.     These medicines have  risks:    DO NOT drive when on new or higher doses of pain medicine. These medicines can affect your alertness and reaction times, and you could be arrested for driving under the influence (DUI). If you need to use opioids long-term, talk to your care team about driving.    DO NOT operate heave machinery    DO NOT do any other dangerous activities while taking these medicines.     DO NOT drink any alcohol while taking these medicines.      If the opioid prescribed includes acetaminophen, DO NOT take with any other medicines that contain acetaminophen. Read all labels carefully. Look for the word  acetaminophen  or  Tylenol.  Ask your pharmacist if you have questions or are unsure.    You can get addicted to pain medicines, especially if you have a history of addiction (chemical, alcohol or substance dependence). Talk to your care team about ways to reduce this risk.    Store your pills in a secure place, locked if possible. We will not replace any lost or stolen medicine. If you don t finish your medicine, please throw away (dispose) as directed by your pharmacist. The [place] Pollution Control Agency has more information about safe disposal: https://www.pca.[facility].[place].us/living-green/managing-unwanted-medications.     All opioids tend to cause constipation. Drink plenty of water and eat foods that have a lot of fiber, such as fruits, vegetables, prune juice, apple juice and high-fiber cereal. Take a laxative (Miralax, milk of magnesia, Colace, Senna) if you don t move your bowels at least every other day.          Primary Care Provider Office Phone # Fax #    Cassie Annemarie Lombardo [phone] [address] [place] [address]        Equal Access to Services     [facility]: Hadii cristela smiley Sogirish, waaxda luqadaha, qaybta kaalkenzie benitez . [facility] [phone].    ATENCIÓN: Si habla español, tiene a morales disposición servicios  vickie de asistencia lingüística. Otilia stover [phone].    We comply with applicable federal civil rights laws and [place] laws. We do not discriminate on the basis of race, color, national origin, age, disability, sex, sexual orientation, or gender identity.            Thank you!     Thank you for choosing [facility]  for your care. Our goal is always to provide you with excellent care. Hearing back from our patients is one way we can continue to improve our services. Please take a few minutes to complete the written survey that you may receive in the mail after your visit with us. Thank you!             Your Updated Medication List - Protect others around you: Learn how to safely use, store and throw away your medicines at www.disposemymeds.org.          This list is accurate as of 7/12/18 11:59 PM.  Always use your most recent med list.                   Brand Name Dispense Instructions for use Diagnosis    ADVIL PO      Take 800 mg by mouth 2 times daily        amLODIPine 10 MG tablet    NORVASC    90 tablet    Take 1 tablet (10 mg) by mouth daily    Benign essential hypertension       CIMETIDINE ACID REDUCER PO      Take 1 tablet by mouth every morning.        diazepam 10 MG tablet    VALIUM    30 tablet    TAKE ONE TABLET BY MOUTH EVERY NIGHT AT BEDTIME AS NEEDED FOR SLEEP OR MUSCLE SPASMS.    Encounter for long-term (current) use of medications       ENBREL SURECLICK 50 MG/ML autoinjector   Generic drug:  Etanercept      Inject 50 mg Subcutaneous once a week        fish oil-omega-3 fatty acids 1000 MG capsule      Take 2 g by mouth daily        Multi-vitamin Tabs tablet      Take 1 tablet by mouth daily        nystatin-triamcinolone cream    MYCOLOG II    15 g    Apply topically 2 times daily as needed    Balanitis       oxyCODONE-acetaminophen 5-325 MG per tablet    PERCOCET    90 tablet    Take 1 tablet by mouth every 4 hours as needed for moderate to severe pain    Chronic bilateral low  back pain without sciatica       UNABLE TO FIND      MEDICATION NAME: Tumeric

## 2018-07-12 NOTE — PROGRESS NOTES
Problem(s) Oriented visit        SUBJECTIVE:                                                    Mane Chawla is a 62 year old male who presents to clinic today for chronic back pain. He has known disc disease and osteoarthritis and is post cervical and lumbar fusion. He needed to stop taking NSAID due to increased liver functions. He typically gets by taking 1/2 a percocet twice daily. This keeps things mildly at bay.    He is not on anything currently for HTN, having previously been on lisinopril and HCTZ but did not tolerate them due to fatigue. He thinks he did well on amlodipine in the past. No chest pain or pressure with exertion. He does not salt foods. He does not hydrate well.     Problem list, Medication list, Allergies, and Medical/Social/Surgical histories reviewed in EPIC and updated as appropriate.   Additional history: as documented    ROS:  5 point ROS completed and negative except noted above, including Gen, CV, Resp, GI, MS      Histories:   Patient Active Problem List   Diagnosis     Psoriatic arthritis (H)     Hyperlipidemia LDL goal <160     ACP (advance care planning)     Health Care Home     Midline low back pain without sciatica     Benign essential hypertension     Encounter for long-term (current) use of medications     Past Surgical History:   Procedure Laterality Date     ARTHROSCOPY KNEE RT/LT       CARPAL TUNNEL RELEASE RT/LT       FUSION CERVICAL ANTERIOR THREE+ LEVELS       FUSION LUMBAR ANTERIOR ONE LEVEL       ROTATOR CUFF REPAIR RT/LT         Social History   Substance Use Topics     Smoking status: Former Smoker     Packs/day: 1.00     Years: 30.00     Types: Cigarettes     Quit date: 3/21/1996     Smokeless tobacco: Never Used     Alcohol use 5.0 oz/week     10 drink(s) per week     Family History   Problem Relation Age of Onset     Respiratory Mother      Cerebrovascular Disease Father      Alzheimer Disease Father            OBJECTIVE:                                                     /86  Pulse 76  Wt 87.5 kg (193 lb)  BMI 29.56 kg/m2  Body mass index is 29.56 kg/(m^2).   GENERAL APPEARANCE: Alert, no acute distress  NECK: No adenopathy,masses or thyromegaly, no bruit  RESP: lungs clear to auscultation   CV: normal rate, regular rhythm, no murmur or gallop  MS: extremities normal, no peripheral edema  NEURO: Alert, oriented, speech and mentation normal  PSYCH: mentation appears normal, affect and mood normal   Labs Resulted Today:   Results for orders placed or performed in visit on 07/12/18   Comp. Metabolic Panel (14) ([facility])   Result Value Ref Range    Glucose 105 (H) 65 - 99 mg/dL    Urea Nitrogen 21 8 - 27 mg/dL    Creatinine 0.97 0.76 - 1.27 mg/dL    eGFR If NonAfricn Am 82 >59 mL/min/1.73    eGFR If Africn Am 95 >59 mL/min/1.73    BUN/Creatinine Ratio 22 10 - 24    Sodium 141 134 - 144 mmol/L    Potassium 4.0 3.5 - 5.2 mmol/L    Chloride 105 96 - 106 mmol/L    Total CO2 21 20 - 29 mmol/L    Calcium 9.5 8.6 - 10.2 mg/dL    Protein Total 7.1 6.0 - 8.5 g/dL    Albumin 4.6 3.6 - 4.8 g/dL    Globulin, Total 2.5 1.5 - 4.5 g/dL    A/G Ratio 1.8 1.2 - 2.2    Bilirubin Total 0.4 0.0 - 1.2 mg/dL    Alkaline Phosphatase 60 39 - 117 IU/L    AST 36 0 - 40 IU/L    ALT 43 0 - 44 IU/L    Narrative    Performed at:  01 - [facility] [address], [place], [place]  [id number]  : Hank Atwood MD, Phone:  [phone]     ASSESSMENT/PLAN:                                                        Mane was seen today for recheck medication and refill request.    Diagnoses and all orders for this visit:    Chronic bilateral low back pain without sciatica  -     oxyCODONE-acetaminophen (PERCOCET) 5-325 MG per tablet; Take 1 tablet by mouth every 4 hours as needed for moderate to severe pain  Renewal is given, med agreement is signed.    Benign essential hypertension  -     amLODIPine (NORVASC) 10 MG tablet; Take 1 tablet (10 mg) by mouth daily  -     Comp. Metabolic Panel (14)  ([facility])  Restart amlodipine, return in 1-2 months for recheck of blood pressure.  Recommend low salt diet and exercise as tolerated.    Elevated liver function tests  -     Comp. Metabolic Panel (14) ([facility])  Recheck today.      There are no Patient Instructions on file for this visit.    The following health maintenance items are reviewed in Epic and correct as of today:  Health Maintenance   Topic Date Due     HIV SCREEN (SYSTEM ASSIGNED)  11/20/1971     COLON CANCER SCREEN (SYSTEM ASSIGNED)  12/12/2017     ADVANCE DIRECTIVE PLANNING Q5 YRS  03/25/2018     INFLUENZA VACCINE (1) 09/01/2018     TIFFANIE QUESTIONNAIRE 1 YEAR  06/01/2019     PHQ-9 Q1YR  06/01/2019     LIPID SCREEN Q5 YR MALE (SYSTEM ASSIGNED)  07/18/2019     TETANUS IMMUNIZATION (SYSTEM ASSIGNED)  08/03/2019     HEPATITIS C SCREENING  Completed       Cassie Lombardo MD  [facility]  Family Practice  [facility]  [phone]    For any issues my office # is [phone]

## 2018-07-12 NOTE — LETTER
[facility]    07/12/18    Patient: Mane Chawla  YOB: 1955  Medical Record Number: [id number]                                                                  Controlled Substance Agreement  I understand that my care provider has prescribed controlled substances (narcotics, tranquilizers, and/or stimulants) to help manage my condition(s).  I am taking this medicine to help me function or work.  I know that this is strong medicine.  It could have serious side effects and even cause a dependency on the drug.  If I stop these medicines suddenly, I could have severe withdrawal symptoms.    The risks, benefits, and side effects of these medication(s) were explained to me.  I agree that:  1. I will take part in other treatments as advised by my provider.  This may be psychiatry or counseling, physical therapy, behavioral therapy, group treatment, or a referral to a pain clinic.  I will reduce or stop my medicine when my provider tells me to do so.   2. I will take my medicines as prescribed.  I will not change the dose or schedule unless my provider tells me to.  There will be no refills if I  run out early.   I may be contacted at any time without warning and asked to complete a drug test or pill count.   3. I will keep all my appointments at the clinic.  If I miss appointments or fail to follow instructions, my provider may stop my medicine.  4. I will not ask other providers to prescribe controlled substances. And I will not accept controlled substances from other people. If I need another prescribed controlled substance for a new reason, I will notify my provider within one business day.  5. If I enroll in the [place] Medical Marijuana program, I will tell my provider.  I will also sign an agreement to share my medical records with my provider.  6. I will use one pharmacy to fill all of my controlled substance prescriptions.  If my prescription is mailed to my pharmacy, it may take 5  to 7 days for my medicine to be ready.  7. I understand that my provider, clinic care team, and pharmacy can track controlled substance prescriptions from other providers through a central database (prescription monitoring program).  8. I will bring in my list of medications (or my medicine bottles) each time I come to the clinic.  [id number] REV-  07/2018                                                                                                                                   Page 1 of 2      [facility]    07/12/18    Patient: Mane Chawla  YOB: 1955  Medical Record Number: [id number]    9. Refills of controlled substances will be made only during office hours.  It is up to me to make sure that I do not run out of my medicines on weekends or holidays.    10. I am responsible for my prescriptions.  If the medicine/prescription is lost or stolen, it will not be replaced.   I also agree not to share these medicines with anyone.  11. I agree to not use ANY illegal or recreational drugs.  This includes marijuana, cocaine, bath salts or other drugs.  I agree not to use alcohol unless my provider says I may.  I agree to give urine samples whenever asked.  If I fail to give a urine sample, the provider may stop my medicine.     12. I will tell my nurse or provider right away if I become pregnant or have a new medical problem treated outside of [facility].  13. I understand that this medicine can affect my thinking and judgment.  It may be unsafe for me to drive, use machinery and do dangerous tasks.  I will not do any of these things until I know how the medicine affects me.  If my dose changes, I will wait to see how it affects me.  I will contact my provider if I have concerns about medicine side effects.  I understand that if I do not follow any of the conditions above, my prescriptions or treatment may be stopped.  Percocet 5-325 mg take one tablet at hs prn as needed for pain.  I  agree that my provider, clinic care team, and pharmacy may work with any city, state or federal law enforcement agency that investigates the misuse, sale, or other diversion of my controlled medicine. I will allow my provider to discuss my care with or share a copy of this agreement with any other treating provider, pharmacy or emergency room where I receive care.  I agree to give up (waive) any right of privacy or confidentiality with respect to these authorizations.   I have read this agreement and have asked questions about anything I did not understand.   ___________________________________    ___________________________  Patient Signature                                                           Date and Time  ___________________________________     ____________________________  Witness                                                                            Date and Time  ___________________________________  Cassie Lombardo MD  [id number] REV-  07/2018                                                                                                                                                   Page 2 of 2

## 2018-07-12 NOTE — LETTER
[facility]  [address] [place]  [address]  Phone: [phone]    July 16, 2018      Mane Chawla  [address]  [facility] [address]              Dear Mane,    Normal kidney and liver function.  Glucose is fine for a random sample.        Sincerely,     Cassie Lombardo M.D.    Results for orders placed or performed in visit on 07/12/18   Comp. Metabolic Panel (14) ([facility])   Result Value Ref Range    Glucose 105 (H) 65 - 99 mg/dL    Urea Nitrogen 21 8 - 27 mg/dL    Creatinine 0.97 0.76 - 1.27 mg/dL    eGFR If NonAfricn Am 82 >59 mL/min/1.73    eGFR If Africn Am 95 >59 mL/min/1.73    BUN/Creatinine Ratio 22 10 - 24    Sodium 141 134 - 144 mmol/L    Potassium 4.0 3.5 - 5.2 mmol/L    Chloride 105 96 - 106 mmol/L    Total CO2 21 20 - 29 mmol/L    Calcium 9.5 8.6 - 10.2 mg/dL    Protein Total 7.1 6.0 - 8.5 g/dL    Albumin 4.6 3.6 - 4.8 g/dL    Globulin, Total 2.5 1.5 - 4.5 g/dL    A/G Ratio 1.8 1.2 - 2.2    Bilirubin Total 0.4 0.0 - 1.2 mg/dL    Alkaline Phosphatase 60 39 - 117 IU/L    AST 36 0 - 40 IU/L    ALT 43 0 - 44 IU/L    Narrative    Performed at:  01 - [facility] [address], [place], [place]  [id number]  : Hank Atwood MD, Phone:  [phone]

## 2018-07-13 LAB
ALBUMIN SERPL-MCNC: 4.6 G/DL (ref 3.6–4.8)
ALBUMIN/GLOB SERPL: 1.8 {RATIO} (ref 1.2–2.2)
ALP SERPL-CCNC: 60 IU/L (ref 39–117)
ALT SERPL-CCNC: 43 IU/L (ref 0–44)
AST SERPL-CCNC: 36 IU/L (ref 0–40)
BILIRUB SERPL-MCNC: 0.4 MG/DL (ref 0–1.2)
BUN SERPL-MCNC: 21 MG/DL (ref 8–27)
BUN/CREATININE RATIO: 22 (ref 10–24)
CALCIUM SERPL-MCNC: 9.5 MG/DL (ref 8.6–10.2)
CHLORIDE SERPLBLD-SCNC: 105 MMOL/L (ref 96–106)
CREAT SERPL-MCNC: 0.97 MG/DL (ref 0.76–1.27)
EGFR IF AFRICN AM: 95 ML/MIN/1.73
EGFR IF NONAFRICN AM: 82 ML/MIN/1.73
GLOBULIN, TOTAL: 2.5 G/DL (ref 1.5–4.5)
GLUCOSE SERPL-MCNC: 105 MG/DL (ref 65–99)
POTASSIUM SERPL-SCNC: 4 MMOL/L (ref 3.5–5.2)
PROT SERPL-MCNC: 7.1 G/DL (ref 6–8.5)
SODIUM SERPL-SCNC: 141 MMOL/L (ref 134–144)
TOTAL CO2: 21 MMOL/L (ref 20–29)

## 2018-08-14 ENCOUNTER — TRANSFERRED RECORDS (OUTPATIENT)
Dept: FAMILY MEDICINE | Facility: CLINIC | Age: 63
End: 2018-08-14

## 2018-09-09 ENCOUNTER — HOSPITAL ENCOUNTER (EMERGENCY)
Facility: CLINIC | Age: 63
Discharge: HOME | End: 2018-09-09
Attending: PHYSICIAN ASSISTANT | Admitting: PHYSICIAN ASSISTANT
Payer: COMMERCIAL

## 2018-09-09 VITALS
HEART RATE: 92 BPM | OXYGEN SATURATION: 99 % | WEIGHT: 181 LBS | SYSTOLIC BLOOD PRESSURE: 159 MMHG | DIASTOLIC BLOOD PRESSURE: 68 MMHG | TEMPERATURE: 99.3 F | RESPIRATION RATE: 18 BRPM | BODY MASS INDEX: 27.72 KG/M2

## 2018-09-09 DIAGNOSIS — L03.115 CELLULITIS OF RIGHT LOWER EXTREMITY: ICD-10-CM

## 2018-09-09 PROCEDURE — 25000132 ZZH RX MED GY IP 250 OP 250 PS 637: Performed by: PHYSICIAN ASSISTANT

## 2018-09-09 PROCEDURE — 99283 EMERGENCY DEPT VISIT LOW MDM: CPT

## 2018-09-09 RX ORDER — CEPHALEXIN 500 MG/1
500 CAPSULE ORAL 4 TIMES DAILY
Qty: 28 CAPSULE | Refills: 0 | Status: SHIPPED | OUTPATIENT
Start: 2018-09-09 | End: 2018-09-16

## 2018-09-09 RX ORDER — CEPHALEXIN 500 MG/1
500 CAPSULE ORAL ONCE
Status: COMPLETED | OUTPATIENT
Start: 2018-09-09 | End: 2018-09-09

## 2018-09-09 RX ORDER — ACETAMINOPHEN 500 MG
1000 TABLET ORAL ONCE
Status: COMPLETED | OUTPATIENT
Start: 2018-09-09 | End: 2018-09-09

## 2018-09-09 RX ADMIN — ACETAMINOPHEN 1000 MG: 500 TABLET, FILM COATED ORAL at 15:54

## 2018-09-09 RX ADMIN — CEPHALEXIN 500 MG: 500 CAPSULE ORAL at 15:54

## 2018-09-09 ASSESSMENT — ENCOUNTER SYMPTOMS
HEADACHES: 1
FATIGUE: 1
WOUND: 1
FEVER: 1

## 2018-09-09 NOTE — ED PROVIDER NOTES
History     Chief Complaint:  Insect bites    HPI   Mnae Chawla is a 62 year old male who presents to the emergency department today for evaluation of insect bites and feeling feverish today. The patient notes two insect bites to his right shin he sustained while cleaning out a shed at their cabin. He did not see the bug bite him, but he felt it. He notes seeing spider webs in the shed and thinks a spider must have bitten him. Over the weekend the right shin became red and swollen around the bite sites. The bug bites are not itchy, but are painful. He also notes having a small cut just below the right knee, which was present prior to feeling the bug bites on that leg. Additionally, the patient has felt generally unwell of late with headache and feeling feverish. He has been taking ibuprofen and he last took that this morning.     Allergies:  Lisinopril  Cipro [Quinolones]  Codeine  Codeine Camsylate  Fluocinolone  Hmg-Coa-R Inhibitors      Medications:    amLODIPine (NORVASC) 10 MG tablet  CIMETIDINE ACID REDUCER PO  diazepam (VALIUM) 10 MG tablet  ENBREL SURECLICK 50 MG/ML autoinjector  fish oil-omega-3 fatty acids (FISH OIL) 1000 MG capsule  nystatin-triamcinolone (MYCOLOG II) cream    Past Medical History:    GERD  Hypertension   Hypercholesteremia   Hyperlipidemia   Psoriatic arthritis     Past Surgical History:    Knee arthroscopy   Carpal tunnel release   Cervical fusion - three levels   Lumbar fusion - one level  Rotator cuff repair     Family History:    Respiratory - mother  Cerebrovascular disease   Alzheimer disease     Social History:  The patient was accompanied to the ED by wife.  Smoking Status: former, 30 years  Smokeless Tobacco: never  Alcohol Use: yes    Marital Status:   [2]     Review of Systems   Constitutional: Positive for fatigue and fever.   Skin: Positive for rash (right shin) and wound (bug bites).   Neurological: Positive for headaches.   All other systems reviewed and are  negative.    Physical Exam     Patient Vitals for the past 24 hrs:   BP Temp Temp src Pulse Resp SpO2 Weight   09/09/18 1526 159/68 99.3  F (37.4  C) Oral 92 18 99 % 82.1 kg (181 lb)      Physical Exam  General: Alert, interactive. GCS 15  Head:  Scalp is atraumatic.  Eyes:  EOM intact. The pupils are equal, round, and reactive to light. No scleral icterus.  ENT:                                      Ears:  The external ears are normal.  Nose:  The external nose is normal.  Throat:  The oropharynx is normal. Mucus membranes are moist.                 Neck:  Normal range of motion. There is no rigidity.   CV:  Regular rate and rhythm. No murmur. 2+ DP pulses  Resp:  Breath sounds are clear bilaterally. Non-labored, no retractions or accessory muscle use.  MS:  Normal range of motion.  Full range of motion of right knee and ankle.  Skin:  Three erythematous circumferential 2cm areas to the right anterior lower leg with surrounding erythema and warmth to the mid calf as detailed below. Healing laceration just below the right knee.  No fluctuance or induration.  No streaking redness up the leg.  Neuro:  Strength and sensation grossly intact.   Psych:  Awake. Alert.  Appropriate interactions.                Emergency Department Course     Interventions:  1554 Keflex 500 mg PO  1554 Tylenol 1,000 mg PO      Emergency Department Course:  Nursing notes and vitals reviewed.  I entered the room.  I performed an exam of the patient as documented above.     The patient received the above intervention(s).     I discussed the treatment plan with the patient. They expressed understanding of this plan and consented to discharge. They will be discharged home with instructions for care and follow up. In addition, the patient will return to the emergency department if their symptoms worsen, if new symptoms arise or if there is any concern.  All questions were answered.    Impression & Plan      Medical Decision Making:  Mane Chawla  is a 62 year old male who presents for evaluation of redness to the right shin after probable bug bite, possibly from a spider. The history, physical exam and supporting data are consistent with cellulitis.  Low-grade temp of 99.3, otherwise vitals normal with no tachycardia. There does not appear at this time to be any complication of cellulitis including necrotizing fascitis, lymphangitis, lymphadenitis, abscess, osteomyelitis, sepsis, or shock. The patient is not immunosuppressed. Supportive outpatient management is indicated with antibiotics, keflex prescribed as detailed below. Suspect the infectious source is either from reported spider bites or a cut below his right knee that occurred a few days ago.  The area of erythema was outlined and strict return precautions were discussed with the patient and his wife including spreading redness outside of the outlined area, fevers greater than 100.4 at home, increasing pain, or any other new/concerning symptoms.  Otherwise follow-up with primary care provider in 1-2 days for recheck.  They voiced understanding and all questions/concerns addressed prior to discharge home.      Diagnosis:    ICD-10-CM    1. Cellulitis of right lower extremity L03.115      Disposition:   The patient was discharged to home.    Discharge Medications:  New Prescriptions    CEPHALEXIN (KEFLEX) 500 MG CAPSULE    Take 1 capsule (500 mg) by mouth 4 times daily for 7 days     Scribe Disclosure:  I, Elias Nichols, am serving as a scribe at 3:33 PM on 9/9/2018 to document services personally performed by Erin Rosas PA-C, based on my observations and the provider's statements to me.     EMERGENCY DEPARTMENT       Erni Rosas PA-C  09/09/18 [id number]

## 2018-09-09 NOTE — DISCHARGE INSTRUCTIONS
*Elevate your leg as much as possible.  *Take medications as prescribed.  Cephalexin as prescribed.  Culturelle to prevent diarrhea. Continue your current medications  *Follow-up with your doctor for a recheck in the next 24-48 hours.  *Return if you develop fever, rapid spread or pain/redness/warmth, worsening pain, faint or feel like you will faint or become worse in any way.      Discharge Instructions  Cellulitis    Cellulitis is an infection of the skin that occurs when bacteria enter the skin.   Symptoms are generally redness, swelling, warmth and pain.  Your infection appeared to be appropriate to treat at home with antibiotics.  However, sometimes your infection may be worse than it seemed at first, or may worsen with time. If you have new or worse symptoms, you may need to be seen again in the Emergency Department or by your primary doctor.    Return to the Emergency Department if:    The redness, pain, or swelling gets a lot worse.  If the red area was marked, return if it is red beyond the marked area.    You are unable to get your antibiotics, or are vomiting them up or you can t take them.    You are feeling more ill, weak or lightheaded.    You start to run a new fever (temperature >101).    Anything else about the infection worries or concerns you.  Treatment:    Start your antibiotics right away, and take them as prescribed. Be sure to finish the whole prescription, even if you are better.    Apply a heating pad, warm packs, or warm water soaks to the infected area for 15 minutes at a time, at least 3 times a day. Do not use a heating pad on your feet or legs if you have diabetes. Do not sleep with a heating pad on, since this can cause burns or skin injury.    Rest your injured area for at least 1-2 days. After that you may start using your extremity again as long as there is not too much pain.     Raise the injured area above the level of your heart as much as possible in the first 1-2  "days.    Tylenol  (acetaminophen), Motrin  (ibuprofen), or Advil  (ibuprofen) may help may help reduce pain and fever and may help you feel more comfortable. Be sure to read and follow the package directions, and ask your doctor if you have questions.    Follow-up with your doctor:    Re-check in clinic within 2-3 days.  Probiotics: If you have been given an antibiotic, you may want to also take a probiotic pill or eat yogurt with live cultures. Probiotics have \"good bacteria\" to help your intestines stay healthy. Studies have shown that probiotics help prevent diarrhea and other intestine problems (including C. diff infection) when you take antibiotics. You can buy these without a prescription in the pharmacy section of the store.     If you were given a prescription for medicine here today, be sure to read all of the information (including the package insert) that comes with your prescription.  This will include important information about the medicine, its side effects, and any warnings that you need to know about.  The pharmacist who fills the prescription can provide more information and answer questions you may have about the medicine.  If you have questions or concerns that the pharmacist cannot address, please call or return to the Emergency Department.         "

## 2018-09-09 NOTE — ED AVS SNAPSHOT
Emergency Department    [address] [place] [address]    Phone:  [phone]    Fax:  [phone]                                       Mane Chawla   MRN: [id number]    Department:   Emergency Department   Date of Visit:  9/9/2018           After Visit Summary Signature Page     I have received my discharge instructions, and my questions have been answered. I have discussed any challenges I see with this plan with the nurse or doctor.    ..........................................................................................................................................  Patient/Patient Representative Signature      ..........................................................................................................................................  Patient Representative Print Name and Relationship to Patient    ..................................................               ................................................  Date                                            Time    ..........................................................................................................................................  Reviewed by Signature/Title    ...................................................              ..............................................  Date                                                            Time          22EPIC Rev 08/18

## 2018-09-09 NOTE — ED AVS SNAPSHOT
Emergency Department    [address] [facility] [address]    Phone:  [phone]    Fax:  [phone]                                       Mane Chawla   MRN: [id number]    Department:   Emergency Department   Date of Visit:  9/9/2018           Patient Information     Date Of Birth          1955        Your diagnoses for this visit were:     Cellulitis of right lower extremity        You were seen by Erin Rosas PA-C.      Follow-up Information     Follow up with Cassie Lombardo MD In 2 days.    Specialty:  Family Practice    Why:  for wound recheck    Contact information:    [address] [place] [address] [phone]          Follow up with  Emergency Department.    Specialty:  EMERGENCY MEDICINE    Why:  As needed, If symptoms worsen    Contact information:    [address] [facility] [id number] [phone]        Discharge Instructions       *Elevate your leg as much as possible.  *Take medications as prescribed.  Cephalexin as prescribed.  Culturelle to prevent diarrhea. Continue your current medications  *Follow-up with your doctor for a recheck in the next 24-48 hours.  *Return if you develop fever, rapid spread or pain/redness/warmth, worsening pain, faint or feel like you will faint or become worse in any way.      Discharge Instructions  Cellulitis    Cellulitis is an infection of the skin that occurs when bacteria enter the skin.   Symptoms are generally redness, swelling, warmth and pain.  Your infection appeared to be appropriate to treat at home with antibiotics.  However, sometimes your infection may be worse than it seemed at first, or may worsen with time. If you have new or worse symptoms, you may need to be seen again in the Emergency Department or by your primary doctor.    Return to the Emergency Department if:    The redness, pain, or swelling gets a lot worse.  If the red area was marked, return if it is red  "beyond the marked area.    You are unable to get your antibiotics, or are vomiting them up or you can t take them.    You are feeling more ill, weak or lightheaded.    You start to run a new fever (temperature >101).    Anything else about the infection worries or concerns you.  Treatment:    Start your antibiotics right away, and take them as prescribed. Be sure to finish the whole prescription, even if you are better.    Apply a heating pad, warm packs, or warm water soaks to the infected area for 15 minutes at a time, at least 3 times a day. Do not use a heating pad on your feet or legs if you have diabetes. Do not sleep with a heating pad on, since this can cause burns or skin injury.    Rest your injured area for at least 1-2 days. After that you may start using your extremity again as long as there is not too much pain.     Raise the injured area above the level of your heart as much as possible in the first 1-2 days.    Tylenol  (acetaminophen), Motrin  (ibuprofen), or Advil  (ibuprofen) may help may help reduce pain and fever and may help you feel more comfortable. Be sure to read and follow the package directions, and ask your doctor if you have questions.    Follow-up with your doctor:    Re-check in clinic within 2-3 days.  Probiotics: If you have been given an antibiotic, you may want to also take a probiotic pill or eat yogurt with live cultures. Probiotics have \"good bacteria\" to help your intestines stay healthy. Studies have shown that probiotics help prevent diarrhea and other intestine problems (including C. diff infection) when you take antibiotics. You can buy these without a prescription in the pharmacy section of the store.     If you were given a prescription for medicine here today, be sure to read all of the information (including the package insert) that comes with your prescription.  This will include important information about the medicine, its side effects, and any warnings that you " need to know about.  The pharmacist who fills the prescription can provide more information and answer questions you may have about the medicine.  If you have questions or concerns that the pharmacist cannot address, please call or return to the Emergency Department.           24 Hour Appointment Hotline       To make an appointment at any [facility], call [phone] ([phone]). If you don't have a family doctor or clinic, we will help you find one. [facility] clinics are conveniently located to serve the needs of you and your family.             Review of your medicines      START taking        Dose / Directions Last dose taken    cephALEXin 500 MG capsule   Commonly known as:  KEFLEX   Dose:  500 mg   Quantity:  28 capsule        Take 1 capsule (500 mg) by mouth 4 times daily for 7 days   Refills:  0          Our records show that you are taking the medicines listed below. If these are incorrect, please call your family doctor or clinic.        Dose / Directions Last dose taken    ADVIL PO   Dose:  800 mg        Take 800 mg by mouth 2 times daily   Refills:  0        amLODIPine 10 MG tablet   Commonly known as:  NORVASC   Dose:  10 mg   Quantity:  90 tablet        Take 1 tablet (10 mg) by mouth daily   Refills:  3        CIMETIDINE ACID REDUCER PO   Dose:  1 tablet        Take 1 tablet by mouth every morning.   Refills:  0        diazepam 10 MG tablet   Commonly known as:  VALIUM   Quantity:  30 tablet        TAKE ONE TABLET BY MOUTH EVERY NIGHT AT BEDTIME AS NEEDED FOR SLEEP OR MUSCLE SPASMS.   Refills:  1        ENBREL SURECLICK 50 MG/ML autoinjector   Dose:  50 mg   Generic drug:  Etanercept        Inject 50 mg Subcutaneous once a week   Refills:  0        fish oil-omega-3 fatty acids 1000 MG capsule   Dose:  2 g        Take 2 g by mouth daily   Refills:  0        Multi-vitamin Tabs tablet   Dose:  1 tablet        Take 1 tablet by mouth daily   Refills:  0        nystatin-triamcinolone cream    Commonly known as:  MYCOLOG II   Quantity:  15 g        Apply topically 2 times daily as needed   Refills:  0        oxyCODONE-acetaminophen 5-325 MG per tablet   Commonly known as:  PERCOCET   Dose:  1 tablet   Quantity:  90 tablet        Take 1 tablet by mouth every 4 hours as needed for moderate to severe pain   Refills:  0        UNABLE TO FIND        MEDICATION NAME: Tumeric   Refills:  0                Prescriptions were sent or printed at these locations (1 Prescription)                   Other Prescriptions                Printed at Department/Unit printer (1 of 1)         cephALEXin (KEFLEX) 500 MG capsule                Orders Needing Specimen Collection     None      Pending Results     No orders found from 9/7/2018 to 9/10/2018.            Pending Culture Results     No orders found from 9/7/2018 to 9/10/2018.            Pending Results Instructions     If you had any lab results that were not finalized at the time of your Discharge, you can call the ED Lab Result RN at [phone]. You will be contacted by this team for any positive Lab results or changes in treatment. The nurses are available 7 days a week from 10A to 6:30P.  You can leave a message 24 hours per day and they will return your call.        Test Results From Your Hospital Stay               Clinical Quality Measure: Blood Pressure Screening     Your blood pressure was checked while you were in the emergency department today. The last reading we obtained was  BP: 159/68 . Please read the guidelines below about what these numbers mean and what you should do about them.  If your systolic blood pressure (the top number) is less than 120 and your diastolic blood pressure (the bottom number) is less than 80, then your blood pressure is normal. There is nothing more that you need to do about it.  If your systolic blood pressure (the top number) is 120-139 or your diastolic blood pressure (the bottom number) is 80-89, your blood pressure may  "be higher than it should be. You should have your blood pressure rechecked within a year by a primary care provider.  If your systolic blood pressure (the top number) is 140 or greater or your diastolic blood pressure (the bottom number) is 90 or greater, you may have high blood pressure. High blood pressure is treatable, but if left untreated over time it can put you at risk for heart attack, stroke, or kidney failure. You should have your blood pressure rechecked by a primary care provider within the next 4 weeks.  If your provider in the emergency department today gave you specific instructions to follow-up with your doctor or provider even sooner than that, you should follow that instruction and not wait for up to 4 weeks for your follow-up visit.        Thank you for choosing [facility]       Thank you for choosing [facility] for your care. Our goal is always to provide you with excellent care. Hearing back from our patients is one way we can continue to improve our services. Please take a few minutes to complete the written survey that you may receive in the mail after you visit with us. Thank you!        [employer] Information     [employer] lets you send messages to your doctor, view your test results, renew your prescriptions, schedule appointments and more. To sign up, go to [email or web address] . Click on \"Log in\" on the left side of the screen, which will take you to the Welcome page. Then click on \"Sign up Now\" on the right side of the page.     You will be asked to enter the access code listed below, as well as some personal information. Please follow the directions to create your username and password.     Your access code is: [email or web address]  Expires: 10/13/2018 10:25 AM     Your access code will  in 90 days. If you need help or a new code, please call your [facility] clinic or [phone].        Care EveryWhere ID     This is your Care EveryWhere ID. This could be used by other organizations to access " your [facility] medical records  [id number]        Equal Access to Services     DANIEL BAIG : Demarco Coker, yousif gleason, kenzie goncalves. So [facility] [phone].    ATENCIÓN: Si habla español, tiene a morales disposición servicios gratuitos de asistencia lingüística. Llame al [phone].    We comply with applicable federal civil rights laws and [place] laws. We do not discriminate on the basis of race, color, national origin, age, disability, sex, sexual orientation, or gender identity.            After Visit Summary       This is your record. Keep this with you and show to your community pharmacist(s) and doctor(s) at your next visit.

## 2018-09-11 ENCOUNTER — OFFICE VISIT (OUTPATIENT)
Dept: FAMILY MEDICINE | Facility: CLINIC | Age: 63
End: 2018-09-11

## 2018-09-11 VITALS
BODY MASS INDEX: 28.03 KG/M2 | RESPIRATION RATE: 16 BRPM | SYSTOLIC BLOOD PRESSURE: 160 MMHG | WEIGHT: 183 LBS | DIASTOLIC BLOOD PRESSURE: 72 MMHG | OXYGEN SATURATION: 97 % | HEART RATE: 74 BPM

## 2018-09-11 DIAGNOSIS — I10 BENIGN ESSENTIAL HYPERTENSION: ICD-10-CM

## 2018-09-11 DIAGNOSIS — L40.50 PSORIATIC ARTHRITIS (H): ICD-10-CM

## 2018-09-11 DIAGNOSIS — L03.115 CELLULITIS OF RIGHT LOWER EXTREMITY: Primary | ICD-10-CM

## 2018-09-11 PROCEDURE — 99213 OFFICE O/P EST LOW 20 MIN: CPT | Performed by: FAMILY MEDICINE

## 2018-09-11 RX ORDER — OMEPRAZOLE 10 MG/1
20 CAPSULE, DELAYED RELEASE ORAL EVERY MORNING
COMMUNITY
Start: 2008-01-15 | End: 2024-05-03

## 2018-09-11 RX ORDER — CLINDAMYCIN HCL 150 MG
150 CAPSULE ORAL 3 TIMES DAILY
Qty: 30 CAPSULE | Refills: 0 | Status: SHIPPED | OUTPATIENT
Start: 2018-09-11 | End: 2018-10-09

## 2018-09-11 NOTE — MR AVS SNAPSHOT
"              After Visit Summary   2018    Mane Chawla    MRN: [id number]           Patient Information     Date Of Birth          1955        Visit Information        Provider Department      2018 11:30 AM Gudelia Montes MD [facility]        Today's Diagnoses     Cellulitis of right lower extremity    -  1      Care Instructions    Add Clindamycin antibiotic for cellulitis.   F/u if worsening or not resolved in one week.          Follow-ups after your visit        Who to contact     If you have questions or need follow up information about today's clinic visit or your schedule please contact [facility] directly at [phone].  Normal or non-critical lab and imaging results will be communicated to you by [employer], letter or phone within 4 business days after the clinic has received the results. If you do not hear from us within 7 days, please contact the clinic through [employer] or phone. If you have a critical or abnormal lab result, we will notify you by phone as soon as possible.  Submit refill requests through [employer] or call your pharmacy and they will forward the refill request to us. Please allow 3 business days for your refill to be completed.          Additional Information About Your Visit        MyChart Information     [employer] lets you send messages to your doctor, view your test results, renew your prescriptions, schedule appointments and more. To sign up, go to [email or web address] . Click on \"Log in\" on the left side of the screen, which will take you to the Welcome page. Then click on \"Sign up Now\" on the right side of the page.     You will be asked to enter the access code listed below, as well as some personal information. Please follow the directions to create your username and password.     Your access code is: [email or web address]  Expires: 10/13/2018 10:25 AM     Your access code will  in 90 days. If you need help or a new code, please call " your [facility] clinic or [phone].        Care EveryWhere ID     This is your Care EveryWhere ID. This could be used by other organizations to access your [facility] medical records  [id number]        Your Vitals Were     Pulse Respirations Pulse Oximetry BMI (Body Mass Index)          74 16 97% 28.03 kg/m2         Blood Pressure from Last 3 Encounters:   09/11/18 160/72   09/09/18 159/68   07/12/18 142/86    Weight from Last 3 Encounters:   09/11/18 83 kg (183 lb)   09/09/18 82.1 kg (181 lb)   07/12/18 87.5 kg (193 lb)              Today, you had the following     No orders found for display         Today's Medication Changes          These changes are accurate as of 9/11/18 12:03 PM.  If you have any questions, ask your nurse or doctor.               Start taking these medicines.        Dose/Directions    clindamycin 150 MG capsule   Commonly known as:  CLEOCIN   Used for:  Cellulitis of right lower extremity   Started by:  Gudelia Montes MD        Dose:  150 mg   Take 1 capsule (150 mg) by mouth 3 times daily   Quantity:  30 capsule   Refills:  0            Where to get your medicines      These medications were sent to [facility] [address] [place] [address] [address] AT [facility] [address], [facility] [address]     Phone:  [phone]     clindamycin 150 MG capsule                Primary Care Provider Office Phone # Fax #    Cassie Annemarie Lombardo [phone] [address] [place] [address]        Equal Access to Services     [facility] Hospital[facility] AH: Hadii cristela hills hadasho Soomaali, waaxda luqadaha, qaybta kaalmada dread, kenzie marshall. So [facility] [phone].    ATENCIÓN: Si habla español, tiene a morales disposición servicios gratuitos de asistencia lingüística. Llame al [phone].    We comply with applicable federal civil rights laws and [place] laws. We do not discriminate on the basis of race, color,  national origin, age, disability, sex, sexual orientation, or gender identity.            Thank you!     Thank you for choosing [facility]  for your care. Our goal is always to provide you with excellent care. Hearing back from our patients is one way we can continue to improve our services. Please take a few minutes to complete the written survey that you may receive in the mail after your visit with us. Thank you!             Your Updated Medication List - Protect others around you: Learn how to safely use, store and throw away your medicines at www.disposemymeds.org.          This list is accurate as of 9/11/18 12:03 PM.  Always use your most recent med list.                   Brand Name Dispense Instructions for use Diagnosis    ADVIL PO      Take 800 mg by mouth 2 times daily        amLODIPine 10 MG tablet    NORVASC    90 tablet    Take 1 tablet (10 mg) by mouth daily    Benign essential hypertension       cephALEXin 500 MG capsule    KEFLEX    28 capsule    Take 1 capsule (500 mg) by mouth 4 times daily for 7 days        CIMETIDINE ACID REDUCER PO      Take 1 tablet by mouth every morning.        clindamycin 150 MG capsule    CLEOCIN    30 capsule    Take 1 capsule (150 mg) by mouth 3 times daily    Cellulitis of right lower extremity       diazepam 10 MG tablet    VALIUM    30 tablet    TAKE ONE TABLET BY MOUTH EVERY NIGHT AT BEDTIME AS NEEDED FOR SLEEP OR MUSCLE SPASMS.    Encounter for long-term (current) use of medications       ENBREL SURECLICK 50 MG/ML autoinjector   Generic drug:  Etanercept      Inject 50 mg Subcutaneous once a week        fish oil-omega-3 fatty acids 1000 MG capsule      Take 2 g by mouth daily        Multi-vitamin Tabs tablet      Take 1 tablet by mouth daily        nystatin-triamcinolone cream    MYCOLOG II    15 g    Apply topically 2 times daily as needed    Balanitis       oxyCODONE-acetaminophen 5-325 MG per tablet    PERCOCET    90 tablet    Take 1 tablet by mouth  every 4 hours as needed for moderate to severe pain    Chronic bilateral low back pain without sciatica       priLOSEC 10 MG CR capsule   Generic drug:  omeprazole           UNABLE TO FIND      MEDICATION NAME: Tumeric

## 2018-09-11 NOTE — PROGRESS NOTES
"  SUBJECTIVE:   Mane Chawla is a 62 year old male who presents to clinic today for the following health issues:    F/u ER visit for cellulitis. These notes were reviewed.    Concern - \"Spider bite\" (Cellulitis) No insect or tick was ever observed by the patient FYI. He thinks it is more red than before. The area is demarcated with sharpie marker by there ER and remains within the lines. No calf pain. No fever today. He is on Keflex. I added Clindamycin for MRSA coverage.(considered Septra DS, Doxycycline etc)  Onset: 9/8/2019    Description:   Red spot on lower right leg,painful    Intensity: severe    Progression of Symptoms:  worsening    Accompanying Signs & Symptoms:  no    Previous history of similar problem:   no    Precipitating factors:   Worsened by: no    Alleviating factors:  Improved by: no    Therapies Tried and outcome: cephalexin 500mg since 9/9/2018, advil        Problem list and histories reviewed & adjusted, as indicated.  Additional history: as documented    Patient Active Problem List   Diagnosis     Psoriatic arthritis (H)     Hyperlipidemia LDL goal <160     ACP (advance care planning)     Health Care Home     Midline low back pain without sciatica     Benign essential hypertension     Encounter for long-term (current) use of medications     Past Surgical History:   Procedure Laterality Date     ARTHROSCOPY KNEE RT/LT       CARPAL TUNNEL RELEASE RT/LT       FUSION CERVICAL ANTERIOR THREE+ LEVELS       FUSION LUMBAR ANTERIOR ONE LEVEL       ROTATOR CUFF REPAIR RT/LT         Social History   Substance Use Topics     Smoking status: Former Smoker     Packs/day: 1.00     Years: 30.00     Types: Cigarettes     Quit date: 3/21/1996     Smokeless tobacco: Never Used     Alcohol use 5.0 oz/week     10 drink(s) per week     Family History   Problem Relation Age of Onset     Respiratory Mother      Cerebrovascular Disease Father      Alzheimer Disease Father          Current Outpatient Prescriptions " "  Medication Sig Dispense Refill     amLODIPine (NORVASC) 10 MG tablet Take 1 tablet (10 mg) by mouth daily 90 tablet 3     cephALEXin (KEFLEX) 500 MG capsule Take 1 capsule (500 mg) by mouth 4 times daily for 7 days 28 capsule 0     CIMETIDINE ACID REDUCER PO Take 1 tablet by mouth every morning.       clindamycin (CLEOCIN) 150 MG capsule Take 1 capsule (150 mg) by mouth 3 times daily 30 capsule 0     diazepam (VALIUM) 10 MG tablet TAKE ONE TABLET BY MOUTH EVERY NIGHT AT BEDTIME AS NEEDED FOR SLEEP OR MUSCLE SPASMS. 30 tablet 1     ENBREL SURECLICK 50 MG/ML autoinjector Inject 50 mg Subcutaneous once a week   0     fish oil-omega-3 fatty acids (FISH OIL) 1000 MG capsule Take 2 g by mouth daily       Ibuprofen (ADVIL PO) Take 800 mg by mouth 2 times daily        multivitamin, therapeutic with minerals (MULTI-VITAMIN) TABS tablet Take 1 tablet by mouth daily       nystatin-triamcinolone (MYCOLOG II) cream Apply topically 2 times daily as needed 15 g 0     omeprazole (PRILOSEC) 10 MG CR capsule        oxyCODONE-acetaminophen (PERCOCET) 5-325 MG per tablet Take 1 tablet by mouth every 4 hours as needed for moderate to severe pain 90 tablet 0     UNABLE TO FIND MEDICATION NAME: Tumeric       Allergies   Allergen Reactions     Lisinopril Fatigue     Cipro [Quinolones] Other (See Comments)     Achy joints     Codeine Nausea     per h&p     Codeine Camsylate Nausea     Fluocinolone Other (See Comments)     \"achy joints\"     Hmg-Coa-R Inhibitors Other (See Comments)     As provastatin- fatigue  On rovustatin  As of 10/1/14     Recent Labs   Lab Test  07/12/18   1432  07/18/14   1004  06/05/12   1044   06/15/11   1145   LDL   --   174*  127*   --   106*   HDL   --   44  46   --   48   TRIG   --   220*  175*   --   238*   ALT  43   --   22   --   24   CR  0.97  0.72*  0.83   < >   --    POTASSIUM  4.0  4.7  4.2   < >   --     < > = values in this interval not displayed.      BP Readings from Last 3 Encounters:   09/11/18 " 160/72   09/09/18 159/68   07/12/18 142/86    Wt Readings from Last 3 Encounters:   09/11/18 83 kg (183 lb)   09/09/18 82.1 kg (181 lb)   07/12/18 87.5 kg (193 lb)                  Labs reviewed in EPIC    Reviewed and updated as needed this visit by clinical staff       Reviewed and updated as needed this visit by Provider         ROS:  Constitutional, HEENT, cardiovascular, pulmonary, GI, , musculoskeletal, neuro, skin, endocrine and psych systems are negative, except as otherwise noted.    OBJECTIVE:     /72  Pulse 74  Resp 16  Wt 83 kg (183 lb)  SpO2 97%  BMI 28.03 kg/m2  Body mass index is 28.03 kg/(m^2).  GENERAL: healthy, alert and no distress  NECK: no adenopathy, no asymmetry, masses, or scars and thyroid normal to palpation  RESP: lungs clear to auscultation - no rales, rhonchi or wheezes  CV: regular rate and rhythm, normal S1 S2, no S3 or S4, no murmur, click or rub, no peripheral edema and peripheral pulses strong  ABDOMEN: soft, nontender, no hepatosplenomegaly, no masses and bowel sounds normal  MS: no gross musculoskeletal defects noted, no edema  SKIN: no suspicious lesions or rashes other than RLE known cellulitis with outline in Sharpie done by ER. No ulceration or eschar.   NEURO: Normal strength and tone, mentation intact and speech normal  PSYCH: mentation appears normal, affect normal/bright  LYMPH: no cervical, supraclavicular, axillary, or inguinal adenopathy    Diagnostic Test Results:  Results for orders placed or performed in visit on 07/12/18   Comp. Metabolic Panel (14) ([facility])   Result Value Ref Range    Glucose 105 (H) 65 - 99 mg/dL    Urea Nitrogen 21 8 - 27 mg/dL    Creatinine 0.97 0.76 - 1.27 mg/dL    eGFR If NonAfricn Am 82 >59 mL/min/1.73    eGFR If Africn Am 95 >59 mL/min/1.73    BUN/Creatinine Ratio 22 10 - 24    Sodium 141 134 - 144 mmol/L    Potassium 4.0 3.5 - 5.2 mmol/L    Chloride 105 96 - 106 mmol/L    Total CO2 21 20 - 29 mmol/L    Calcium 9.5 8.6 - 10.2  mg/dL    Protein Total 7.1 6.0 - 8.5 g/dL    Albumin 4.6 3.6 - 4.8 g/dL    Globulin, Total 2.5 1.5 - 4.5 g/dL    A/G Ratio 1.8 1.2 - 2.2    Bilirubin Total 0.4 0.0 - 1.2 mg/dL    Alkaline Phosphatase 60 39 - 117 IU/L    AST 36 0 - 40 IU/L    ALT 43 0 - 44 IU/L    Narrative    Performed at:  01 - [facility] [address], [place], [place]  [id number]  : Hank Atwood MD, Phone:  [phone]       ASSESSMENT/PLAN:     ASSESSMENT / PLAN:  (L03.115) Cellulitis of right lower extremity  (primary encounter diagnosis)  Comment: adding this to Keflex. Discusses   Plan: clindamycin (CLEOCIN) 150 MG capsule            (L40.50) Psoriatic arthritis (H)  Comment:   Plan: per dermatology    HTN elevation today.It was unclear to me if he had taken his BP medication today.   BP Readings from Last 3 Encounters:   09/11/18 160/72   09/09/18 159/68   07/12/18 142/86     No CP or HA.    Patient Instructions   Add Clindamycin antibiotic for cellulitis.   F/u if worsening or not resolved in one week.      uGdelia Montes MD  [facility]

## 2018-10-03 DIAGNOSIS — Z79.899 ENCOUNTER FOR LONG-TERM (CURRENT) USE OF MEDICATIONS: ICD-10-CM

## 2018-10-03 RX ORDER — DIAZEPAM 10 MG
TABLET ORAL
Qty: 30 TABLET | Refills: 0 | Status: SHIPPED | OUTPATIENT
Start: 2018-10-03 | End: 2019-04-05

## 2018-10-03 NOTE — TELEPHONE ENCOUNTER
diazepam (VALIUM) 10 MG   LAST  Med check 9/11/18   last labs(for RX) 7/12/18  Next  appt scheduled =  none  Lissette Merrill MA

## 2018-10-09 ENCOUNTER — OFFICE VISIT (OUTPATIENT)
Dept: FAMILY MEDICINE | Facility: CLINIC | Age: 63
End: 2018-10-09

## 2018-10-09 VITALS
DIASTOLIC BLOOD PRESSURE: 84 MMHG | TEMPERATURE: 98.1 F | HEART RATE: 88 BPM | SYSTOLIC BLOOD PRESSURE: 138 MMHG | WEIGHT: 181 LBS | BODY MASS INDEX: 27.72 KG/M2

## 2018-10-09 DIAGNOSIS — Z23 NEEDS FLU SHOT: Primary | ICD-10-CM

## 2018-10-09 DIAGNOSIS — Z87.891 STOPPED SMOKING WITH GREATER THAN 20 PACK YEAR HISTORY: ICD-10-CM

## 2018-10-09 DIAGNOSIS — R01.1 NEWLY RECOGNIZED MURMUR: ICD-10-CM

## 2018-10-09 DIAGNOSIS — R61 NIGHT SWEATS: ICD-10-CM

## 2018-10-09 DIAGNOSIS — R63.4 LOSS OF WEIGHT: ICD-10-CM

## 2018-10-09 DIAGNOSIS — J02.9 SORE THROAT: ICD-10-CM

## 2018-10-09 LAB
% GRANULOCYTES: 70.6 % (ref 42.2–75.2)
HCT VFR BLD AUTO: 43.5 % (ref 39–51)
HEMOGLOBIN: 14.6 G/DL (ref 13.4–17.5)
LYMPHOCYTES NFR BLD AUTO: 22.6 % (ref 20.5–51.1)
MCH RBC QN AUTO: 31.4 PG (ref 27–31)
MCHC RBC AUTO-ENTMCNC: 33.6 G/DL (ref 33–37)
MCV RBC AUTO: 93.5 FL (ref 80–100)
MONOCYTES NFR BLD AUTO: 6.8 % (ref 1.7–9.3)
MONONUCLEOSIS SCREEN: NORMAL
PLATELET # BLD AUTO: 279 K/UL (ref 140–450)
RBC # BLD AUTO: 4.65 X10/CMM (ref 4.2–5.9)
WBC # BLD AUTO: 11.1 X10/CMM (ref 3.8–11)

## 2018-10-09 PROCEDURE — 71046 X-RAY EXAM CHEST 2 VIEWS: CPT | Performed by: FAMILY MEDICINE

## 2018-10-09 PROCEDURE — 90686 IIV4 VACC NO PRSV 0.5 ML IM: CPT | Performed by: FAMILY MEDICINE

## 2018-10-09 PROCEDURE — 86403 PARTICLE AGGLUT ANTBDY SCRN: CPT | Performed by: FAMILY MEDICINE

## 2018-10-09 PROCEDURE — 90471 IMMUNIZATION ADMIN: CPT | Performed by: FAMILY MEDICINE

## 2018-10-09 PROCEDURE — 99214 OFFICE O/P EST MOD 30 MIN: CPT | Mod: 25 | Performed by: FAMILY MEDICINE

## 2018-10-09 PROCEDURE — 85025 COMPLETE CBC W/AUTO DIFF WBC: CPT | Performed by: FAMILY MEDICINE

## 2018-10-09 PROCEDURE — 36415 COLL VENOUS BLD VENIPUNCTURE: CPT | Performed by: FAMILY MEDICINE

## 2018-10-09 NOTE — PROGRESS NOTES
Problem(s) Oriented visit        SUBJECTIVE:                                                    Mane Chawla is a 62 year old male who presents to clinic today for feeling poorly since spider bite which caused cellulitis. He feels febrile at night, skin is sensitive, headache, and poor energy. His temperature each night is about 99.9F. He occasionally has drenching sweats. He is down about 15 pounds in the last month. He occasionally gets up at night to urinate. He denies any increased thirst. He denies any congestion, cough, abdominal pain, change in bowel habits, vomiting, dysuria. He has a sore throat that waxes and wanes. He was a smoker, quit 25 years ago, with about 20 PYHx.       Problem list, Medication list, Allergies, and Medical/Social/Surgical histories reviewed in EPIC and updated as appropriate.   Additional history: as documented    ROS:  10 point ROS completed and negative except noted above, including Gen, HEENT, CV, Resp, GI, , MS, Neurologic, Psych  He notes the toes (all 5 bilaterally) and balls of his feel are numb and feel like he is walking on lumps. This is not a new symptom.    Histories:   Patient Active Problem List   Diagnosis     Psoriatic arthritis (H)     Hyperlipidemia LDL goal <160     ACP (advance care planning)     Health Care Home     Midline low back pain without sciatica     Benign essential hypertension     Encounter for long-term (current) use of medications     Past Surgical History:   Procedure Laterality Date     ARTHROSCOPY KNEE RT/LT       CARPAL TUNNEL RELEASE RT/LT       FUSION CERVICAL ANTERIOR THREE+ LEVELS       FUSION LUMBAR ANTERIOR ONE LEVEL       ROTATOR CUFF REPAIR RT/LT         Social History   Substance Use Topics     Smoking status: Former Smoker     Packs/day: 1.00     Years: 30.00     Types: Cigarettes     Quit date: 3/21/1996     Smokeless tobacco: Never Used     Alcohol use 5.0 oz/week     10 drink(s) per week     Family History   Problem Relation  Age of Onset     Respiratory Mother      Cerebrovascular Disease Father      Alzheimer Disease Father            OBJECTIVE:                                                    /84  Pulse 88  Temp 98.1  F (36.7  C) (Oral)  Wt 82.1 kg (181 lb)  BMI 27.72 kg/m2  Body mass index is 27.72 kg/(m^2).   GENERAL APPEARANCE: Alert, no acute distress  EYES: PERRL, EOM normal, conjunctiva and lids normal  HENT: Ears and TMs normal, oral mucosa and posterior oropharynx normal  NECK: No adenopathy,masses or thyromegaly  RESP: lungs clear to auscultation   CV: regular rhythm, rate-normal, grade 2/6 systolic murmur heard best over the left upper sternal border  ABDOMEN: soft, no organomegaly, masses or tenderness  LYMPHATICS: No cervical, supraclavicular, axillary, or inguinal adenopathy  MS: extremities normal, no peripheral edema  NEURO: Alert, oriented, speech and mentation normal  PSYCH: mentation appears normal, affect and mood normal  CXR shows old granulomatous changes, otherwise unremarkable.   Labs Resulted Today:   Results for orders placed or performed in visit on 10/09/18   CBC with Diff/Plt ([facility])   Result Value Ref Range    WBC x10/cmm 11.1 (A) 3.8 - 11.0 x10/cmm    % Lymphocytes 22.6 20.5 - 51.1 %    % Monocytes 6.8 1.7 - 9.3 %    % Granulocytes 70.6 42.2 - 75.2 %    RBC x10/cmm 4.65 4.2 - 5.9 x10/cmm    Hemoglobin 14.6 13.4 - 17.5 g/dl    Hematocrit 43.5 39 - 51 %    MCV 93.5 80 - 100 fL    MCH 31.4 (A) 27.0 - 31.0 pg    MCHC 33.6 33.0 - 37.0 g/dL    Platelet Count 279 140 - 450 K/uL   Mono ([facility])   Result Value Ref Range    Mononucleosis Screen Neg Neg     ASSESSMENT/PLAN:                                                        Mane was seen today for fever and lab only.    Diagnoses and all orders for this visit:    Etiology of his symptom cluster is unknown, but need to consider infectious and cancerous etiology due to the night sweats and weight loss. Also need to rule out thyroid disease. New murmur  makes SBE a consideration. Start with TTE, but if negative and no other source identified, will pursue ESTHER. Follow up pending the tests ordered. He will try to pay attention to any other symptoms that will help guide us toward a more focused investigation.    Needs flu shot  -     HC FLU VAC PRESRV FREE QUAD SPLIT VIR 3+YRS IM  -     ADMIN 1st VACCINE    Loss of weight  -     CBC with Diff/Plt ([facility])  -     Comp. Metabolic Panel (14) ([facility])  -     TSH ([facility])  -     X-ray Chest 2 vws*    Night sweats  -     CBC with Diff/Plt ([facility])  -     Comp. Metabolic Panel (14) ([facility])  -     TSH ([facility])  -     X-ray Chest 2 vws*  -     Mono ([facility])  -     BLOOD CULTURE, ROUTINE ([facility])    Stopped smoking with greater than 20 pack year history  -     X-ray Chest 2 vws*    Sore throat  -     Mono ([facility])  -     Beta Strep Grp A Cult ([facility])    Newly recognized murmur  -     BLOOD CULTURE, ROUTINE ([facility])  -     Echocardiogram Complete; Future        There are no Patient Instructions on file for this visit.    The following health maintenance items are reviewed in Epic and correct as of today:  Health Maintenance   Topic Date Due     HIV SCREEN (SYSTEM ASSIGNED)  11/20/1971     COLON CANCER SCREEN (SYSTEM ASSIGNED)  12/12/2017     ADVANCE DIRECTIVE PLANNING Q5 YRS  03/25/2018     INFLUENZA VACCINE (1) 09/01/2018     TIFFANIE QUESTIONNAIRE 1 YEAR  06/01/2019     PHQ-9 Q1YR  06/01/2019     LIPID SCREEN Q5 YR MALE (SYSTEM ASSIGNED)  07/18/2019     TETANUS IMMUNIZATION (SYSTEM ASSIGNED)  08/03/2019     HEPATITIS C SCREENING  Completed       Cassie Lombardo MD  [facility]  Family Practice  [facility]  [phone]    For any issues my office # is [phone]

## 2018-10-09 NOTE — MR AVS SNAPSHOT
"              After Visit Summary   10/9/2018    Mane Chawla    MRN: [id number]           Patient Information     Date Of Birth          1955        Visit Information        Provider Department      10/9/2018 8:15 AM Cassie Lombardo MD [facility]        Today's Diagnoses     Needs flu shot    -  1    Loss of weight        Night sweats        Stopped smoking with greater than 20 pack year history        Sore throat        Newly recognized murmur           Follow-ups after your visit        Future tests that were ordered for you today     Open Future Orders        Priority Expected Expires Ordered    Echocardiogram Complete Routine  10/9/2019 10/9/2018            Who to contact     If you have questions or need follow up information about today's clinic visit or your schedule please contact [facility] directly at [phone].  Normal or non-critical lab and imaging results will be communicated to you by NanoCellecthart, letter or phone within 4 business days after the clinic has received the results. If you do not hear from us within 7 days, please contact the clinic through NanoCellecthart or phone. If you have a critical or abnormal lab result, we will notify you by phone as soon as possible.  Submit refill requests through YeahMobi or call your pharmacy and they will forward the refill request to us. Please allow 3 business days for your refill to be completed.          Additional Information About Your Visit        NanoCellecthart Information     YeahMobi lets you send messages to your doctor, view your test results, renew your prescriptions, schedule appointments and more. To sign up, go to [email or web address] . Click on \"Log in\" on the left side of the screen, which will take you to the Welcome page. Then click on \"Sign up Now\" on the right side of the page.     You will be asked to enter the access code listed below, as well as some personal information. Please follow the directions to " create your username and password.     Your access code is: [email or web address]  Expires: 10/13/2018 10:25 AM     Your access code will  in 90 days. If you need help or a new code, please call your [place] clinic or [phone].        Care EveryWhere ID     This is your Care EveryWhere ID. This could be used by other organizations to access your [place] medical records  [id number]        Your Vitals Were     Pulse Temperature BMI (Body Mass Index)             88 98.1  F (36.7  C) (Oral) 27.72 kg/m2          Blood Pressure from Last 3 Encounters:   10/09/18 138/84   18 160/72   18 159/68    Weight from Last 3 Encounters:   10/09/18 82.1 kg (181 lb)   18 83 kg (183 lb)   18 82.1 kg (181 lb)              We Performed the Following     ADMIN 1st VACCINE     Beta Strep Grp A Cult ([facility])     BLOOD CULTURE, ROUTINE ([facility])     CBC with Diff/Plt ([facility])     Comp. Metabolic Panel (14) ([facility])     HC FLU VAC PRESRV FREE QUAD SPLIT VIR 3+YRS IM     [place] ([facility])     TSH ([facility])     X-ray Chest 2 vws*        Primary Care Provider Office Phone # Fax #    Cassie Annemarie Lombardo [phone] [address] [place] [address]        Equal Access to Services     ANNIA BAIG AH: Hadii aad ku hadasho Soomaali, waaxda luqadaha, qaybta kaalmada wilderda, kenzie marshall. So [facility] [phone].    ATENCIÓN: Si habla español, tiene a morales disposición servicios gratuitos de asistencia lingüística. Llame al [phone].    We comply with applicable federal civil rights laws and [place] laws. We do not discriminate on the basis of race, color, national origin, age, disability, sex, sexual orientation, or gender identity.            Thank you!     Thank you for choosing [facility]  for your care. Our goal is always to provide you with excellent care. Hearing back from our patients is one way we can continue to improve our services. Please  take a few minutes to complete the written survey that you may receive in the mail after your visit with us. Thank you!             Your Updated Medication List - Protect others around you: Learn how to safely use, store and throw away your medicines at www.disposemymeds.org.          This list is accurate as of 10/9/18 12:58 PM.  Always use your most recent med list.                   Brand Name Dispense Instructions for use Diagnosis    ADVIL PO      Take 800 mg by mouth 2 times daily        amLODIPine 10 MG tablet    NORVASC    90 tablet    Take 1 tablet (10 mg) by mouth daily    Benign essential hypertension       CIMETIDINE ACID REDUCER PO      Take 1 tablet by mouth every morning.        diazepam 10 MG tablet    VALIUM    30 tablet    TAKE 1 TABLET BY MOUTH EVERY NIGHT AT BEDTIME AS NEEDED FOR SLEEP OR MUSCLE SPASMS    Encounter for long-term (current) use of medications       ENBREL SURECLICK 50 MG/ML autoinjector   Generic drug:  Etanercept      Inject 50 mg Subcutaneous once a week        fish oil-omega-3 fatty acids 1000 MG capsule      Take 2 g by mouth daily        Multi-vitamin Tabs tablet      Take 1 tablet by mouth daily        nystatin-triamcinolone cream    MYCOLOG II    15 g    Apply topically 2 times daily as needed    Balanitis       oxyCODONE-acetaminophen 5-325 MG per tablet    PERCOCET    90 tablet    Take 1 tablet by mouth every 4 hours as needed for moderate to severe pain    Chronic bilateral low back pain without sciatica       priLOSEC 10 MG CR capsule   Generic drug:  omeprazole      Take 20 mg by mouth 2 times daily        UNABLE TO FIND      MEDICATION NAME: Tumeric

## 2018-10-09 NOTE — NURSING NOTE
"Patient states that he has not been feeling well for a month. He had a spider bite, and is feeling ill. Running temps 99.6-99.9, had cortisone injection yesterday with Dr. Feldman. Patient is fasting today, if labs are needed. Darryn Ruiz LPN      10/9/2018    8:22 AM  Injectable Influenza Immunization Documentation    1.  Has the patient received the information for the injectable influenza vaccine? YES     2. Is the patient 6 months of age or older? YES     3. Does the patient have any of the following contraindications?         Severe allergy to eggs? No     Severe allergic reaction to previous influenza vaccines? No   Severe allergy to latex? No       History of Guillain-[place] syndrome? No     Currently have a temperature greater than 100.4F? No        4.  Severely egg allergic patients should have flu vaccine eligibility assessed by an MD, RN, or pharmacist, and those who received flu vaccine should be observed for 15 min by an MD, RN, Pharmacist, Medical Technician, or member of clinic staff.\": YES    5. Latex-allergic patients should be given latex-free influenza vaccine Yes. Please reference the Vaccine latex table to determine if your clinic s product is latex-containing.       Vaccination given by Darryn Ruiz LPN      10/9/2018    8:27 AM    "

## 2018-10-10 LAB
ALBUMIN SERPL-MCNC: 4.8 G/DL (ref 3.6–4.8)
ALBUMIN/GLOB SERPL: 1.8 {RATIO} (ref 1.2–2.2)
ALP SERPL-CCNC: 62 IU/L (ref 39–117)
ALT SERPL-CCNC: 20 IU/L (ref 0–44)
AST SERPL-CCNC: 12 IU/L (ref 0–40)
BILIRUB SERPL-MCNC: 0.6 MG/DL (ref 0–1.2)
BUN SERPL-MCNC: 16 MG/DL (ref 8–27)
BUN/CREATININE RATIO: 18 (ref 10–24)
CALCIUM SERPL-MCNC: 9.2 MG/DL (ref 8.6–10.2)
CHLORIDE SERPLBLD-SCNC: 101 MMOL/L (ref 96–106)
CREAT SERPL-MCNC: 0.88 MG/DL (ref 0.76–1.27)
EGFR IF AFRICN AM: 105 ML/MIN/1.73
EGFR IF NONAFRICN AM: 91 ML/MIN/1.73
GLOBULIN, TOTAL: 2.6 G/DL (ref 1.5–4.5)
GLUCOSE SERPL-MCNC: 93 MG/DL (ref 65–99)
POTASSIUM SERPL-SCNC: 4.1 MMOL/L (ref 3.5–5.2)
PROT SERPL-MCNC: 7.4 G/DL (ref 6–8.5)
SODIUM SERPL-SCNC: 140 MMOL/L (ref 134–144)
TOTAL CO2: 25 MMOL/L (ref 20–29)
TSH BLD-ACNC: 1.61 UIU/ML (ref 0.45–4.5)

## 2018-10-11 LAB — BETA STREP GP A CULTURE: ABNORMAL

## 2018-10-15 ENCOUNTER — OFFICE VISIT (OUTPATIENT)
Dept: FAMILY MEDICINE | Facility: CLINIC | Age: 63
End: 2018-10-15

## 2018-10-15 VITALS
DIASTOLIC BLOOD PRESSURE: 74 MMHG | HEART RATE: 80 BPM | WEIGHT: 182 LBS | BODY MASS INDEX: 27.88 KG/M2 | SYSTOLIC BLOOD PRESSURE: 122 MMHG

## 2018-10-15 DIAGNOSIS — R61 NIGHT SWEATS: ICD-10-CM

## 2018-10-15 DIAGNOSIS — J02.0 ACUTE STREPTOCOCCAL PHARYNGITIS: ICD-10-CM

## 2018-10-15 DIAGNOSIS — R01.1 NEWLY RECOGNIZED MURMUR: Primary | ICD-10-CM

## 2018-10-15 LAB
BLOOD CULTURE, ROUTINE: NORMAL
Lab: NORMAL

## 2018-10-15 PROCEDURE — 36415 COLL VENOUS BLD VENIPUNCTURE: CPT | Performed by: FAMILY MEDICINE

## 2018-10-15 PROCEDURE — 93000 ELECTROCARDIOGRAM COMPLETE: CPT | Performed by: FAMILY MEDICINE

## 2018-10-15 PROCEDURE — 99213 OFFICE O/P EST LOW 20 MIN: CPT | Performed by: FAMILY MEDICINE

## 2018-10-15 RX ORDER — PENICILLIN V POTASSIUM 500 MG/1
1000 TABLET, FILM COATED ORAL 2 TIMES DAILY
Qty: 40 TABLET | Refills: 0 | Status: SHIPPED | OUTPATIENT
Start: 2018-10-15 | End: 2018-10-29

## 2018-10-15 NOTE — PROGRESS NOTES
Problem(s) Oriented visit        SUBJECTIVE:                                                    Mane Chawla is a 62 year old male who presents to clinic today for follow up on low grade fevers and malaise. 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 He can have drenching night sweats. He still has occasional sore throat. He gets posterior headaches.       Problem list, Medication list, Allergies, and Medical/Social/Surgical histories reviewed in EPIC and updated as appropriate.   Additional history: as documented    ROS:  5 point ROS completed and negative except noted above, including Gen, CV, Resp, GI, MS      Histories:   Patient Active Problem List   Diagnosis     Psoriatic arthritis (H)     Hyperlipidemia LDL goal <160     ACP (advance care planning)     Health Care Home     Midline low back pain without sciatica     Benign essential hypertension     Encounter for long-term (current) use of medications     Past Surgical History:   Procedure Laterality Date     ARTHROSCOPY KNEE RT/LT       CARPAL TUNNEL RELEASE RT/LT       FUSION CERVICAL ANTERIOR THREE+ LEVELS       FUSION LUMBAR ANTERIOR ONE LEVEL       ROTATOR CUFF REPAIR RT/LT         Social History   Substance Use Topics     Smoking status: Former Smoker     Packs/day: 1.00     Years: 30.00     Types: Cigarettes     Quit date: 3/21/1996     Smokeless tobacco: Never Used     Alcohol use 5.0 oz/week     10 drink(s) per week     Family History   Problem Relation Age of Onset     Respiratory Mother      Cerebrovascular Disease Father      Alzheimer Disease Father            OBJECTIVE:                                                    /74  Pulse 80  Wt 82.6 kg (182 lb)  BMI 27.88 kg/m2  Body mass index is 27.88 kg/(m^2).   GENERAL APPEARANCE: Alert, no acute distress  EYES: PERRL, EOM normal, conjunctiva and lids normal  HENT: Ears and TMs normal, oral mucosa and  posterior oropharynx normal  NECK: No adenopathy,masses or thyromegaly  RESP: lungs clear to auscultation   CV: regular rhythm, rate-normal, grade 3/6 crescendo/decrescendo systolic murmur heard best over the left upper sternal border  ABDOMEN: soft, no organomegaly, masses or tenderness  LYMPHATICS: No cervical, supraclavicular or inguinal adenopathy  MS: extremities normal, no peripheral edema  Skin: no splinter hemorrhages, no janeway lesions, no osler's nodes  NEURO: Alert, oriented, speech and mentation normal  PSYCH: mentation appears normal, affect and mood normal   Labs Resulted Today:   Results for orders placed or performed in visit on 10/09/18   CBC with Diff/Plt ([facility])   Result Value Ref Range    WBC x10/cmm 11.1 (A) 3.8 - 11.0 x10/cmm    % Lymphocytes 22.6 20.5 - 51.1 %    % Monocytes 6.8 1.7 - 9.3 %    % Granulocytes 70.6 42.2 - 75.2 %    RBC x10/cmm 4.65 4.2 - 5.9 x10/cmm    Hemoglobin 14.6 13.4 - 17.5 g/dl    Hematocrit 43.5 39 - 51 %    MCV 93.5 80 - 100 fL    MCH 31.4 (A) 27.0 - 31.0 pg    MCHC 33.6 33.0 - 37.0 g/dL    Platelet Count 279 140 - 450 K/uL   Comp. Metabolic Panel (14) ([facility])   Result Value Ref Range    Glucose 93 65 - 99 mg/dL    Urea Nitrogen 16 8 - 27 mg/dL    Creatinine 0.88 0.76 - 1.27 mg/dL    eGFR If NonAfricn Am 91 >59 mL/min/1.73    eGFR If Africn Am 105 >59 mL/min/1.73    BUN/Creatinine Ratio 18 10 - 24    Sodium 140 134 - 144 mmol/L    Potassium 4.1 3.5 - 5.2 mmol/L    Chloride 101 96 - 106 mmol/L    Total CO2 25 20 - 29 mmol/L    Calcium 9.2 8.6 - 10.2 mg/dL    Protein Total 7.4 6.0 - 8.5 g/dL    Albumin 4.8 3.6 - 4.8 g/dL    Globulin, Total 2.6 1.5 - 4.5 g/dL    A/G Ratio 1.8 1.2 - 2.2    Bilirubin Total 0.6 0.0 - 1.2 mg/dL    Alkaline Phosphatase 62 39 - 117 IU/L    AST 12 0 - 40 IU/L    ALT 20 0 - 44 IU/L    Narrative    Performed at:  01 - [facility] [address], [place], [place]  [id number]  : Ahmet Craig MD, Phone:  [phone]   TSH ([facility])    Result Value Ref Range    TSH 1.610 0.450 - 4.500 uIU/mL    Narrative    Performed at:  01 - [facility] [address], [place], [place]  [id number]  : Ahmet Craig MD, Phone:  [phone]   Mono ([facility])   Result Value Ref Range    Mononucleosis Screen Neg Neg   BLOOD CULTURE, ROUTINE ([facility])   Result Value Ref Range    Blood Culture, Routine Final report     Result 1 Comment     Narrative    Performed at:  01 - [facility] [address], [place], [place]  [id number]  : Ahmet Craig MD, Phone:  [phone]   Beta Strep Grp A Cult ([facility])   Result Value Ref Range    Beta Strep Gp A Culture Comment (A)     Narrative    Performed at:  01 - [facility] [address], [place], [place]  [id number]  : Ahmet Craig MD, Phone:  [phone]     ASSESSMENT/PLAN:                                                        Mane was seen today for recheck.    Diagnoses and all orders for this visit:    Newly recognized murmur / Night sweats  -     BLOOD CULTURE, ROUTINE ([facility])  -     BLOOD CULTURE, ROUTINE ([facility])  -     Echocardiogram Complete  -     EKG 12-lead complete w/read - Clinics  -     C-Reactive Protein  Quant ([facility])  -     Sed Rate [place] ([facility])    Still with concern for SBE given the ongoing fevers and new murmur. He has ECHO scheduled for later this week. Need to draw another two sets of blood cultures prior to starting antibiotic to treat the beta-hemolytic strep. Follow up pending response to Pen VK and ECHO result.     Acute streptococcal pharyngitis  -     penicillin V potassium (VEETID) 500 MG tablet; Take 2 tablets (1,000 mg) by mouth 2 times daily        There are no Patient Instructions on file for this visit.    The following health maintenance items are reviewed in Epic and correct as of today:  Health Maintenance   Topic Date Due     HIV SCREEN (SYSTEM ASSIGNED)  11/20/1971     COLON CANCER SCREEN (SYSTEM ASSIGNED)  12/12/2017     ADVANCE  DIRECTIVE PLANNING Q5 YRS  03/25/2018     TIFFANIE QUESTIONNAIRE 1 YEAR  06/01/2019     PHQ-9 Q1YR  06/01/2019     LIPID SCREEN Q5 YR MALE (SYSTEM ASSIGNED)  07/18/2019     TETANUS IMMUNIZATION (SYSTEM ASSIGNED)  08/03/2019     INFLUENZA VACCINE  Completed     HEPATITIS C SCREENING  Completed       Cassie Lombardo MD  [facility]  Family Practice  [facility]  [phone]    For any issues my office # is [phone]

## 2018-10-15 NOTE — MR AVS SNAPSHOT
After Visit Summary   10/15/2018    Mane Chawla    MRN: [id number]           Patient Information     Date Of Birth          1955        Visit Information        Provider Department      10/15/2018 2:00 PM Cassie Lombardo MD [facility]        Today's Diagnoses     Newly recognized murmur    -  1    Night sweats        Acute streptococcal pharyngitis           Follow-ups after your visit        Your next 10 appointments already scheduled     Oct 18, 2018  3:00 PM CDT   Ech Complete with [id number]   [facility] CV Echocardiography (Cardiovascular Imaging at [facility])    [address] [place] [address] [phone]           1.  Please bring or wear a comfortable two-piece outfit. 2.  You may eat, drink and take your normal medicines. 3.  For any questions that cannot be answered, please contact the ordering physician 4.  Please do not wear perfumes or scented lotions on the day of your exam.              Who to contact     If you have questions or need follow up information about today's clinic visit or your schedule please contact [facility] directly at [phone].  Normal or non-critical lab and imaging results will be communicated to you by ReconRoboticshart, letter or phone within 4 business days after the clinic has received the results. If you do not hear from us within 7 days, please contact the clinic through MusicGremlint or phone. If you have a critical or abnormal lab result, we will notify you by phone as soon as possible.  Submit refill requests through Hortor or call your pharmacy and they will forward the refill request to us. Please allow 3 business days for your refill to be completed.          Additional Information About Your Visit        ReconRoboticshart Information     Hortor gives you secure access to your electronic health record. If you see a primary care provider, you can also send messages to your care team and  make appointments. If you have questions, please call your primary care clinic.  If you do not have a primary care provider, please call [phone] and they will assist you.        Care EveryWhere ID     This is your Care EveryWhere ID. This could be used by other organizations to access your [facility] medical records  [id number]        Your Vitals Were     Pulse BMI (Body Mass Index)                80 27.88 kg/m2           Blood Pressure from Last 3 Encounters:   10/15/18 122/74   10/09/18 138/84   09/11/18 160/72    Weight from Last 3 Encounters:   10/15/18 82.6 kg (182 lb)   10/09/18 82.1 kg (181 lb)   09/11/18 83 kg (183 lb)              We Performed the Following     BLOOD CULTURE, ROUTINE ([facility])     BLOOD CULTURE, ROUTINE ([facility])     C-Reactive Protein  Quant ([facility])     Echocardiogram Complete     EKG 12-lead complete w/read - Clinics     Sed Rate Westergren ([facility])     VENOUS COLLECTION          Today's Medication Changes          These changes are accurate as of 10/15/18  7:52 PM.  If you have any questions, ask your nurse or doctor.               Start taking these medicines.        Dose/Directions    penicillin V potassium 500 MG tablet   Commonly known as:  VEETID   Used for:  Acute streptococcal pharyngitis   Started by:  Cassie Lombardo MD        Dose:  1000 mg   Take 2 tablets (1,000 mg) by mouth 2 times daily   Quantity:  40 tablet   Refills:  0            Where to get your medicines      These medications were sent to [facility] [address] [place] [address] [address] [facility] [address], [facility] [address]     Phone:  [phone]     penicillin V potassium 500 MG tablet                Primary Care Provider Office Phone # Fax #    Cassie Lombardo [phone] [address] [place] [address]        Equal Access to Services     DANIEL BAIG AH: yousif Ordonez,  kenzie goncalves ah. So [facility] [phone].    ATENCIÓN: Si alvino fairbanks, tiene a morales disposición servicios gratuitos de asistencia lingüística. Otilia al [phone].    We comply with applicable federal civil rights laws and [place] laws. We do not discriminate on the basis of race, color, national origin, age, disability, sex, sexual orientation, or gender identity.            Thank you!     Thank you for choosing [facility]  for your care. Our goal is always to provide you with excellent care. Hearing back from our patients is one way we can continue to improve our services. Please take a few minutes to complete the written survey that you may receive in the mail after your visit with us. Thank you!             Your Updated Medication List - Protect others around you: Learn how to safely use, store and throw away your medicines at www.disposemymeds.org.          This list is accurate as of 10/15/18  7:52 PM.  Always use your most recent med list.                   Brand Name Dispense Instructions for use Diagnosis    ADVIL PO      Take 800 mg by mouth 2 times daily        amLODIPine 10 MG tablet    NORVASC    90 tablet    Take 1 tablet (10 mg) by mouth daily    Benign essential hypertension       CIMETIDINE ACID REDUCER PO      Take 1 tablet by mouth every morning.        diazepam 10 MG tablet    VALIUM    30 tablet    TAKE 1 TABLET BY MOUTH EVERY NIGHT AT BEDTIME AS NEEDED FOR SLEEP OR MUSCLE SPASMS    Encounter for long-term (current) use of medications       ENBREL SURECLICK 50 MG/ML autoinjector   Generic drug:  Etanercept      Inject 50 mg Subcutaneous once a week        fish oil-omega-3 fatty acids 1000 MG capsule      Take 2 g by mouth daily        Multi-vitamin Tabs tablet      Take 1 tablet by mouth daily        nystatin-triamcinolone cream    MYCOLOG II    15 g    Apply topically 2 times daily as needed    Balanitis        oxyCODONE-acetaminophen 5-325 MG per tablet    PERCOCET    90 tablet    Take 1 tablet by mouth every 4 hours as needed for moderate to severe pain    Chronic bilateral low back pain without sciatica       penicillin V potassium 500 MG tablet    VEETID    40 tablet    Take 2 tablets (1,000 mg) by mouth 2 times daily    Acute streptococcal pharyngitis       priLOSEC 10 MG CR capsule   Generic drug:  omeprazole      Take 20 mg by mouth 2 times daily        UNABLE TO FIND      MEDICATION NAME: Tumeric

## 2018-10-16 LAB
CRP SERPL-MCNC: 0.6 MG/L (ref 0–4.9)
ERYTHROCYTE [SEDIMENTATION RATE] IN BLOOD: 10 MM/HR (ref 0–30)

## 2018-10-17 ENCOUNTER — HOSPITAL ENCOUNTER (OUTPATIENT)
Dept: CARDIOLOGY | Facility: CLINIC | Age: 63
Discharge: HOME | End: 2018-10-17
Attending: FAMILY MEDICINE | Admitting: FAMILY MEDICINE
Payer: COMMERCIAL

## 2018-10-17 DIAGNOSIS — R01.1 NEWLY RECOGNIZED MURMUR: ICD-10-CM

## 2018-10-17 PROCEDURE — 40000264 ECHO COMPLETE WITH OPTISON

## 2018-10-17 PROCEDURE — 25500064 ZZH RX 255 OP 636: Performed by: FAMILY MEDICINE

## 2018-10-17 RX ADMIN — HUMAN ALBUMIN MICROSPHERES AND PERFLUTREN 9 ML: 10; .22 INJECTION, SOLUTION INTRAVENOUS at 15:56

## 2018-10-21 LAB
BLOOD CULTURE, ROUTINE: NORMAL
BLOOD CULTURE, ROUTINE: NORMAL
Lab: NORMAL
Lab: NORMAL

## 2018-10-29 ENCOUNTER — OFFICE VISIT (OUTPATIENT)
Dept: FAMILY MEDICINE | Facility: CLINIC | Age: 63
End: 2018-10-29

## 2018-10-29 VITALS
SYSTOLIC BLOOD PRESSURE: 148 MMHG | OXYGEN SATURATION: 97 % | HEART RATE: 73 BPM | DIASTOLIC BLOOD PRESSURE: 70 MMHG | BODY MASS INDEX: 30.63 KG/M2 | TEMPERATURE: 98 F | WEIGHT: 200 LBS

## 2018-10-29 DIAGNOSIS — G89.29 CHRONIC BILATERAL LOW BACK PAIN WITHOUT SCIATICA: ICD-10-CM

## 2018-10-29 DIAGNOSIS — M54.50 CHRONIC BILATERAL LOW BACK PAIN WITHOUT SCIATICA: ICD-10-CM

## 2018-10-29 DIAGNOSIS — R61 NIGHT SWEATS: Primary | ICD-10-CM

## 2018-10-29 LAB
% GRANULOCYTES: 67.3 % (ref 42.2–75.2)
HCT VFR BLD AUTO: 40.7 % (ref 39–51)
HEMOGLOBIN: 13.5 G/DL (ref 13.4–17.5)
LYMPHOCYTES NFR BLD AUTO: 26.1 % (ref 20.5–51.1)
MCH RBC QN AUTO: 31.3 PG (ref 27–31)
MCHC RBC AUTO-ENTMCNC: 33.3 G/DL (ref 33–37)
MCV RBC AUTO: 94.2 FL (ref 80–100)
MONOCYTES NFR BLD AUTO: 6.6 % (ref 1.7–9.3)
PLATELET # BLD AUTO: 172 K/UL (ref 140–450)
RBC # BLD AUTO: 4.32 X10/CMM (ref 4.2–5.9)
WBC # BLD AUTO: 9.5 X10/CMM (ref 3.8–11)

## 2018-10-29 PROCEDURE — 99213 OFFICE O/P EST LOW 20 MIN: CPT | Performed by: FAMILY MEDICINE

## 2018-10-29 PROCEDURE — 36415 COLL VENOUS BLD VENIPUNCTURE: CPT | Performed by: FAMILY MEDICINE

## 2018-10-29 PROCEDURE — 85025 COMPLETE CBC W/AUTO DIFF WBC: CPT | Performed by: FAMILY MEDICINE

## 2018-10-29 RX ORDER — OXYCODONE AND ACETAMINOPHEN 5; 325 MG/1; MG/1
1 TABLET ORAL EVERY 4 HOURS PRN
Qty: 90 TABLET | Refills: 0 | Status: SHIPPED | OUTPATIENT
Start: 2018-10-29 | End: 2019-03-28

## 2018-10-29 NOTE — PROGRESS NOTES
Problem(s) Oriented visit        SUBJECTIVE:                                                    Mane Chawla is a 62 year old male who presents to clinic today for recheck of night sweats, typically happening every night at about 3:00 am. He gets up with this and transfers to a different bed that is cooler. He keeps the thermostat at 68 degrees. He has not been taking his temperature during this time frame. He still relates this back to when he was bit by a spider. At that time he developed what looked like a cellulitis and it was treated with Keflex. He recently finished a course of PenVK for an non-group B strep and he reports that his throat feels better, but he still otherwise feels the same. No new symptoms to report.      Problem list, Medication list, Allergies, and Medical/Social/Surgical histories reviewed in EPIC and updated as appropriate.   Additional history: as documented    ROS:  5 point ROS completed and negative except noted above, including Gen, CV, Resp, GI, MS      Histories:   Patient Active Problem List   Diagnosis     Psoriatic arthritis (H)     Hyperlipidemia LDL goal <160     ACP (advance care planning)     Health Care Home     Midline low back pain without sciatica     Benign essential hypertension     Encounter for long-term (current) use of medications     Past Surgical History:   Procedure Laterality Date     ARTHROSCOPY KNEE RT/LT       CARPAL TUNNEL RELEASE RT/LT       FUSION CERVICAL ANTERIOR THREE+ LEVELS       FUSION LUMBAR ANTERIOR ONE LEVEL       ROTATOR CUFF REPAIR RT/LT         Social History   Substance Use Topics     Smoking status: Former Smoker     Packs/day: 1.00     Years: 30.00     Types: Cigarettes     Quit date: 3/21/1996     Smokeless tobacco: Never Used     Alcohol use 5.0 oz/week     10 drink(s) per week     Family History   Problem Relation Age of Onset     Respiratory Mother      Cerebrovascular Disease Father      Alzheimer Disease Father            OBJECTIVE:                                                     /70  Pulse 73  Temp 98  F (36.7  C) (Oral)  Wt 90.7 kg (200 lb)  SpO2 97%  BMI 30.63 kg/m2  Body mass index is 30.63 kg/(m^2).   GENERAL APPEARANCE: Alert, no acute distress  EYES: PERRL, EOM normal, conjunctiva and lids normal  HENT: Ears and TMs normal, oral mucosa and posterior oropharynx normal  NECK: No adenopathy,masses or thyromegaly  RESP: lungs clear to auscultation   CV: regular rhythm, grade 2-3/6 systolic murmur heard best over the RUSB  ABDOMEN: soft, no organomegaly, masses or tenderness  LYMPHATICS: No cervical, supraclavicular, axillary, or inguinal adenopathy  MS: extremities normal, no peripheral edema, no petechia, no oslers nodes, no splinter hemorrhages  NEURO: Alert, oriented, speech and mentation normal  PSYCH: mentation appears normal, affect and mood normal   Labs Resulted Today:   Results for orders placed or performed in visit on 10/29/18   CBC with Diff/Plt ([facility]   Result Value Ref Range    WBC x10/cmm 9.5 3.8 - 11.0 x10/cmm    % Lymphocytes 26.1 20.5 - 51.1 %    % Monocytes 6.6 1.7 - 9.3 %    % Granulocytes 67.3 42.2 - 75.2 %    RBC x10/cmm 4.32 4.2 - 5.9 x10/cmm    Hemoglobin 13.5 13.4 - 17.5 g/dl    Hematocrit 40.7 39 - 51 %    MCV 94.2 80 - 100 fL    MCH 31.3 (A) 27.0 - 31.0 pg    MCHC 33.3 33.0 - 37.0 g/dL    Platelet Count 172 140 - 450 K/uL   HIV 1/0/2 Rflx ([facility])   Result Value Ref Range    HIV Screen 4th Gen with Rflx Non Reactive Non Reactive    Narrative    Performed at:  01 - [facility] [address], [place], [place]  [id number]  : Ahmet Craig MD, Phone:  [phone]   Beta Strep Grp A Cult ([facility])   Result Value Ref Range    Beta Strep Gp A Culture Negative     Narrative    Performed at:  01 - [facility] [address], [place], [place]  [id number]  : Ahmet Craig MD, Phone:  [phone]     ASSESSMENT/PLAN:                                                        Mane was seen  today for fatigue and perspiration.    Diagnoses and all orders for this visit:    Night sweats, etiology remains unclear  -     CBC with Diff/Plt ([facility])  -     HIV 1/0/2 Rflx ([facility])  -     Beta Strep Grp A Cult ([facility])  Mane is asked to actually take his temperature and document if these sweats are indeed related to fevers. If they are, he will be given referral to ID. If not, referral to endocrine would be more appropriate.    Chronic bilateral low back pain without sciatica  -     oxyCODONE-acetaminophen (PERCOCET) 5-325 MG per tablet; Take 1 tablet by mouth every 4 hours as needed for moderate to severe pain        There are no Patient Instructions on file for this visit.    The following health maintenance items are reviewed in Epic and correct as of today:  Health Maintenance   Topic Date Due     HIV SCREEN (SYSTEM ASSIGNED)  11/20/1971     COLON CANCER SCREEN (SYSTEM ASSIGNED)  12/12/2017     ADVANCE DIRECTIVE PLANNING Q5 YRS  03/25/2018     TIFFANIE QUESTIONNAIRE 1 YEAR  06/01/2019     PHQ-9 Q1YR  06/01/2019     LIPID SCREEN Q5 YR MALE (SYSTEM ASSIGNED)  07/18/2019     TETANUS IMMUNIZATION (SYSTEM ASSIGNED)  08/03/2019     INFLUENZA VACCINE  Completed     HEPATITIS C SCREENING  Completed       Cassie Lombardo MD  [facility]  Family Practice  [facility]  [phone]    For any issues my office # is [phone]

## 2018-10-29 NOTE — MR AVS SNAPSHOT
After Visit Summary   10/29/2018    Mane Chawla    MRN: [id number]           Patient Information     Date Of Birth          1955        Visit Information        Provider Department      10/29/2018 4:15 PM Cassie Lombardo MD [facility]        Today's Diagnoses     Night sweats    -  1    Chronic bilateral low back pain without sciatica           Follow-ups after your visit        Who to contact     If you have questions or need follow up information about today's clinic visit or your schedule please contact [facility] directly at [phone].  Normal or non-critical lab and imaging results will be communicated to you by Daily Pichart, letter or phone within 4 business days after the clinic has received the results. If you do not hear from us within 7 days, please contact the clinic through Yip[employer]st or phone. If you have a critical or abnormal lab result, we will notify you by phone as soon as possible.  Submit refill requests through [employer] or call your pharmacy and they will forward the refill request to us. Please allow 3 business days for your refill to be completed.          Additional Information About Your Visit        MyChart Information     [employer] gives you secure access to your electronic health record. If you see a primary care provider, you can also send messages to your care team and make appointments. If you have questions, please call your primary care clinic.  If you do not have a primary care provider, please call [phone] and they will assist you.        Care EveryWhere ID     This is your Care EveryWhere ID. This could be used by other organizations to access your [facility] medical records  [id number]        Your Vitals Were     Pulse Temperature Pulse Oximetry BMI (Body Mass Index)          73 98  F (36.7  C) (Oral) 97% 30.63 kg/m2         Blood Pressure from Last 3 Encounters:   10/29/18 148/70   10/15/18 122/74   10/09/18 138/84    Weight  from Last 3 Encounters:   10/29/18 90.7 kg (200 lb)   10/15/18 82.6 kg (182 lb)   10/09/18 82.1 kg (181 lb)              We Performed the Following     Beta Strep Grp A Cult ([facility])     CBC with Diff/Plt ([facility])     HIV 1/0/2 Rflx ([facility])          Where to get your medicines      Some of these will need a paper prescription and others can be bought over the counter.  Ask your nurse if you have questions.     Bring a paper prescription for each of these medications     oxyCODONE-acetaminophen 5-325 MG per tablet         Information about OPIOIDS     PRESCRIPTION OPIOIDS: WHAT YOU NEED TO KNOW   We gave you an opioid (narcotic) pain medicine. It is important to manage your pain, but opioids are not always the best choice. You should first try all the other options your care team gave you. Take this medicine for as short a time (and as few doses) as possible.    Some activities can increase your pain, such as bandage changes or therapy sessions. It may help to take your pain medicine 30 to 60 minutes before these activities. Reduce your stress by getting enough sleep, working on hobbies you enjoy and practicing relaxation or meditation. Talk to your care team about ways to manage your pain beyond prescription opioids.    These medicines have risks:    DO NOT drive when on new or higher doses of pain medicine. These medicines can affect your alertness and reaction times, and you could be arrested for driving under the influence (DUI). If you need to use opioids long-term, talk to your care team about driving.    DO NOT operate heavy machinery    DO NOT do any other dangerous activities while taking these medicines.    DO NOT drink any alcohol while taking these medicines.     If the opioid prescribed includes acetaminophen, DO NOT take with any other medicines that contain acetaminophen. Read all labels carefully. Look for the word  acetaminophen  or  Tylenol.  Ask your pharmacist if you have questions or are  unsure.    You can get addicted to pain medicines, especially if you have a history of addiction (chemical, alcohol or substance dependence). Talk to your care team about ways to reduce this risk.    All opioids tend to cause constipation. Drink plenty of water and eat foods that have a lot of fiber, such as fruits, vegetables, prune juice, apple juice and high-fiber cereal. Take a laxative (Miralax, milk of magnesia, Colace, Senna) if you don t move your bowels at least every other day. Other side effects include upset stomach, sleepiness, dizziness, throwing up, tolerance (needing more of the medicine to have the same effect), physical dependence and slowed breathing.    Store your pills in a secure place, locked if possible. We will not replace any lost or stolen medicine. If you don t finish your medicine, please throw away (dispose) as directed by your pharmacist. The [place] Pollution [employer] has more information about safe disposal: https://www.pca.[facility].[place].us/living-green/managing-unwanted-medications         Primary Care Provider Office Phone # Fax #    Cassie Annemarie Lombardo [phone] [address] [place] [address]        Equal Access to Services     DANIEL BAIG : Demarco Coker, wareynoldda rosibel, qaybta kaalmada dread, kenzie marshall. So [facility] [phone].    ATENCIÓN: Si habla español, tiene a morales disposición servicios gratuitos de asistencia lingüística. Llame al [phone].    We comply with applicable federal civil rights laws and [place] laws. We do not discriminate on the basis of race, color, national origin, age, disability, sex, sexual orientation, or gender identity.            Thank you!     Thank you for choosing [facility]  for your care. Our goal is always to provide you with excellent care. Hearing back from our patients is one way we can continue to improve our services. Please  take a few minutes to complete the written survey that you may receive in the mail after your visit with us. Thank you!             Your Updated Medication List - Protect others around you: Learn how to safely use, store and throw away your medicines at www.disposemymeds.org.          This list is accurate as of 10/29/18 11:59 PM.  Always use your most recent med list.                   Brand Name Dispense Instructions for use Diagnosis    ADVIL PO      Take 800 mg by mouth 2 times daily        amLODIPine 10 MG tablet    NORVASC    90 tablet    Take 1 tablet (10 mg) by mouth daily    Benign essential hypertension       CIMETIDINE ACID REDUCER PO      Take 1 tablet by mouth every morning.        diazepam 10 MG tablet    VALIUM    30 tablet    TAKE 1 TABLET BY MOUTH EVERY NIGHT AT BEDTIME AS NEEDED FOR SLEEP OR MUSCLE SPASMS    Encounter for long-term (current) use of medications       ENBREL SURECLICK 50 MG/ML autoinjector   Generic drug:  Etanercept      Inject 50 mg Subcutaneous once a week        fish oil-omega-3 fatty acids 1000 MG capsule      Take 2 g by mouth daily        Multi-vitamin Tabs tablet      Take 1 tablet by mouth daily        nystatin-triamcinolone cream    MYCOLOG II    15 g    Apply topically 2 times daily as needed    Balanitis       oxyCODONE-acetaminophen 5-325 MG per tablet    PERCOCET    90 tablet    Take 1 tablet by mouth every 4 hours as needed for moderate to severe pain    Chronic bilateral low back pain without sciatica       priLOSEC 10 MG CR capsule   Generic drug:  omeprazole      Take 20 mg by mouth 2 times daily        UNABLE TO FIND      MEDICATION NAME: Tumeric

## 2018-10-31 LAB — HIV SCREEN 4TH GEN WITH RFLX: NON REACTIVE

## 2018-11-02 LAB — BETA STREP GP A CULTURE: NEGATIVE

## 2018-11-12 ENCOUNTER — TELEPHONE (OUTPATIENT)
Dept: FAMILY MEDICINE | Facility: CLINIC | Age: 63
End: 2018-11-12

## 2018-11-12 DIAGNOSIS — R61 NIGHT SWEATS: Primary | ICD-10-CM

## 2018-11-12 NOTE — TELEPHONE ENCOUNTER
Called patient with Dr. Lombardo's advice to proceed with endocrine consult. Patient agrees. Referral, visit notes, labs and CXR faxed to [facility] asking them to call patient to schedule consult.  Elizabeth Morris RN

## 2018-11-12 NOTE — TELEPHONE ENCOUNTER
----- Message from Cassie Lombardo MD sent at 11/12/2018 10:42 AM CST -----  Regarding: RE: night sweat update   We had discussed referral to endocrinology for night sweats. Please proceed with this.  Thanks.  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 He has monitored for past week and no fevers with the sweats.   Let me know if you want us to call them with any info/plan/recommendations.   Thanks  Elizabeth

## 2018-11-15 NOTE — TELEPHONE ENCOUNTER
Per [facility] will not see patient without endo dx. Call placed to [facility] who will see patient. Called and spoke with patient with update, phone number for ID given.  Referral entered-all records in Epic.

## 2018-12-11 ENCOUNTER — TRANSFERRED RECORDS (OUTPATIENT)
Dept: FAMILY MEDICINE | Facility: CLINIC | Age: 63
End: 2018-12-11

## 2019-02-07 ENCOUNTER — TRANSFERRED RECORDS (OUTPATIENT)
Dept: FAMILY MEDICINE | Facility: CLINIC | Age: 64
End: 2019-02-07

## 2019-02-28 ENCOUNTER — OFFICE VISIT (OUTPATIENT)
Dept: FAMILY MEDICINE | Facility: CLINIC | Age: 64
End: 2019-02-28

## 2019-02-28 VITALS
SYSTOLIC BLOOD PRESSURE: 148 MMHG | BODY MASS INDEX: 29.19 KG/M2 | DIASTOLIC BLOOD PRESSURE: 74 MMHG | HEART RATE: 88 BPM | RESPIRATION RATE: 16 BRPM | OXYGEN SATURATION: 98 % | WEIGHT: 190.6 LBS

## 2019-02-28 DIAGNOSIS — I10 BENIGN ESSENTIAL HYPERTENSION: ICD-10-CM

## 2019-02-28 DIAGNOSIS — Z13.6 SCREENING FOR AAA (ABDOMINAL AORTIC ANEURYSM): ICD-10-CM

## 2019-02-28 DIAGNOSIS — Z87.891 HISTORY OF SMOKING: ICD-10-CM

## 2019-02-28 DIAGNOSIS — M48.062 SPINAL STENOSIS OF LUMBAR REGION WITH NEUROGENIC CLAUDICATION: Primary | ICD-10-CM

## 2019-02-28 PROCEDURE — 99214 OFFICE O/P EST MOD 30 MIN: CPT | Performed by: FAMILY MEDICINE

## 2019-02-28 RX ORDER — HYDROMORPHONE HYDROCHLORIDE 2 MG/1
TABLET ORAL
Qty: 90 TABLET | Refills: 0 | Status: SHIPPED | OUTPATIENT
Start: 2019-02-28 | End: 2019-02-28

## 2019-02-28 RX ORDER — HYDROCHLOROTHIAZIDE 12.5 MG/1
12.5 TABLET ORAL DAILY
Qty: 90 TABLET | Refills: 3 | Status: SHIPPED | OUTPATIENT
Start: 2019-02-28 | End: 2019-08-13

## 2019-02-28 RX ORDER — HYDROMORPHONE HYDROCHLORIDE 2 MG/1
TABLET ORAL
Qty: 21 TABLET | Refills: 0 | COMMUNITY
Start: 2019-02-28 | End: 2019-03-28

## 2019-02-28 NOTE — PROGRESS NOTES
Fax from pharmacy that patient was only able to get #21 tablets of Hydromorphone prescription.  Insurance will only pay for 7 day supply due to this being the first fill.  Patient filled the  21 pills and will let us know if he needs a new prescription for the rest of the prescription.

## 2019-02-28 NOTE — PROGRESS NOTES
"Problem(s) Oriented visit        SUBJECTIVE:                                                    Mane Chawla is a 63 year old male who presents to clinic today for narcotic refill. He has chronic back pain. He is soon going to get a spinal simulator. In the meantime he uses 1/2 - 1 oxycodone daily and \"lots of ibuprofen,\" reporting 600-800 mg of ibuprofen 3-4 times a day. If he takes more oxycodone he feels groggy and creepy. This barely takes the edge off along with ibuprofen and a few beers.    He has elevated blood pressure. When he had a sample stimulator put in his blood pressure was down to 120/70 due to less pain.     Problem list, Medication list, Allergies, and Medical/Social/Surgical histories reviewed in EPIC and updated as appropriate.   Additional history: as documented    ROS:  5 point ROS completed and negative except noted above, including Gen, CV, Resp, GI, MS      Histories:   Patient Active Problem List   Diagnosis     Psoriatic arthritis (H)     Hyperlipidemia LDL goal <160     ACP (advance care planning)     Health Care Home     Midline low back pain without sciatica     Benign essential hypertension     Encounter for long-term (current) use of medications     Past Surgical History:   Procedure Laterality Date     ARTHROSCOPY KNEE RT/LT       CARPAL TUNNEL RELEASE RT/LT       FUSION CERVICAL ANTERIOR THREE+ LEVELS       FUSION LUMBAR ANTERIOR ONE LEVEL       ROTATOR CUFF REPAIR RT/LT         Social History     Tobacco Use     Smoking status: Former Smoker     Packs/day: 1.00     Years: 30.00     Pack years: 30.00     Types: Cigarettes     Last attempt to quit: 3/21/1996     Years since quittin.9     Smokeless tobacco: Never Used   Substance Use Topics     Alcohol use: Yes     Alcohol/week: 5.0 oz     Types: 10 drink(s) per week     Family History   Problem Relation Age of Onset     Respiratory Mother      Cerebrovascular Disease Father      Alzheimer Disease Father            OBJECTIVE:  "                                                   /74   Pulse 88   Resp 16   Wt 86.5 kg (190 lb 9.6 oz)   SpO2 98%   BMI 29.19 kg/m    Body mass index is 29.19 kg/m .   GENERAL APPEARANCE: Alert, no acute distress  NECK: No adenopathy,masses or thyromegaly  RESP: lungs clear to auscultation   CV: normal rate, regular rhythm, no murmur or gallop  ABDOMEN: soft, no organomegaly, masses or tenderness  MS: extremities normal, no peripheral edema  NEURO: Alert, oriented, speech and mentation normal  PSYCH: mentation appears normal, affect and mood normal   Labs Resulted Today:   Results for orders placed or performed in visit on 10/29/18   CBC with Diff/Plt ([facility] [place])   Result Value Ref Range    WBC x10/cmm 9.5 3.8 - 11.0 x10/cmm    % Lymphocytes 26.1 20.5 - 51.1 %    % Monocytes 6.6 1.7 - 9.3 %    % Granulocytes 67.3 42.2 - 75.2 %    RBC x10/cmm 4.32 4.2 - 5.9 x10/cmm    Hemoglobin 13.5 13.4 - 17.5 g/dl    Hematocrit 40.7 39 - 51 %    MCV 94.2 80 - 100 fL    MCH 31.3 (A) 27.0 - 31.0 pg    MCHC 33.3 33.0 - 37.0 g/dL    Platelet Count 172 140 - 450 K/uL   HIV 1/0/2 Rflx ([facility])   Result Value Ref Range    HIV Screen 4th Gen with Rflx Non Reactive Non Reactive    Narrative    Performed at:  01 - [facility] [address], [place], [place]  [id number]  : Ahmet Craig MD, Phone:  [phone]   Beta Strep Grp A Cult ([facility])   Result Value Ref Range    Beta Strep Gp A Culture Negative     Narrative    Performed at:  01 - [facility] [address], [place], [place]  [id number]  : Ahmet Craig MD, Phone:  [phone]     ASSESSMENT/PLAN:                                                        Mane was seen today for recheck medication.    Diagnoses and all orders for this visit:    Spinal stenosis of lumbar region with neurogenic claudication  -     HYDROmorphone (DILAUDID) 2 MG tablet; 1/2 - 1 tab TID prn severe pain  Due to bad side effects of oxycodone he is given trial of  dilaudid.    Screening for AAA (abdominal aortic aneurysm)  -     Abdominal Aortic Aneurysm Screening/Tracking  -     Referral to [facility]    History of smoking  -     Abdominal Aortic Aneurysm Screening/Tracking  Screen due to smoking history.    Benign essential hypertension  -     hydrochlorothiazide (HYDRODIURIL) 12.5 MG tablet; Take 1 tablet (12.5 mg) by mouth daily  Discussed the need to treat better. He is agreeable to add hydrochlorothiazide while we wait for the stimulator to be placed.      There are no Patient Instructions on file for this visit.    The following health maintenance items are reviewed in Epic and correct as of today:  Health Maintenance   Topic Date Due     HIV SCREEN (SYSTEM ASSIGNED)  11/20/1971     ZOSTER IMMUNIZATION (1 of 2) 11/20/2003     COLON CANCER SCREEN (SYSTEM ASSIGNED)  12/12/2017     ADVANCE DIRECTIVE PLANNING Q5 YRS  03/25/2018     MEDICARE ANNUAL WELLNESS VISIT  11/20/2018     FALL RISK ASSESSMENT  11/20/2018     TIFFANIE QUESTIONNAIRE 1 YEAR  06/01/2019     PHQ-9 Q1YR  06/01/2019     LIPID SCREEN Q5 YR MALE (SYSTEM ASSIGNED)  07/18/2019     DTAP/TDAP/TD IMMUNIZATION (2 - Td) 08/03/2019     INFLUENZA VACCINE  Completed     AORTIC ANEURYSM SCREENING (SYSTEM ASSIGNED)  Completed     HEPATITIS C SCREENING  Completed     IPV IMMUNIZATION  Aged Out     MENINGITIS IMMUNIZATION  Aged Out       Cassie Lombardo MD  [facility]  Family Practice  [facility]  [phone]    For any issues my office # is [phone]

## 2019-03-14 NOTE — RESULT ENCOUNTER NOTE
Dear Mane,   I am covering for Dr. Lombardo while she is out of the office. Your included test results are within normal ranges. I do not suggest that we make any changes at this time.    Filemon Magana M.D.

## 2019-03-28 ENCOUNTER — OFFICE VISIT (OUTPATIENT)
Dept: FAMILY MEDICINE | Facility: CLINIC | Age: 64
End: 2019-03-28

## 2019-03-28 VITALS
DIASTOLIC BLOOD PRESSURE: 84 MMHG | SYSTOLIC BLOOD PRESSURE: 140 MMHG | OXYGEN SATURATION: 98 % | WEIGHT: 188.6 LBS | BODY MASS INDEX: 28.89 KG/M2 | TEMPERATURE: 97.8 F | HEART RATE: 70 BPM | RESPIRATION RATE: 16 BRPM

## 2019-03-28 DIAGNOSIS — L40.50 PSORIATIC ARTHRITIS (H): ICD-10-CM

## 2019-03-28 DIAGNOSIS — Z01.818 PREOP GENERAL PHYSICAL EXAM: Primary | ICD-10-CM

## 2019-03-28 DIAGNOSIS — I10 BENIGN ESSENTIAL HYPERTENSION: ICD-10-CM

## 2019-03-28 DIAGNOSIS — M48.062 SPINAL STENOSIS OF LUMBAR REGION WITH NEUROGENIC CLAUDICATION: ICD-10-CM

## 2019-03-28 LAB
% GRANULOCYTES: 63.4 % (ref 42.2–75.2)
HCT VFR BLD AUTO: 40.2 % (ref 39–51)
HEMOGLOBIN: 13.5 G/DL (ref 13.4–17.5)
LYMPHOCYTES NFR BLD AUTO: 30.5 % (ref 20.5–51.1)
MCH RBC QN AUTO: 31.5 PG (ref 27–31)
MCHC RBC AUTO-ENTMCNC: 33.6 G/DL (ref 33–37)
MCV RBC AUTO: 93.5 FL (ref 80–100)
MONOCYTES NFR BLD AUTO: 6.1 % (ref 1.7–9.3)
PLATELET # BLD AUTO: 259 K/UL (ref 140–450)
RBC # BLD AUTO: 4.3 X10/CMM (ref 4.2–5.9)
WBC # BLD AUTO: 7.9 X10/CMM (ref 3.8–11)

## 2019-03-28 PROCEDURE — 99215 OFFICE O/P EST HI 40 MIN: CPT | Performed by: FAMILY MEDICINE

## 2019-03-28 PROCEDURE — 85025 COMPLETE CBC W/AUTO DIFF WBC: CPT | Performed by: FAMILY MEDICINE

## 2019-03-28 PROCEDURE — 93000 ELECTROCARDIOGRAM COMPLETE: CPT | Performed by: FAMILY MEDICINE

## 2019-03-28 PROCEDURE — 36415 COLL VENOUS BLD VENIPUNCTURE: CPT | Performed by: FAMILY MEDICINE

## 2019-03-28 RX ORDER — HYDROMORPHONE HYDROCHLORIDE 2 MG/1
TABLET ORAL
Qty: 21 TABLET | Refills: 0 | Status: SHIPPED | OUTPATIENT
Start: 2019-03-28 | End: 2019-04-18

## 2019-03-28 RX ORDER — DIPHENOXYLATE HYDROCHLORIDE AND ATROPINE SULFATE 2.5; .025 MG/1; MG/1
1 TABLET ORAL
COMMUNITY
Start: 2014-10-01

## 2019-03-28 ASSESSMENT — ANXIETY QUESTIONNAIRES
1. FEELING NERVOUS, ANXIOUS, OR ON EDGE: NOT AT ALL
5. BEING SO RESTLESS THAT IT IS HARD TO SIT STILL: NOT AT ALL
GAD7 TOTAL SCORE: 1
3. WORRYING TOO MUCH ABOUT DIFFERENT THINGS: SEVERAL DAYS
7. FEELING AFRAID AS IF SOMETHING AWFUL MIGHT HAPPEN: NOT AT ALL
2. NOT BEING ABLE TO STOP OR CONTROL WORRYING: NOT AT ALL
6. BECOMING EASILY ANNOYED OR IRRITABLE: NOT AT ALL
IF YOU CHECKED OFF ANY PROBLEMS ON THIS QUESTIONNAIRE, HOW DIFFICULT HAVE THESE PROBLEMS MADE IT FOR YOU TO DO YOUR WORK, TAKE CARE OF THINGS AT HOME, OR GET ALONG WITH OTHER PEOPLE: NOT DIFFICULT AT ALL

## 2019-03-28 ASSESSMENT — PATIENT HEALTH QUESTIONNAIRE - PHQ9
5. POOR APPETITE OR OVEREATING: NOT AT ALL
SUM OF ALL RESPONSES TO PHQ QUESTIONS 1-9: 5

## 2019-03-28 NOTE — PROGRESS NOTES
[facility]  [address] [place] [id number] [phone]  Dept: [phone]    PRE-OP EVALUATION:  Today's date: 3/28/2019    Mane Chawla (: 1955) presents for pre-operative evaluation assessment as requested by Dr. Feldman.  He requires evaluation and anesthesia risk assessment prior to undergoing surgery/procedure for treatment of Spine .    Proposed Surgery/ Procedure: Spinal Cord Stimulator  Date of Surgery/ Procedure: 19  Time of Surgery/ Procedure: [facility]/Surgical Facility: [facility]  Fax number for surgical facility: Unknown  Primary Physician: Cassie Lombardo  Type of Anesthesia Anticipated: to be determined    Patient has a Health Care Directive or Living Will:  NO    1. NO - Do you have a history of heart attack, stroke, stent, bypass or surgery on an artery in the head, neck, heart or legs?  2. NO - Do you ever have any pain or discomfort in your chest?  3. NO - Do you have a history of  Heart Failure?  4. NO - Are you troubled by shortness of breath when: walking on the level, up a slight hill or at night?  5. NO - Do you currently have a cold, bronchitis or other respiratory infection?  6. NO - Do you have a cough, shortness of breath or wheezing?  7. NO - Do you sometimes get pains in the calves of your legs when you walk?  8. NO - Do you or anyone in your family have previous history of blood clots?  9. NO - Do you or does anyone in your family have a serious bleeding problem such as prolonged bleeding following surgeries or cuts?  10. NO - Have you ever had problems with anemia or been told to take iron pills?  11. NO - Have you had any abnormal blood loss such as black, tarry or bloody stools, or abnormal vaginal bleeding?  12. NO - Have you ever had a blood transfusion?  13. NO - Have you or any of your relatives ever had problems with anesthesia?  14. NO - Do you have sleep apnea, excessive snoring or daytime drowsiness?  15. NO  - Do you have any prosthetic heart valves?  16. NO - Do you have prosthetic joints?  17. NO - Is there any chance that you may be pregnant?      HPI:     HPI related to upcoming procedure: patient has spinal stenosis and multilevel disc disease, scheduled for spinal stimulator. He has already had multi-level fusion lower.       HYPERTENSION - Patient has longstanding history of HTN , currently denies any symptoms referable to elevated blood pressure. Specifically denies chest pain, palpitations, dyspnea, orthopnea, PND or peripheral edema. Blood pressure readings have been in normal range. Current medication regimen is as listed below. Patient denies any side effects of medication.                                                                                                                                                                                          .  PSORIATIC ARTHRITIS: Treats with lots of ibuprofen. Previously treated with Enbrel but it is cost prohibitive.    DYSPEPSIA: treats with omeprazole and cimetidine. He drinks 16 ounces of coffee daily and 24 ounces of beer and a glass of wine daily.     MEDICAL HISTORY:     Patient Active Problem List    Diagnosis Date Noted     Encounter for long-term (current) use of medications 03/13/2018     Priority: Medium     Benign essential hypertension 12/04/2017     Priority: Medium     Midline low back pain without sciatica 10/26/2015     Priority: Medium     Health Care Home 10/29/2013     Priority: Medium     State Tier Level:  Tier 1   August 25, 2014             ACP (advance care planning)      Priority: Medium     Hyperlipidemia LDL goal <160 06/15/2011     Priority: Medium     Psoriatic arthritis (H)      Priority: Medium      Past Medical History:   Diagnosis Date     ACP (advance care planning) 3-25-13    form given     GERD (gastroesophageal reflux disease)      HTN, goal below 140/80      Hypercholesterolemia      Hyperlipidemia LDL goal <160  "6/15/2011     Psoriatic arthritis (H)      Past Surgical History:   Procedure Laterality Date     ARTHROSCOPY KNEE RT/LT       CARPAL TUNNEL RELEASE RT/LT       FUSION CERVICAL ANTERIOR THREE+ LEVELS       FUSION LUMBAR ANTERIOR ONE LEVEL       ROTATOR CUFF REPAIR RT/LT       Current Outpatient Medications   Medication Sig Dispense Refill     amLODIPine (NORVASC) 10 MG tablet Take 1 tablet (10 mg) by mouth daily 90 tablet 3     CIMETIDINE ACID REDUCER PO Take 1 tablet by mouth every morning.       diazepam (VALIUM) 10 MG tablet TAKE 1 TABLET BY MOUTH EVERY NIGHT AT BEDTIME AS NEEDED FOR SLEEP OR MUSCLE SPASMS 30 tablet 0     ENBREL SURECLICK 50 MG/ML autoinjector Inject 50 mg Subcutaneous once a week   0     fish oil-omega-3 fatty acids (FISH OIL) 1000 MG capsule Take 2 g by mouth daily       hydrochlorothiazide (HYDRODIURIL) 12.5 MG tablet Take 1 tablet (12.5 mg) by mouth daily 90 tablet 3     HYDROmorphone (DILAUDID) 2 MG tablet 1 tab TID prn severe pain 21 tablet 0     Ibuprofen (ADVIL PO) Take 800 mg by mouth 2 times daily        Multiple Vitamin (MULTI-VITAMINS) TABS Take 1 tablet by mouth       multivitamin, therapeutic with minerals (MULTI-VITAMIN) TABS tablet Take 1 tablet by mouth daily       nystatin-triamcinolone (MYCOLOG II) cream Apply topically 2 times daily as needed 15 g 0     omeprazole (PRILOSEC) 10 MG CR capsule Take 20 mg by mouth 2 times daily        UNABLE TO FIND MEDICATION NAME: Tumeric       OTC products: None, except as noted above    Allergies   Allergen Reactions     Lisinopril Fatigue     Cipro [Quinolones] Other (See Comments)     Achy joints     Codeine Nausea     per h&p     Codeine Camsylate Nausea     Fluocinolone Other (See Comments)     \"achy joints\"     Hmg-Coa-R Inhibitors Other (See Comments)     As provastatin- fatigue  On rovustatin  As of 10/1/14      Latex Allergy: NO    Social History     Tobacco Use     Smoking status: Former Smoker     Packs/day: 1.00     Years: 30.00    "  Pack years: 30.00     Types: Cigarettes     Last attempt to quit: 3/21/1996     Years since quittin.0     Smokeless tobacco: Never Used   Substance Use Topics     Alcohol use: Yes     Alcohol/week: 5.0 oz     Types: 10 drink(s) per week     History   Drug Use No       REVIEW OF SYSTEMS:   Constitutional, neuro, ENT, endocrine, pulmonary, cardiac, gastrointestinal, genitourinary, musculoskeletal, integument and psychiatric systems are negative, except as otherwise noted.    EXAM:   /82   Pulse 70   Temp 97.8  F (36.6  C) (Oral)   Resp 16   Wt 85.5 kg (188 lb 9.6 oz)   SpO2 98%   BMI 28.89 kg/m      GENERAL APPEARANCE: healthy, alert and no distress     EYES: EOMI,  PERRL     HENT: ear canals and TM's normal and nose and mouth without ulcers or lesions     NECK: no adenopathy, no asymmetry, masses, or scars and thyroid normal to palpation     RESP: lungs clear to auscultation - no rales, rhonchi or wheezes     CV: regular rates and rhythm, normal S1 S2, no S3 or S4 and no murmur, click or rub     ABDOMEN:  soft, nontender, no HSM or masses and bowel sounds normal     MS: extremities normal- no gross deformities noted, no evidence of inflammation in joints, FROM in all extremities.     SKIN: no suspicious lesions or rashes     NEURO: Normal strength and tone, sensory exam grossly normal, mentation intact and speech normal     PSYCH: mentation appears normal. and affect normal/bright     LYMPHATICS: No cervical adenopathy    DIAGNOSTICS:   EKG: NSR, regular rate, no murmur/gallop/rub. No ST elevation or depression. Normal R wave progression.    Recent Labs   Lab Test 10/29/18  1709 10/09/18  0954 10/09/18  0913 18  1432   HGB 13.5  --  14.6  --      --  279  --    NA  --  140  --  141   POTASSIUM  --  4.1  --  4.0   CR  --  0.88  --  0.97        IMPRESSION:   Reason for surgery/procedure: chronic back pain   Diagnosis/reason for consult: preoperative clearance.    The proposed surgical  procedure is considered INTERMEDIATE risk.    REVISED CARDIAC RISK INDEX  The patient has the following serious cardiovascular risks for perioperative complications such as (MI, PE, VFib and 3  AV Block):  No serious cardiac risks  INTERPRETATION: 0 risks: Class I (very low risk - 0.4% complication rate)    The patient has the following additional risks for perioperative complications:  No identified additional risks      ICD-10-CM    1. Preop general physical exam Z01.818    2. Spinal stenosis of lumbar region with neurogenic claudication M48.062 HYDROmorphone (DILAUDID) 2 MG tablet       RECOMMENDATIONS:         --Patient is to take all scheduled medications on the day of surgery EXCEPT for modifications listed below.  Stop Fish Oil one week prior to surgery.  Stop NSAID 3 days prior to surgery.    Patient is given refill today of hydromorphone. He tolerates this better than oxycodone which he has used in the past. This should be considered when treating post op pain.    APPROVAL GIVEN to proceed with proposed procedure, without further diagnostic evaluation      Signed Electronically by: Cassie Lombardo MD    Copy of this evaluation report is provided to requesting physician.    [facility] Preop Guidelines    Revised Cardiac Risk Index

## 2019-03-29 LAB
BUN SERPL-MCNC: 16 MG/DL (ref 8–27)
BUN/CREATININE RATIO: 20 (ref 10–24)
CALCIUM SERPL-MCNC: 9.4 MG/DL (ref 8.6–10.2)
CHLORIDE SERPLBLD-SCNC: 103 MMOL/L (ref 96–106)
CREAT SERPL-MCNC: 0.82 MG/DL (ref 0.76–1.27)
EGFR IF AFRICN AM: 107 ML/MIN/1.73
EGFR IF NONAFRICN AM: 93 ML/MIN/1.73
GLUCOSE SERPL-MCNC: 92 MG/DL (ref 65–99)
POTASSIUM SERPL-SCNC: 4.4 MMOL/L (ref 3.5–5.2)
SODIUM SERPL-SCNC: 142 MMOL/L (ref 134–144)
TOTAL CO2: 25 MMOL/L (ref 20–29)

## 2019-03-29 ASSESSMENT — ANXIETY QUESTIONNAIRES: GAD7 TOTAL SCORE: 1

## 2019-04-03 NOTE — PROGRESS NOTES
4/2/19 faxed preop, EKG and labs to Dr. Feldman @ [phone]    Nacho Palacios,   [facility]  [phone]

## 2019-04-05 DIAGNOSIS — Z79.899 ENCOUNTER FOR LONG-TERM (CURRENT) USE OF MEDICATIONS: ICD-10-CM

## 2019-04-05 DIAGNOSIS — M62.838 MUSCLE SPASM: Primary | ICD-10-CM

## 2019-04-08 RX ORDER — DIAZEPAM 10 MG
TABLET ORAL
Qty: 30 TABLET | Refills: 0 | Status: SHIPPED | OUTPATIENT
Start: 2019-04-08 | End: 2019-08-13

## 2019-04-08 NOTE — TELEPHONE ENCOUNTER
Patient requesting refill on diazepam 10mg #30.   Last visit: 3/28/19 preop for spinal stimulator placement on 4/16/19  Future visit: none  Controlled Substance Agreement : 4/27/18  Per [place] PDMP this med was last filled 10/3/18 for #30 tabs ordered by Dr. Lombardo.  Routed request to Dr. Lombardo for review.  Elizabeth Morris RN

## 2019-04-17 DIAGNOSIS — M48.062 SPINAL STENOSIS OF LUMBAR REGION WITH NEUROGENIC CLAUDICATION: ICD-10-CM

## 2019-04-17 NOTE — TELEPHONE ENCOUNTER
"April 17, 2019  2pm  Patient calls stating was scheduled to have surgery for spinal cord stimulator implantation yesterday 4/16, but was cancelled due to battery problems. At this time he has not been told when it may be happening. He is asking if Dr. Lombardo could give him another month of pain meds. Currently using dilaudid 2mg q pm but finds this does not control his pain as well as 1/2 tab of percocet 5/325 did. But was changed due to felt \"crummy, flu-like\" with percocet. Only uses 1 dose per day in the evening due to can't take it while at work.     Per [place] PDMP, controlled substance fills for past year:   4/9/19 diazepam 10mg #30   3/28/19 hydromorphone 2mg #21  2/2819 hydromorphone 2mg #21  10/31/18 percocet 5/325 #90  10/3/18 diazepam 10mg #30  7/13/18 percocet 5/325 #90       "

## 2019-04-18 NOTE — TELEPHONE ENCOUNTER
Per Dr. Lombardo OK to increase dose Dilaudid to 4mg. Prescription entered and routed to Dr. Lombardo for approval. Will call patient when prescription ready for  at clinic. Becky Williamson

## 2019-04-22 RX ORDER — HYDROMORPHONE HYDROCHLORIDE 4 MG/1
TABLET ORAL
Qty: 30 TABLET | Refills: 0 | Status: SHIPPED | OUTPATIENT
Start: 2019-04-22 | End: 2019-05-28

## 2019-05-28 ENCOUNTER — OFFICE VISIT (OUTPATIENT)
Dept: FAMILY MEDICINE | Facility: CLINIC | Age: 64
End: 2019-05-28

## 2019-05-28 VITALS
BODY MASS INDEX: 28.18 KG/M2 | OXYGEN SATURATION: 96 % | RESPIRATION RATE: 18 BRPM | DIASTOLIC BLOOD PRESSURE: 78 MMHG | WEIGHT: 184 LBS | HEART RATE: 82 BPM | SYSTOLIC BLOOD PRESSURE: 140 MMHG

## 2019-05-28 DIAGNOSIS — M48.062 SPINAL STENOSIS OF LUMBAR REGION WITH NEUROGENIC CLAUDICATION: ICD-10-CM

## 2019-05-28 DIAGNOSIS — Z23 NEED FOR PNEUMOCOCCAL VACCINE: Primary | ICD-10-CM

## 2019-05-28 PROCEDURE — 90670 PCV13 VACCINE IM: CPT | Performed by: FAMILY MEDICINE

## 2019-05-28 PROCEDURE — 99213 OFFICE O/P EST LOW 20 MIN: CPT | Mod: 25 | Performed by: FAMILY MEDICINE

## 2019-05-28 PROCEDURE — G0009 ADMIN PNEUMOCOCCAL VACCINE: HCPCS | Performed by: FAMILY MEDICINE

## 2019-05-28 RX ORDER — HYDROMORPHONE HYDROCHLORIDE 4 MG/1
4 TABLET ORAL AT BEDTIME
Qty: 30 TABLET | Refills: 0 | Status: SHIPPED | OUTPATIENT
Start: 2019-05-28 | End: 2019-08-01

## 2019-05-28 RX ORDER — HYDROMORPHONE HYDROCHLORIDE 4 MG/1
4 TABLET ORAL AT BEDTIME
Qty: 30 TABLET | Refills: 0 | Status: SHIPPED | OUTPATIENT
Start: 2019-06-27 | End: 2019-08-01

## 2019-05-28 RX ORDER — HYDROMORPHONE HYDROCHLORIDE 2 MG/1
TABLET ORAL
Refills: 0 | COMMUNITY
Start: 2019-03-28 | End: 2019-05-28

## 2019-05-28 NOTE — PROGRESS NOTES
Problem(s) Oriented visit        SUBJECTIVE:                                                    Mane Chawla is a 63 year old male who presents to clinic today for refill of narcotics. He is waiting for nerve stimulator to be inserted, but despite having doctor and insurance approval the hospital has not yet given approval for the battery to be inserted. He is therefore needing refill of his pain medication. He limits himself to hydromorphone 2 mg daily. He keeps diazepam to infrequent only when he is unable to sleep.     He has turned 63, went on Medicare, and is no longer getting coverage for Enbrel. He now has pain in his hands and shoulders and significantly decreased hand ROM due to swelling.       Problem list, Medication list, Allergies, and Medical/Social/Surgical histories reviewed in EPIC and updated as appropriate.   Additional history: as documented    ROS:  5 point ROS completed and negative except noted above, including Gen, CV, Resp, GI, MS      Histories:   Patient Active Problem List   Diagnosis     Psoriatic arthritis (H)     Hyperlipidemia LDL goal <160     ACP (advance care planning)     Health Care Home     Midline low back pain without sciatica     Benign essential hypertension     Encounter for long-term (current) use of medications     Past Surgical History:   Procedure Laterality Date     ARTHROSCOPY KNEE RT/LT       CARPAL TUNNEL RELEASE RT/LT       FUSION CERVICAL ANTERIOR THREE+ LEVELS       FUSION LUMBAR ANTERIOR ONE LEVEL       ROTATOR CUFF REPAIR RT/LT         Social History     Tobacco Use     Smoking status: Former Smoker     Packs/day: 1.00     Years: 30.00     Pack years: 30.00     Types: Cigarettes     Last attempt to quit: 3/21/1996     Years since quittin.2     Smokeless tobacco: Never Used   Substance Use Topics     Alcohol use: Yes     Alcohol/week: 5.0 oz     Types: 10 drink(s) per week     Family History   Problem Relation Age of Onset     Respiratory Mother       Cerebrovascular Disease Father      Alzheimer Disease Father            OBJECTIVE:                                                    /78   Pulse 82   Resp 18   Wt 83.5 kg (184 lb)   SpO2 96%   BMI 28.18 kg/m    Body mass index is 28.18 kg/m .   GENERAL APPEARANCE: Alert, no acute distress  MS: finger joints are swollen bilaterally and his ability to hand  is significantly decreased in both hands.  NEURO: Alert, oriented, speech and mentation normal  PSYCH: mentation appears normal, affect and mood normal   Labs Resulted Today:   Results for orders placed or performed in visit on 03/28/19   Basic Metabolic Panel (8) ([facility])   Result Value Ref Range    Glucose 92 65 - 99 mg/dL    Urea Nitrogen 16 8 - 27 mg/dL    Creatinine 0.82 0.76 - 1.27 mg/dL    eGFR If NonAfricn Am 93 >59 mL/min/1.73    eGFR If Africn Am 107 >59 mL/min/1.73    BUN/Creatinine Ratio 20 10 - 24    Sodium 142 134 - 144 mmol/L    Potassium 4.4 3.5 - 5.2 mmol/L    Chloride 103 96 - 106 mmol/L    Total CO2 25 20 - 29 mmol/L    Calcium 9.4 8.6 - 10.2 mg/dL    Narrative    Performed at:  01 - [facility] [address], [place], [place]  [id number]  : Ahmet Craig MD, Phone:  [phone]   CBC with Diff/Plt ([facility] [place])   Result Value Ref Range    WBC x10/cmm 7.9 3.8 - 11.0 x10/cmm    % Lymphocytes 30.5 20.5 - 51.1 %    % Monocytes 6.1 1.7 - 9.3 %    % Granulocytes 63.4 42.2 - 75.2 %    RBC x10/cmm 4.30 4.2 - 5.9 x10/cmm    Hemoglobin 13.5 13.4 - 17.5 g/dl    Hematocrit 40.2 39 - 51 %    MCV 93.5 80 - 100 fL    MCH 31.5 (A) 27.0 - 31.0 pg    MCHC 33.6 33.0 - 37.0 g/dL    Platelet Count 259 140 - 450 K/uL     ASSESSMENT/PLAN:                                                        Mane was seen today for recheck medication.    Diagnoses and all orders for this visit:    Need for pneumococcal vaccine  -     PNEUMOCOCCAL CONJ VACCINE 13 VALENT IM  -     ADMIN PNEUMOCOCCAL VACCINE    Spinal stenosis of lumbar region with neurogenic  claudication  -     HYDROmorphone (DILAUDID) 4 MG tablet; Take 1 tablet (4 mg) by mouth At Bedtime  -     HYDROmorphone (DILAUDID) 4 MG tablet; Take 1 tablet (4 mg) by mouth At Bedtime  He is given a two month supply of Hydromorphone assuming the use of one pill daily in order to allow him to sleep. He is not asking for any increased amount. He understands that once he has his stimulator implanted he is expected to stop his narcotic use.      There are no Patient Instructions on file for this visit.    The following health maintenance items are reviewed in Epic and correct as of today:  Health Maintenance   Topic Date Due     HIV SCREENING  11/20/1968     LIPID  11/20/1988     ZOSTER IMMUNIZATION (1 of 2) 11/20/2003     ADVANCED DIRECTIVE PLANNING  03/25/2018     MEDICARE ANNUAL WELLNESS VISIT  11/20/2018     DTAP/TDAP/TD IMMUNIZATION (2 - Td) 08/03/2019     TIFFANIE ASSESSMENT  03/28/2020     FALL RISK ASSESSMENT  03/28/2020     PHQ-9  03/28/2020     COLONOSCOPY  07/12/2028     HEPATITIS C SCREENING  Completed     INFLUENZA VACCINE  Completed     AORTIC ANEURYSM SCREENING (SYSTEM ASSIGNED)  Completed     IPV IMMUNIZATION  Aged Out     MENINGITIS IMMUNIZATION  Aged Out       Cassie Lombardo MD  [facility]  Family Practice  [facility]  [phone]    For any issues my office # is [phone]

## 2019-08-01 ENCOUNTER — OFFICE VISIT (OUTPATIENT)
Dept: FAMILY MEDICINE | Facility: CLINIC | Age: 64
End: 2019-08-01

## 2019-08-01 VITALS
RESPIRATION RATE: 16 BRPM | DIASTOLIC BLOOD PRESSURE: 70 MMHG | BODY MASS INDEX: 27.74 KG/M2 | OXYGEN SATURATION: 98 % | HEART RATE: 70 BPM | WEIGHT: 183 LBS | HEIGHT: 68 IN | SYSTOLIC BLOOD PRESSURE: 124 MMHG

## 2019-08-01 DIAGNOSIS — M48.062 SPINAL STENOSIS OF LUMBAR REGION WITH NEUROGENIC CLAUDICATION: ICD-10-CM

## 2019-08-01 PROCEDURE — 99213 OFFICE O/P EST LOW 20 MIN: CPT | Performed by: FAMILY MEDICINE

## 2019-08-01 RX ORDER — HYDROMORPHONE HYDROCHLORIDE 4 MG/1
4 TABLET ORAL AT BEDTIME
Qty: 30 TABLET | Refills: 0 | Status: SHIPPED | OUTPATIENT
Start: 2019-08-01 | End: 2019-09-11

## 2019-08-01 ASSESSMENT — MIFFLIN-ST. JEOR: SCORE: 1589.58

## 2019-08-01 NOTE — PROGRESS NOTES
Problem(s) Oriented visit        SUBJECTIVE:                                                    Mane Chawla is a 63 year old male who presents to clinic today for ongoing back pain. He is starting to have more numbness in his left leg and foot. No incontinence of bladder or bowels. He thinks the left leg is losing some of the strength. It is getting harder to get in and out of his car. He is still waiting on approval of a battery for an implantable device to help with this. He uses dilaudid when the back pain is extreme. He takes one 4 mg dose when he gets home from work.       Problem list, Medication list, Allergies, and Medical/Social/Surgical histories reviewed in EPIC and updated as appropriate.   Additional history: as documented    ROS:  5 point ROS completed and negative except noted above, including Gen, CV, Resp, GI, MS      Histories:   Patient Active Problem List   Diagnosis     Psoriatic arthritis (H)     Hyperlipidemia LDL goal <160     ACP (advance care planning)     Health Care Home     Midline low back pain without sciatica     Benign essential hypertension     Encounter for long-term (current) use of medications     Past Surgical History:   Procedure Laterality Date     ARTHROSCOPY KNEE RT/LT       CARPAL TUNNEL RELEASE RT/LT       FUSION CERVICAL ANTERIOR THREE+ LEVELS       FUSION LUMBAR ANTERIOR ONE LEVEL       ROTATOR CUFF REPAIR RT/LT         Social History     Tobacco Use     Smoking status: Former Smoker     Packs/day: 1.00     Years: 30.00     Pack years: 30.00     Types: Cigarettes     Last attempt to quit: 3/21/1996     Years since quittin.3     Smokeless tobacco: Never Used   Substance Use Topics     Alcohol use: Yes     Alcohol/week: 5.0 oz     Types: 10 drink(s) per week     Family History   Problem Relation Age of Onset     Respiratory Mother      Cerebrovascular Disease Father      Alzheimer Disease Father            OBJECTIVE:                                                  "   /70   Pulse 70   Resp 16   Ht 1.727 m (5' 8\")   Wt 83 kg (183 lb)   SpO2 98%   BMI 27.83 kg/m    Body mass index is 27.83 kg/m .   GENERAL APPEARANCE: Alert, no acute distress  MS: extremities normal, no peripheral edema  MS: Motor strength in the left leg, both proximally and distally are diminished 4/5 compared to the right leg 5/5.   NEURO: Alert, oriented, speech and mentation normal  PSYCH: mentation appears normal, affect and mood normal   Labs Resulted Today:   Results for orders placed or performed in visit on 03/28/19   Basic Metabolic Panel (8) ([facility])   Result Value Ref Range    Glucose 92 65 - 99 mg/dL    Urea Nitrogen 16 8 - 27 mg/dL    Creatinine 0.82 0.76 - 1.27 mg/dL    eGFR If NonAfricn Am 93 >59 mL/min/1.73    eGFR If Africn Am 107 >59 mL/min/1.73    BUN/Creatinine Ratio 20 10 - 24    Sodium 142 134 - 144 mmol/L    Potassium 4.4 3.5 - 5.2 mmol/L    Chloride 103 96 - 106 mmol/L    Total CO2 25 20 - 29 mmol/L    Calcium 9.4 8.6 - 10.2 mg/dL    Narrative    Performed at:  01 - [facility]  [address] [place] [address]  [id number]  : Ahmet Craig MD, Phone:  [phone]   CBC with Diff/Plt ([facility])   Result Value Ref Range    WBC x10/cmm 7.9 3.8 - 11.0 x10/cmm    % Lymphocytes 30.5 20.5 - 51.1 %    % Monocytes 6.1 1.7 - 9.3 %    % Granulocytes 63.4 42.2 - 75.2 %    RBC x10/cmm 4.30 4.2 - 5.9 x10/cmm    Hemoglobin 13.5 13.4 - 17.5 g/dl    Hematocrit 40.2 39 - 51 %    MCV 93.5 80 - 100 fL    MCH 31.5 (A) 27.0 - 31.0 pg    MCHC 33.6 33.0 - 37.0 g/dL    Platelet Count 259 140 - 450 K/uL     ASSESSMENT/PLAN:                                                        Mane was seen today for recheck medication.    Diagnoses and all orders for this visit:    Spinal stenosis of lumbar region with neurogenic claudication  -     HYDROmorphone (DILAUDID) 4 MG tablet; Take 1 tablet (4 mg) by mouth At Bedtime    Getting more urgent to treat with sensation changes and decreased " strength.    There are no Patient Instructions on file for this visit.    The following health maintenance items are reviewed in Epic and correct as of today:  Health Maintenance   Topic Date Due     ZOSTER IMMUNIZATION (1 of 2) 11/20/2003     ADVANCE CARE PLANNING  03/25/2018     MEDICARE ANNUAL WELLNESS VISIT  11/20/2018     LIPID  07/18/2019     DTAP/TDAP/TD IMMUNIZATION (2 - Td) 08/03/2019     INFLUENZA VACCINE (1) 09/01/2019     TIFFANIE ASSESSMENT  03/28/2020     FALL RISK ASSESSMENT  03/28/2020     PHQ-9  03/28/2020     PNEUMOCOCCAL IMMUNIZATION 65+ LOW/MEDIUM RISK (2 of 2 - PPSV23) 10/26/2020     COLONOSCOPY  07/12/2028     HEPATITIS C SCREENING  Completed     HIV SCREENING  Completed     AORTIC ANEURYSM SCREENING (SYSTEM ASSIGNED)  Completed     IPV IMMUNIZATION  Aged Out     MENINGITIS IMMUNIZATION  Aged Out       Cassie Lombardo MD  [facility]  Family Practice  [facility]  [phone]    For any issues my office # is [phone]

## 2019-08-13 ENCOUNTER — OFFICE VISIT (OUTPATIENT)
Dept: FAMILY MEDICINE | Facility: CLINIC | Age: 64
End: 2019-08-13

## 2019-08-13 VITALS
WEIGHT: 183.4 LBS | SYSTOLIC BLOOD PRESSURE: 154 MMHG | BODY MASS INDEX: 27.89 KG/M2 | DIASTOLIC BLOOD PRESSURE: 92 MMHG | OXYGEN SATURATION: 97 % | HEART RATE: 74 BPM

## 2019-08-13 DIAGNOSIS — I10 BENIGN ESSENTIAL HYPERTENSION: ICD-10-CM

## 2019-08-13 DIAGNOSIS — M62.838 MUSCLE SPASM: ICD-10-CM

## 2019-08-13 DIAGNOSIS — M25.50 POLYARTHRALGIA: Primary | ICD-10-CM

## 2019-08-13 LAB
% GRANULOCYTES: 72.8 % (ref 42.2–75.2)
ERYTHROCYTE [SEDIMENTATION RATE] IN BLOOD: 14 MM/HR (ref 0–15)
HCT VFR BLD AUTO: 40.5 % (ref 39–51)
HEMOGLOBIN: 13.7 G/DL (ref 13.4–17.5)
LYMPHOCYTES NFR BLD AUTO: 21.2 % (ref 20.5–51.1)
MCH RBC QN AUTO: 30.6 PG (ref 27–31)
MCHC RBC AUTO-ENTMCNC: 34 G/DL (ref 33–37)
MCV RBC AUTO: 89.8 FL (ref 80–100)
MONOCYTES NFR BLD AUTO: 6 % (ref 1.7–9.3)
PLATELET # BLD AUTO: 292 K/UL (ref 140–450)
RBC # BLD AUTO: 4.5 X10/CMM (ref 4.2–5.9)
WBC # BLD AUTO: 9.2 X10/CMM (ref 3.8–11)

## 2019-08-13 PROCEDURE — 99214 OFFICE O/P EST MOD 30 MIN: CPT | Performed by: FAMILY MEDICINE

## 2019-08-13 PROCEDURE — 85651 RBC SED RATE NONAUTOMATED: CPT | Performed by: FAMILY MEDICINE

## 2019-08-13 PROCEDURE — 36415 COLL VENOUS BLD VENIPUNCTURE: CPT | Performed by: FAMILY MEDICINE

## 2019-08-13 PROCEDURE — 85025 COMPLETE CBC W/AUTO DIFF WBC: CPT | Performed by: FAMILY MEDICINE

## 2019-08-13 RX ORDER — HYDROCHLOROTHIAZIDE 25 MG/1
25 TABLET ORAL DAILY
Qty: 90 TABLET | Refills: 3 | Status: SHIPPED | OUTPATIENT
Start: 2019-08-13 | End: 2020-08-18

## 2019-08-13 RX ORDER — DIAZEPAM 10 MG
TABLET ORAL
Qty: 30 TABLET | Refills: 0 | Status: SHIPPED | OUTPATIENT
Start: 2019-08-13 | End: 2019-12-31

## 2019-08-13 NOTE — PROGRESS NOTES
Problem(s) Oriented visit        SUBJECTIVE:                                                    Mane Chawla is a 63 year old male who presents to clinic today for two weeks of feeling poorly. He still has night sweats which are not new. No fevers. He has a bite behind his right ear and is uncertain if this was a tick or any other bug. He feels fatigue, body aches, joint aches, even his skin hurts. No sore throat. He stopped Enbrel 3-4 months ago. He is still waiting to get something else approved for treating his psoriatic arthritis.    He also needs for diazepam which he uses sparingly for sleep when he is really hurting. He notes about twice weekly use.      Problem list, Medication list, Allergies, and Medical/Social/Surgical histories reviewed in EPIC and updated as appropriate.   Additional history: as documented    ROS:  5 point ROS completed and negative except noted above, including Gen, CV, Resp, GI, MS      Histories:   Patient Active Problem List   Diagnosis     Psoriatic arthritis (H)     Hyperlipidemia LDL goal <160     ACP (advance care planning)     Health Care Home     Midline low back pain without sciatica     Benign essential hypertension     Encounter for long-term (current) use of medications     Past Surgical History:   Procedure Laterality Date     ARTHROSCOPY KNEE RT/LT       CARPAL TUNNEL RELEASE RT/LT       FUSION CERVICAL ANTERIOR THREE+ LEVELS       FUSION LUMBAR ANTERIOR ONE LEVEL       ROTATOR CUFF REPAIR RT/LT         Social History     Tobacco Use     Smoking status: Former Smoker     Packs/day: 1.00     Years: 30.00     Pack years: 30.00     Types: Cigarettes     Last attempt to quit: 3/21/1996     Years since quittin.4     Smokeless tobacco: Never Used   Substance Use Topics     Alcohol use: Yes     Alcohol/week: 5.0 oz     Types: 10 drink(s) per week     Family History   Problem Relation Age of Onset     Respiratory Mother      Cerebrovascular Disease Father      Alzheimer  Disease Father            OBJECTIVE:                                                    BP (!) 154/92   Pulse 74   Wt 83.2 kg (183 lb 6.4 oz)   SpO2 97%   BMI 27.89 kg/m    Body mass index is 27.89 kg/m .   GENERAL APPEARANCE: Alert, no acute distress  HENT: Ears and TMs normal, oral mucosa and posterior oropharynx normal  NECK: No adenopathy,masses or thyromegaly  RESP: lungs clear to auscultation   CV: normal rate, regular rhythm, no murmur or gallop  LYMPHATICS: No cervical, supraclavicular or inguinal adenopathy  MS: extremities normal, no peripheral edema  NEURO: Alert, oriented, speech and mentation normal  PSYCH: mentation appears normal, affect and mood normal   Labs Resulted Today:   Results for orders placed or performed in visit on 08/13/19   [place]ed [place]   Result Value Ref Range    Sed Rate 14 0 - 15 mm/hr   CBC with Diff/Plt ([facility]   Result Value Ref Range    WBC x10/cmm 9.2 3.8 - 11.0 x10/cmm    % Lymphocytes 21.2 20.5 - 51.1 %    % Monocytes 6.0 1.7 - 9.3 %    % Granulocytes 72.8 42.2 - 75.2 %    RBC x10/cmm 4.50 4.2 - 5.9 x10/cmm    Hemoglobin 13.7 13.4 - 17.5 g/dl    Hematocrit 40.5 39 - 51 %    MCV 89.8 80 - 100 fL    MCH 30.6 27.0 - 31.0 pg    MCHC 34.0 33.0 - 37.0 g/dL    Platelet Count 292 140 - 450 K/uL     ASSESSMENT/PLAN:                                                        Mane was seen today for consult.    Diagnoses and all orders for this visit:    Polyarthralgia  -     Sed Rate [place] ([facility]  -     C-Reactive Protein  Quant ([facility])  -     CBC with Diff/Plt ([facility]  -     Parvovirus B19  Human  IgG/IgM ([facility])  -     Lyme  Total Ab Test/Reflex ([facility])  Etiology uncertain. This could be due to his psoriatic arthritis. He is concerned about a tick bite. The spot behind his right ear looks simply like a mosquito bite that had been scratched. Given that this has been two weeks from noticing the spot that it is reasonable to check for Lyme. Follow up pending labs.      Muscle spasm  -     diazepam (VALIUM) 10 MG tablet; TAKE 1 TABLET BY MOUTH EVERY NIGHT AT BEDTIME AS NEEDED FOR SLEEP OR MUSCLE SPASMS    Benign essential hypertension  -     hydrochlorothiazide (HYDRODIURIL) 25 MG tablet; Take 1 tablet (25 mg) by mouth daily  Not at goal. He will increase his hydrochlorothiazide to 25 mg daily, continue amlodipine. Recheck in one month.       There are no Patient Instructions on file for this visit.    The following health maintenance items are reviewed in Epic and correct as of today:  Health Maintenance   Topic Date Due     ZOSTER IMMUNIZATION (1 of 2) 11/20/2003     ADVANCE CARE PLANNING  03/25/2018     MEDICARE ANNUAL WELLNESS VISIT  11/20/2018     LIPID  07/18/2019     DTAP/TDAP/TD IMMUNIZATION (2 - Td) 08/03/2019     INFLUENZA VACCINE (1) 09/01/2019     TIFFANIE ASSESSMENT  03/28/2020     FALL RISK ASSESSMENT  03/28/2020     PHQ-9  03/28/2020     PNEUMOCOCCAL IMMUNIZATION 65+ LOW/MEDIUM RISK (2 of 2 - PPSV23) 10/26/2020     COLONOSCOPY  07/12/2028     HEPATITIS C SCREENING  Completed     HIV SCREENING  Completed     AORTIC ANEURYSM SCREENING (SYSTEM ASSIGNED)  Completed     IPV IMMUNIZATION  Aged Out     MENINGITIS IMMUNIZATION  Aged Out       Cassie Lombardo MD  [facility]  Family Practice  [facility]  [phone]    For any issues my office # is [phone]

## 2019-08-16 LAB
CRP SERPL-MCNC: 5 MG/L (ref 0–10)
LYME IGG/IGM AB: <0.91 ISR (ref 0–0.9)
PARVOVIRUS B19, IGG: 6.3 INDEX (ref 0–0.8)
PARVOVIRUS B19, IGM: 0.2 INDEX (ref 0–0.8)

## 2019-09-06 DIAGNOSIS — I10 BENIGN ESSENTIAL HYPERTENSION: ICD-10-CM

## 2019-09-06 RX ORDER — AMLODIPINE BESYLATE 10 MG/1
TABLET ORAL
Qty: 90 TABLET | Refills: 0 | Status: SHIPPED | OUTPATIENT
Start: 2019-09-06 | End: 2020-03-09

## 2019-09-11 ENCOUNTER — OFFICE VISIT (OUTPATIENT)
Dept: FAMILY MEDICINE | Facility: CLINIC | Age: 64
End: 2019-09-11

## 2019-09-11 VITALS
BODY MASS INDEX: 28.04 KG/M2 | DIASTOLIC BLOOD PRESSURE: 82 MMHG | OXYGEN SATURATION: 96 % | WEIGHT: 184.4 LBS | SYSTOLIC BLOOD PRESSURE: 162 MMHG | HEART RATE: 84 BPM

## 2019-09-11 DIAGNOSIS — M48.062 SPINAL STENOSIS OF LUMBAR REGION WITH NEUROGENIC CLAUDICATION: ICD-10-CM

## 2019-09-11 DIAGNOSIS — I10 BENIGN ESSENTIAL HYPERTENSION: Primary | ICD-10-CM

## 2019-09-11 PROCEDURE — 99213 OFFICE O/P EST LOW 20 MIN: CPT | Performed by: FAMILY MEDICINE

## 2019-09-11 RX ORDER — HYDROMORPHONE HYDROCHLORIDE 4 MG/1
4 TABLET ORAL AT BEDTIME
Qty: 30 TABLET | Refills: 0 | Status: SHIPPED | OUTPATIENT
Start: 2019-09-11 | End: 2019-10-10

## 2019-09-11 NOTE — PROGRESS NOTES
Problem(s) Oriented visit        SUBJECTIVE:                                                    Mane Chawla is a 63 year old male who presents to clinic today for recheck of narcotic. He is still waiting for implantable device to treat his chronic back pain with radiculopathy. He remains active but can flare after a busy day at work. He can at times feel unstable if he is on a steep roof. The left leg is more affected. He has been out of his dilaudid for a few days and reports having very poor sleep last night.    At his last visit we increased his hydrochlorothiazide to 25 mg from prior 12.5 mg. He continues to take amlodipine. He had very poor sleep last night.     Problem list, Medication list, Allergies, and Medical/Social/Surgical histories reviewed in EPIC and updated as appropriate.   Additional history: as documented    ROS:  5 point ROS completed and negative except noted above, including Gen, CV, Resp, GI, MS      Histories:   Patient Active Problem List   Diagnosis     Psoriatic arthritis (H)     Hyperlipidemia LDL goal <160     ACP (advance care planning)     Health Care Home     Midline low back pain without sciatica     Benign essential hypertension     Encounter for long-term (current) use of medications     Past Surgical History:   Procedure Laterality Date     ARTHROSCOPY KNEE RT/LT       CARPAL TUNNEL RELEASE RT/LT       FUSION CERVICAL ANTERIOR THREE+ LEVELS       FUSION LUMBAR ANTERIOR ONE LEVEL       ROTATOR CUFF REPAIR RT/LT         Social History     Tobacco Use     Smoking status: Former Smoker     Packs/day: 1.00     Years: 30.00     Pack years: 30.00     Types: Cigarettes     Last attempt to quit: 3/21/1996     Years since quittin.4     Smokeless tobacco: Never Used   Substance Use Topics     Alcohol use: Yes     Alcohol/week: 5.0 oz     Types: 10 drink(s) per week     Family History   Problem Relation Age of Onset     Respiratory Mother      Cerebrovascular Disease Father       Alzheimer Disease Father            OBJECTIVE:                                                    BP (!) 162/82   Pulse 84   Wt 83.6 kg (184 lb 6.4 oz)   SpO2 96%   BMI 28.04 kg/m    Body mass index is 28.04 kg/m .   GENERAL APPEARANCE: Alert, no acute distress  NECK: No adenopathy,masses or thyromegaly  RESP: lungs clear to auscultation   CV: normal rate, regular rhythm, no murmur or gallop  MS: extremities normal, no peripheral edema  NEURO: Alert, oriented, speech and mentation normal  PSYCH: mentation appears normal, affect and mood normal   Labs Resulted Today:   Results for orders placed or performed in visit on 08/13/19   Sed Rate Westergren ([facility])   Result Value Ref Range    Sed Rate 14 0 - 15 mm/hr   [facility]active Protein  [facility] ([facility])   Result Value Ref Range    C-Reactive Protein 5 0 - 10 mg/L    Narrative    Performed at:  03 - [facility]  [address] [place], [place]  [id number]  : Ahmet Craig MD, Phone:  [phone]   CBC with Diff/Plt ([facility])   Result Value Ref Range    WBC x10/cmm 9.2 3.8 - 11.0 x10/cmm    % Lymphocytes 21.2 20.5 - 51.1 %    % Monocytes 6.0 1.7 - 9.3 %    % Granulocytes 72.8 42.2 - 75.2 %    RBC x10/cmm 4.50 4.2 - 5.9 x10/cmm    Hemoglobin 13.7 13.4 - 17.5 g/dl    Hematocrit 40.5 39 - 51 %    MCV 89.8 80 - 100 fL    MCH 30.6 27.0 - 31.0 pg    MCHC 34.0 33.0 - 37.0 g/dL    Platelet Count 292 140 - 450 K/uL   Parvovirus B19  Human  IgG/IgM ([facility])   Result Value Ref Range    Parvovirus B19, IgG 6.3 (H) 0.0 - 0.8 index    Parvovirus B19, IgM 0.2 0.0 - 0.8 index    Narrative    Performed at:  01 - [address]  [id number]  : Lidia Scott MD, Phone:  [phone]   Lyme  Total Ab Test/Reflex ([facility])   Result Value Ref Range    Lyme IgG/IgM Ab <0.91 0.00 - 0.90 ISR    Narrative    Performed at:  02 - [facility]  [address], [place], [place]  [id number]  : MERVAT Sebastian MD, Phone:  [phone]      ASSESSMENT/PLAN:                                                        Mane was seen today for recheck medication.    Diagnoses and all orders for this visit:    Benign essential hypertension  Elevated blood pressure likely due to pain and poor sleep. He will get a home cuff and monitor during more typical time at home and report back on readings. Follow up in a month to discuss.    Spinal stenosis of lumbar region with neurogenic claudication  -     HYDROmorphone (DILAUDID) 4 MG tablet; Take 1 tablet (4 mg) by mouth At Bedtime  Renewal is given.      Patient Instructions   Omron is a reliable home blood pressure cuff.      The following health maintenance items are reviewed in Epic and correct as of today:  Health Maintenance   Topic Date Due     ZOSTER IMMUNIZATION (1 of 2) 11/20/2003     ADVANCE CARE PLANNING  03/25/2018     MEDICARE ANNUAL WELLNESS VISIT  11/20/2018     LIPID  07/18/2019     DTAP/TDAP/TD IMMUNIZATION (2 - Td) 08/03/2019     INFLUENZA VACCINE (1) 09/01/2019     TIFFANIE ASSESSMENT  03/28/2020     FALL RISK ASSESSMENT  03/28/2020     PHQ-9  03/28/2020     PNEUMOCOCCAL IMMUNIZATION 65+ LOW/MEDIUM RISK (2 of 2 - PPSV23) 10/26/2020     COLONOSCOPY  07/12/2028     HEPATITIS C SCREENING  Completed     HIV SCREENING  Completed     AORTIC ANEURYSM SCREENING (SYSTEM ASSIGNED)  Completed     IPV IMMUNIZATION  Aged Out     MENINGITIS IMMUNIZATION  Aged Out       Cassie Lombardo MD  [facility]  Family Practice  [facility]  [phone]    For any issues my office # is [phone]

## 2019-10-01 ENCOUNTER — HEALTH MAINTENANCE LETTER (OUTPATIENT)
Age: 64
End: 2019-10-01

## 2019-10-10 ENCOUNTER — OFFICE VISIT (OUTPATIENT)
Dept: FAMILY MEDICINE | Facility: CLINIC | Age: 64
End: 2019-10-10

## 2019-10-10 VITALS
RESPIRATION RATE: 16 BRPM | HEART RATE: 69 BPM | BODY MASS INDEX: 27.98 KG/M2 | OXYGEN SATURATION: 97 % | DIASTOLIC BLOOD PRESSURE: 80 MMHG | WEIGHT: 184 LBS | SYSTOLIC BLOOD PRESSURE: 154 MMHG

## 2019-10-10 DIAGNOSIS — M48.062 SPINAL STENOSIS OF LUMBAR REGION WITH NEUROGENIC CLAUDICATION: ICD-10-CM

## 2019-10-10 DIAGNOSIS — I35.8 NONRHEUMATIC AORTIC SCLEROSIS: Primary | ICD-10-CM

## 2019-10-10 DIAGNOSIS — I10 BENIGN ESSENTIAL HYPERTENSION: ICD-10-CM

## 2019-10-10 PROCEDURE — 99213 OFFICE O/P EST LOW 20 MIN: CPT | Performed by: FAMILY MEDICINE

## 2019-10-10 RX ORDER — IRBESARTAN 75 MG/1
75 TABLET ORAL AT BEDTIME
Qty: 90 TABLET | Refills: 1 | Status: SHIPPED | OUTPATIENT
Start: 2019-10-10 | End: 2019-12-19

## 2019-10-10 RX ORDER — HYDROMORPHONE HYDROCHLORIDE 4 MG/1
4 TABLET ORAL AT BEDTIME
Qty: 30 TABLET | Refills: 0 | Status: SHIPPED | OUTPATIENT
Start: 2019-10-11 | End: 2019-11-07

## 2019-10-10 NOTE — PROGRESS NOTES
Problem(s) Oriented visit        SUBJECTIVE:                                                    Mane Chawla is a 63 year old male who presents to clinic today for blood pressure check. He has home blood pressure readings of 180s/80s typically. No chest pain or pressure with exertion. He is still having terrible pain and is finally approaching approval for the battery that goes with an implantable high frequency stimulator.       Problem list, Medication list, Allergies, and Medical/Social/Surgical histories reviewed in EPIC and updated as appropriate.   Additional history: as documented    ROS:  5 point ROS completed and negative except noted above, including Gen, CV, Resp, GI, MS      Histories:   Patient Active Problem List   Diagnosis     Psoriatic arthritis (H)     Hyperlipidemia LDL goal <160     ACP (advance care planning)     Health Care Home     Midline low back pain without sciatica     Benign essential hypertension     Encounter for long-term (current) use of medications     Nonrheumatic aortic sclerosis (H)     Spinal stenosis of lumbar region with neurogenic claudication     Past Surgical History:   Procedure Laterality Date     ARTHROSCOPY KNEE RT/LT       CARPAL TUNNEL RELEASE RT/LT       FUSION CERVICAL ANTERIOR THREE+ LEVELS       FUSION LUMBAR ANTERIOR ONE LEVEL       ROTATOR CUFF REPAIR RT/LT         Social History     Tobacco Use     Smoking status: Former Smoker     Packs/day: 1.00     Years: 30.00     Pack years: 30.00     Types: Cigarettes     Last attempt to quit: 3/21/1996     Years since quittin.5     Smokeless tobacco: Never Used   Substance Use Topics     Alcohol use: Yes     Alcohol/week: 8.3 standard drinks     Types: 10 drink(s) per week     Family History   Problem Relation Age of Onset     Respiratory Mother      Cerebrovascular Disease Father      Alzheimer Disease Father            OBJECTIVE:                                                    BP (!) 154/80   Pulse 69    Resp 16   Wt 83.5 kg (184 lb)   SpO2 97%   BMI 27.98 kg/m    Body mass index is 27.98 kg/m .   GENERAL APPEARANCE: Alert, no acute distress  NECK: No adenopathy,masses or thyromegaly  RESP: lungs clear to auscultation   CV: normal rate, regular rhythm, 2/6 murmur at the upper sternal border  MS: extremities normal, no peripheral edema  NEURO: Alert, oriented, speech and mentation normal  PSYCH: mentation appears normal, affect and mood normal   Labs Resulted Today:   Results for orders placed or performed in visit on 08/13/19   Sed Rate Westergren ([facility])   Result Value Ref Range    Sed Rate 14 0 - 15 mm/hr   C-Reactive Protein  Quant ([facility])   Result Value Ref Range    C-Reactive Protein 5 0 - 10 mg/L    Narrative    Performed at:  03 - [facility] [address], [place], [place]  [id number]  : Ahmet Craig MD, Phone:  [phone]   CBC with Diff/Plt ([facility])   Result Value Ref Range    WBC x10/cmm 9.2 3.8 - 11.0 x10/cmm    % Lymphocytes 21.2 20.5 - 51.1 %    % Monocytes 6.0 1.7 - 9.3 %    % Granulocytes 72.8 42.2 - 75.2 %    RBC x10/cmm 4.50 4.2 - 5.9 x10/cmm    Hemoglobin 13.7 13.4 - 17.5 g/dl    Hematocrit 40.5 39 - 51 %    MCV 89.8 80 - 100 fL    MCH 30.6 27.0 - 31.0 pg    MCHC 34.0 33.0 - 37.0 g/dL    Platelet Count 292 140 - 450 K/uL   Parvovirus B19  Human  IgG/IgM ([facility])   Result Value Ref Range    Parvovirus B19, IgG 6.3 (H) 0.0 - 0.8 index    Parvovirus B19, IgM 0.2 0.0 - 0.8 index    Narrative    Performed at:  01 - [address]  [id number]  : Lidia Scott MD, Phone:  [phone]   Lyme  Total Ab Test/Reflex ([facility])   Result Value Ref Range    Lyme IgG/IgM Ab <0.91 0.00 - 0.90 ISR    Narrative    Performed at:  02 - [facility]  [address], [place], [place]  [id number]  : MERVAT Sebastian MD, Phone:  [phone]     ASSESSMENT/PLAN:                                                        Mane was seen today for  hypertension.    Diagnoses and all orders for this visit:    Nonrheumatic aortic sclerosis (H)    Spinal stenosis of lumbar region with neurogenic claudication  -     HYDROmorphone (DILAUDID) 4 MG tablet; Take 1 tablet (4 mg) by mouth At Bedtime    Benign essential hypertension  -     irbesartan (AVAPRO) 75 MG tablet; Take 1 tablet (75 mg) by mouth At Bedtime  Continue amlodipine and HCTZ and add irbesartan, recheck BP in one month.      There are no Patient Instructions on file for this visit.    The following health maintenance items are reviewed in Epic and correct as of today:  Health Maintenance   Topic Date Due     ZOSTER IMMUNIZATION (1 of 2) 11/20/2003     ADVANCE CARE PLANNING  03/25/2018     MEDICARE ANNUAL WELLNESS VISIT  11/20/2018     LIPID  07/18/2019     DTAP/TDAP/TD IMMUNIZATION (2 - Td) 08/03/2019     INFLUENZA VACCINE (1) 09/01/2019     TIFFANIE ASSESSMENT  03/28/2020     FALL RISK ASSESSMENT  03/28/2020     PHQ-9  03/28/2020     PNEUMOCOCCAL IMMUNIZATION 65+ LOW/MEDIUM RISK (2 of 2 - PPSV23) 10/26/2020     COLONOSCOPY  07/12/2028     HEPATITIS C SCREENING  Completed     HIV SCREENING  Completed     AORTIC ANEURYSM SCREENING (SYSTEM ASSIGNED)  Completed     IPV IMMUNIZATION  Aged Out     MENINGITIS IMMUNIZATION  Aged Out       Cassie Lombardo MD  [facility]  Family Practice  [facility]  [phone]    For any issues my office # is [phone]

## 2019-11-07 ENCOUNTER — OFFICE VISIT (OUTPATIENT)
Dept: FAMILY MEDICINE | Facility: CLINIC | Age: 64
End: 2019-11-07

## 2019-11-07 VITALS
OXYGEN SATURATION: 98 % | DIASTOLIC BLOOD PRESSURE: 82 MMHG | RESPIRATION RATE: 17 BRPM | WEIGHT: 184 LBS | BODY MASS INDEX: 27.98 KG/M2 | SYSTOLIC BLOOD PRESSURE: 146 MMHG | HEART RATE: 80 BPM | TEMPERATURE: 98.1 F

## 2019-11-07 DIAGNOSIS — Z79.1 ENCOUNTER FOR MONITORING CHRONIC NSAID THERAPY: ICD-10-CM

## 2019-11-07 DIAGNOSIS — Z51.81 ENCOUNTER FOR MONITORING CHRONIC NSAID THERAPY: ICD-10-CM

## 2019-11-07 DIAGNOSIS — N48.1 BALANITIS: ICD-10-CM

## 2019-11-07 DIAGNOSIS — M48.062 SPINAL STENOSIS OF LUMBAR REGION WITH NEUROGENIC CLAUDICATION: ICD-10-CM

## 2019-11-07 DIAGNOSIS — M79.10 MYALGIA: Primary | ICD-10-CM

## 2019-11-07 LAB
% GRANULOCYTES: 71.7 % (ref 42.2–75.2)
BACTERIA URINE: 0
BILIRUB UR QL STRIP: 0
BLOOD URINE DIP: ABNORMAL
CASTS/LPF: 0
COLOR UR: YELLOW
CRYSTAL URINE: 0
EPITHELIAL CELLS - QUEST: 0
ERYTHROCYTE [SEDIMENTATION RATE] IN BLOOD: 30 MM/HR (ref 0–15)
GLUCOSE UR STRIP-MCNC: 0 MG/DL
HCT VFR BLD AUTO: 42.2 % (ref 39–51)
HEMOGLOBIN: 14 G/DL (ref 13.4–17.5)
KETONES UR QL STRIP: 0
LEUKOCYTE ESTERASE URINE DIP: 0
LYMPHOCYTES NFR BLD AUTO: 21.5 % (ref 20.5–51.1)
MCH RBC QN AUTO: 30.5 PG (ref 27–31)
MCHC RBC AUTO-ENTMCNC: 33.2 G/DL (ref 33–37)
MCV RBC AUTO: 91.7 FL (ref 80–100)
MONOCYTES NFR BLD AUTO: 6.8 % (ref 1.7–9.3)
MUCOUS URINE: 0
NITRITE UR QL STRIP: ABNORMAL
PH UR STRIP: 5.5 PH (ref 5–9)
PLATELET # BLD AUTO: 343 K/UL (ref 140–450)
PROT UR QL: 0 MG/DL (ref ?–0.01)
RBC # BLD AUTO: 4.61 X10/CMM (ref 4.2–5.9)
RBC URINE: ABNORMAL (ref 0–3)
SP GR UR STRIP: 1.01 (ref 1–1.02)
UROBILINOGEN UR QL STRIP: 0.2 EU/DL (ref 0.2–1)
WBC # BLD AUTO: 10.6 X10/CMM (ref 3.8–11)
WBC URINE: ABNORMAL (ref 0–3)

## 2019-11-07 PROCEDURE — 81003 URINALYSIS AUTO W/O SCOPE: CPT | Performed by: FAMILY MEDICINE

## 2019-11-07 PROCEDURE — 36415 COLL VENOUS BLD VENIPUNCTURE: CPT | Performed by: FAMILY MEDICINE

## 2019-11-07 PROCEDURE — 85025 COMPLETE CBC W/AUTO DIFF WBC: CPT | Performed by: FAMILY MEDICINE

## 2019-11-07 PROCEDURE — 85651 RBC SED RATE NONAUTOMATED: CPT | Performed by: FAMILY MEDICINE

## 2019-11-07 PROCEDURE — 99214 OFFICE O/P EST MOD 30 MIN: CPT | Performed by: FAMILY MEDICINE

## 2019-11-07 RX ORDER — HYDROMORPHONE HYDROCHLORIDE 4 MG/1
4 TABLET ORAL AT BEDTIME
Qty: 30 TABLET | Refills: 0 | Status: SHIPPED | OUTPATIENT
Start: 2019-11-10 | End: 2019-12-19

## 2019-11-07 RX ORDER — NYSTATIN AND TRIAMCINOLONE ACETONIDE 100000; 1 [USP'U]/G; MG/G
CREAM TOPICAL 2 TIMES DAILY PRN
Qty: 15 G | Refills: 0 | Status: SHIPPED | OUTPATIENT
Start: 2019-11-07 | End: 2020-03-20

## 2019-11-07 NOTE — PROGRESS NOTES
Problem(s) Oriented visit        SUBJECTIVE:                                                    Mane Chawla is a 63 year old male who presents to clinic today for complaint of major body aches. He feels like even his skin hurts. He wonders if it is due to the irbesartan which is the only new medication. He stopped it yesterday, but not improved yet. Urine has been darker. It feels like the flu except without any cough or fever.      Problem list, Medication list, Allergies, and Medical/Social/Surgical histories reviewed in EPIC and updated as appropriate.   Additional history: as documented    ROS:  5 point ROS completed and negative except noted above, including Gen, CV, Resp, GI, MS      Histories:   Patient Active Problem List   Diagnosis     Psoriatic arthritis (H)     Hyperlipidemia LDL goal <160     ACP (advance care planning)     Health Care Home     Midline low back pain without sciatica     Benign essential hypertension     Encounter for long-term (current) use of medications     Nonrheumatic aortic sclerosis (H)     Spinal stenosis of lumbar region with neurogenic claudication     Past Surgical History:   Procedure Laterality Date     ARTHROSCOPY KNEE RT/LT       CARPAL TUNNEL RELEASE RT/LT       FUSION CERVICAL ANTERIOR THREE+ LEVELS       FUSION LUMBAR ANTERIOR ONE LEVEL       ROTATOR CUFF REPAIR RT/LT         Social History     Tobacco Use     Smoking status: Former Smoker     Packs/day: 1.00     Years: 30.00     Pack years: 30.00     Types: Cigarettes     Last attempt to quit: 3/21/1996     Years since quittin.6     Smokeless tobacco: Never Used   Substance Use Topics     Alcohol use: Yes     Alcohol/week: 8.3 standard drinks     Types: 10 drink(s) per week     Family History   Problem Relation Age of Onset     Respiratory Mother      Cerebrovascular Disease Father      Alzheimer Disease Father            OBJECTIVE:                                                    BP (!) 146/82   Pulse 80    Temp 98.1  F (36.7  C) (Oral)   Resp 17   Wt 83.5 kg (184 lb)   SpO2 98%   BMI 27.98 kg/m    Body mass index is 27.98 kg/m .   GENERAL APPEARANCE: Alert, no acute distress  NECK: No adenopathy,masses or thyromegaly  RESP: lungs clear to auscultation   CV: normal rate, regular rhythm, no murmur or gallop  ABDOMEN: soft, no organomegaly, masses or tenderness  LYMPHATICS: No cervical, supraclavicular or inguinal adenopathy  MS: extremities normal, no peripheral edema  NEURO: Alert, oriented, speech and mentation normal  PSYCH: mentation appears normal, affect and mood normal   Labs Resulted Today: No results found for any visits on 11/07/19.  ASSESSMENT/PLAN:                                                        Mane was seen today for fatigue.    Diagnoses and all orders for this visit:    Myalgia, etiology uncertain  -     Comp. Metabolic Panel (14) ([facility])  -     Creatine Kinase Total Serum ([facility])  -     CBC with Diff/Plt ([facility])  -     C-Reactive Protein  Quant ([facility])  -     Sed Rate Westergren ([facility])  Evaluate for muscle breakdown, generalized inflammation. Hold on treatment until baseline lab results are available.    Balanitis  -     nystatin-triamcinolone (MYCOLOG II) 980792-6.1 UNIT/GM-% external cream; Apply topically 2 times daily as needed (skin irritation)    Encounter for monitoring chronic NSAID therapy  -     Creatine Kinase Total Serum ([facility])  -     CBC with Diff/Plt ([facility])    Spinal stenosis of lumbar region with neurogenic claudication  -     HYDROmorphone (DILAUDID) 4 MG tablet; Take 1 tablet (4 mg) by mouth At Bedtime        There are no Patient Instructions on file for this visit.    The following health maintenance items are reviewed in Epic and correct as of today:  Health Maintenance   Topic Date Due     ADVANCE CARE PLANNING  03/25/2018     MEDICARE ANNUAL WELLNESS VISIT  11/20/2018     LIPID  07/18/2019     DTAP/TDAP/TD IMMUNIZATION (2 - Td) 08/03/2019     ZOSTER IMMUNIZATION  (2 of 2) 12/17/2019     TIFFANIE ASSESSMENT  03/28/2020     FALL RISK ASSESSMENT  03/28/2020     PHQ-9  03/28/2020     PNEUMOCOCCAL IMMUNIZATION 65+ LOW/MEDIUM RISK (2 of 2 - PPSV23) 10/26/2020     COLONOSCOPY  07/12/2028     HEPATITIS C SCREENING  Completed     HIV SCREENING  Completed     INFLUENZA VACCINE  Completed     AORTIC ANEURYSM SCREENING (SYSTEM ASSIGNED)  Completed     IPV IMMUNIZATION  Aged Out     MENINGITIS IMMUNIZATION  Aged Out       Cassie Lombardo MD  [facility]  Family Practice  [facility]  [phone]    For any issues my office # is [phone]

## 2019-11-08 ENCOUNTER — TELEPHONE (OUTPATIENT)
Dept: FAMILY MEDICINE | Facility: CLINIC | Age: 64
End: 2019-11-08

## 2019-11-08 DIAGNOSIS — M79.10 MYALGIA: Primary | ICD-10-CM

## 2019-11-08 DIAGNOSIS — R70.0 ELEVATED SED RATE: ICD-10-CM

## 2019-11-08 LAB
ALBUMIN SERPL-MCNC: 4.7 G/DL (ref 3.6–4.8)
ALBUMIN/GLOB SERPL: 1.6 {RATIO} (ref 1.2–2.2)
ALP SERPL-CCNC: 95 IU/L (ref 39–117)
ALT SERPL-CCNC: 25 IU/L (ref 0–44)
AST SERPL-CCNC: 21 IU/L (ref 0–40)
BILIRUB SERPL-MCNC: 0.4 MG/DL (ref 0–1.2)
BUN SERPL-MCNC: 16 MG/DL (ref 8–27)
BUN/CREATININE RATIO: 22 (ref 10–24)
CALCIUM SERPL-MCNC: 9.4 MG/DL (ref 8.6–10.2)
CHLORIDE SERPLBLD-SCNC: 98 MMOL/L (ref 96–106)
CK SERPL-CCNC: 83 U/L (ref 24–204)
CREAT SERPL-MCNC: 0.72 MG/DL (ref 0.76–1.27)
CRP SERPL-MCNC: 13 MG/L (ref 0–10)
EGFR IF AFRICN AM: 113 ML/MIN/1.73
EGFR IF NONAFRICN AM: 98 ML/MIN/1.73
GLOBULIN, TOTAL: 2.9 G/DL (ref 1.5–4.5)
GLUCOSE SERPL-MCNC: 88 MG/DL (ref 65–99)
POTASSIUM SERPL-SCNC: 4.2 MMOL/L (ref 3.5–5.2)
PROT SERPL-MCNC: 7.6 G/DL (ref 6–8.5)
SODIUM SERPL-SCNC: 139 MMOL/L (ref 134–144)
TOTAL CO2: 24 MMOL/L (ref 20–29)

## 2019-11-08 RX ORDER — PREDNISONE 20 MG/1
TABLET ORAL
Qty: 9 TABLET | Refills: 0 | Status: SHIPPED | OUTPATIENT
Start: 2019-11-08 | End: 2019-12-19

## 2019-11-08 NOTE — TELEPHONE ENCOUNTER
----- Message from Cassie Lombardo MD sent at 11/8/2019  2:20 PM CST -----  Your sed rate and CRP (both are markers for inflammation) are mildly elevated. They are not high enough to indicate bad things such as vasculitis. Furthermore, your Creatine Kinase is normal indication pain is not due to muscle breakdown. Your kidney function and liver function are normal.   I think that you may improve significantly with a short course of prednisone. I would recommend prednisone 20 mg twice daily for 3 days, then once daily for 3 days.

## 2019-11-08 NOTE — TELEPHONE ENCOUNTER
Called patient and informed of Dr. Lombardo's result note and recommendations. Patient would like the prednisone. Rx sent to pharmacy. Patient to call if symptoms don't improve.  Elizabeth Morris RN

## 2019-11-18 ENCOUNTER — TELEPHONE (OUTPATIENT)
Dept: FAMILY MEDICINE | Facility: CLINIC | Age: 64
End: 2019-11-18

## 2019-11-18 DIAGNOSIS — R70.0 ELEVATED SED RATE: Primary | ICD-10-CM

## 2019-11-18 RX ORDER — PREDNISONE 10 MG/1
10 TABLET ORAL DAILY
Qty: 14 TABLET | Refills: 0 | Status: SHIPPED | OUTPATIENT
Start: 2019-11-18 | End: 2019-12-19

## 2019-11-18 NOTE — TELEPHONE ENCOUNTER
Patient called to report that myalgia symptoms resolved while on prednisone but quickly returned one prescription done. Per Dr. Lombardo patient is to take 10mg prednisone daily and f/u with rheumatology. Patient reports that he sees Dr Dudley Douglas at [facility]-he will call to schedule appointment. Becky Williamson

## 2019-12-15 ENCOUNTER — HEALTH MAINTENANCE LETTER (OUTPATIENT)
Age: 64
End: 2019-12-15

## 2019-12-19 ENCOUNTER — OFFICE VISIT (OUTPATIENT)
Dept: FAMILY MEDICINE | Facility: CLINIC | Age: 64
End: 2019-12-19

## 2019-12-19 VITALS
SYSTOLIC BLOOD PRESSURE: 128 MMHG | DIASTOLIC BLOOD PRESSURE: 68 MMHG | RESPIRATION RATE: 17 BRPM | HEART RATE: 79 BPM | BODY MASS INDEX: 28.13 KG/M2 | WEIGHT: 185 LBS | OXYGEN SATURATION: 97 %

## 2019-12-19 DIAGNOSIS — L40.50 PSORIATIC ARTHRITIS (H): Primary | ICD-10-CM

## 2019-12-19 DIAGNOSIS — M48.062 SPINAL STENOSIS OF LUMBAR REGION WITH NEUROGENIC CLAUDICATION: ICD-10-CM

## 2019-12-19 LAB — ERYTHROCYTE [SEDIMENTATION RATE] IN BLOOD: 25 MM/HR (ref 0–15)

## 2019-12-19 PROCEDURE — 99213 OFFICE O/P EST LOW 20 MIN: CPT | Performed by: FAMILY MEDICINE

## 2019-12-19 PROCEDURE — 36415 COLL VENOUS BLD VENIPUNCTURE: CPT | Performed by: FAMILY MEDICINE

## 2019-12-19 PROCEDURE — 85651 RBC SED RATE NONAUTOMATED: CPT | Performed by: FAMILY MEDICINE

## 2019-12-19 RX ORDER — HYDROMORPHONE HYDROCHLORIDE 4 MG/1
4 TABLET ORAL AT BEDTIME
Qty: 30 TABLET | Refills: 0 | Status: SHIPPED | OUTPATIENT
Start: 2019-12-19 | End: 2020-01-21

## 2019-12-19 NOTE — PROGRESS NOTES
Problem(s) Oriented visit        SUBJECTIVE:                                                    Mane Chawla is a 64 year old male who presents to clinic today for ongoing issues of fatigue. He took prednisone when he had whole body pain and this helped. It aches in both his joints and his muscles. He has psoriatic arthritis, previously controlled by Enbrel. He is now off due to cost. He sees Dr. Douglas for this. He apparently saw him a few weeks ago. No fevers but his body feels hot. No congestion or cough.       Problem list, Medication list, Allergies, and Medical/Social/Surgical histories reviewed in EPIC and updated as appropriate.   Additional history: as documented    ROS:  5 point ROS completed and negative except noted above, including Gen, CV, Resp, GI, MS      Histories:   Patient Active Problem List   Diagnosis     Psoriatic arthritis (H)     Hyperlipidemia LDL goal <160     ACP (advance care planning)     Health Care Home     Midline low back pain without sciatica     Benign essential hypertension     Encounter for long-term (current) use of medications     Nonrheumatic aortic sclerosis (H)     Spinal stenosis of lumbar region with neurogenic claudication     Past Surgical History:   Procedure Laterality Date     ARTHROSCOPY KNEE RT/LT       CARPAL TUNNEL RELEASE RT/LT       FUSION CERVICAL ANTERIOR THREE+ LEVELS       FUSION LUMBAR ANTERIOR ONE LEVEL       ROTATOR CUFF REPAIR RT/LT         Social History     Tobacco Use     Smoking status: Former Smoker     Packs/day: 1.00     Years: 30.00     Pack years: 30.00     Types: Cigarettes     Last attempt to quit: 3/21/1996     Years since quittin.7     Smokeless tobacco: Never Used   Substance Use Topics     Alcohol use: Yes     Alcohol/week: 8.3 standard drinks     Types: 10 drink(s) per week     Family History   Problem Relation Age of Onset     Respiratory Mother      Cerebrovascular Disease Father      Alzheimer Disease Father             OBJECTIVE:                                                    /68   Pulse 79   Resp 17   Wt 83.9 kg (185 lb)   SpO2 97%   BMI 28.13 kg/m    Body mass index is 28.13 kg/m .   GENERAL APPEARANCE: Alert, no acute distress  NEURO: Alert, oriented, speech and mentation normal  PSYCH: mentation appears normal, affect and mood normal   Labs Resulted Today:   Results for orders placed or performed in visit on 12/19/19   Sed Rate Westergren ([facility])     Status: Abnormal   Result Value Ref Range    Sed Rate 25 (A) 0 - 15 mm/hr     ASSESSMENT/PLAN:                                                        Mane was seen today for refill request and fatigue.    Diagnoses and all orders for this visit:    Psoriatic arthritis (H)  -     Sed Rate Westergren ([facility])  -     C-Reactive Protein  Quant ([facility])    Spinal stenosis of lumbar region with neurogenic claudication  -     HYDROmorphone (DILAUDID) 4 MG tablet; Take 1 tablet (4 mg) by mouth At Bedtime    Recheck inflammatory markers to see if there is still elevation. He needs to follow up with Dr. Douglas for treatment for his psoriatic arthritis.    There are no Patient Instructions on file for this visit.    The following health maintenance items are reviewed in Epic and correct as of today:  Health Maintenance   Topic Date Due     ADVANCE CARE PLANNING  03/25/2018     MEDICARE ANNUAL WELLNESS VISIT  11/20/2018     LIPID  07/18/2019     DTAP/TDAP/TD IMMUNIZATION (2 - Td) 08/03/2019     ZOSTER IMMUNIZATION (2 of 2) 12/17/2019     FALL RISK ASSESSMENT  03/28/2020     PNEUMOCOCCAL IMMUNIZATION 65+ LOW/MEDIUM RISK (2 of 2 - PPSV23) 10/26/2020     COLONOSCOPY  07/12/2028     HEPATITIS C SCREENING  Completed     PHQ-2  Completed     INFLUENZA VACCINE  Completed     AORTIC ANEURYSM SCREENING (SYSTEM ASSIGNED)  Completed     IPV IMMUNIZATION  Aged Out     MENINGITIS IMMUNIZATION  Aged Out       Cassie Lombardo MD  [facility]  [facility]  [phone]    For any issues my office # is [phone]

## 2019-12-20 LAB — CRP SERPL-MCNC: 7 MG/L (ref 0–10)

## 2019-12-31 ENCOUNTER — OFFICE VISIT (OUTPATIENT)
Dept: FAMILY MEDICINE | Facility: CLINIC | Age: 64
End: 2019-12-31

## 2019-12-31 VITALS
BODY MASS INDEX: 27.89 KG/M2 | SYSTOLIC BLOOD PRESSURE: 132 MMHG | DIASTOLIC BLOOD PRESSURE: 74 MMHG | RESPIRATION RATE: 16 BRPM | OXYGEN SATURATION: 97 % | HEIGHT: 68 IN | HEART RATE: 80 BPM | WEIGHT: 184 LBS

## 2019-12-31 DIAGNOSIS — E78.5 HYPERLIPIDEMIA LDL GOAL <160: Primary | ICD-10-CM

## 2019-12-31 DIAGNOSIS — M62.838 MUSCLE SPASM: ICD-10-CM

## 2019-12-31 DIAGNOSIS — B07.9 VIRAL WARTS, UNSPECIFIED TYPE: ICD-10-CM

## 2019-12-31 PROCEDURE — 99212 OFFICE O/P EST SF 10 MIN: CPT | Mod: 25 | Performed by: FAMILY MEDICINE

## 2019-12-31 PROCEDURE — 17110 DESTRUCTION B9 LES UP TO 14: CPT | Performed by: FAMILY MEDICINE

## 2019-12-31 RX ORDER — DIAZEPAM 10 MG
TABLET ORAL
Qty: 30 TABLET | Refills: 0 | Status: SHIPPED | OUTPATIENT
Start: 2019-12-31 | End: 2020-06-09

## 2019-12-31 ASSESSMENT — MIFFLIN-ST. JEOR: SCORE: 1589.12

## 2019-12-31 NOTE — PROGRESS NOTES
HM DUE:   - Shingrix and Td update at pharmacy (<64 yo)    *POLST Provided    Has 6 lesions that look like warts,  Criod.    Has back stress and spasms and needs valium refill.    Mane Chawla is a 64 year old male has had 6 wart(s) for unknown time and has not tried over-the counter anti-wart medications.  There is no history of infection or injury.  This is the patient's first treatment.    O: The patient appears today in no apparent distress.  Vitals as above.  Skin: A non-erythematous, raised papule with pinpoint hemmorhages measuring 3mm is seen on the knee, wrists and chests.  A few smaller satellite papules are also noted.    A: Common Wart(s).    P:  Each wart was frozen easily three times with liquid nitrogen.  A total of 6 warts are treated today.  The etiology of common warts were discussed.  Mane will continue to use the over-the-counter medications such as Compound W under occlusion on a nightly  basis.  Warm soapy water soaks and sanding also recommended.  The patient is to return every two weeks until all warts have

## 2019-12-31 NOTE — PATIENT INSTRUCTIONS
Wound Care Instructions for Liquid Nitrogen Treatment   The treatment area will appear white at first. Over the next several hours a fluid-filled blister may form. The blister can be very dark. If blister appears, it will remain blistered for about a week. Then a scab will form over the area.   Leave the blistered area uncovered as long as it remains closed. If the area breaks open, you may cover it with a clean band aid. Do not pull off the skin covering the blister.   If the blistered area becomes uncomfortably filled with fluid, you may release some of the fluid by puncturing the blister with a needle that has been cleansed with alcohol.   If the skin covering the blister comes off, clean the area daily with soap and water. Apply a small amount of Vaseline and then cover with a clean band aid. Change the band aid twice daily until the skin is completely healed.   Call us if.....   1. You have signs of infection such as thick yellow or pus-like drainage from the wound site or a fever over 100 degrees Fahrenheit.   2. You have any questions or are not sure how to take care of the wound.

## 2020-01-21 DIAGNOSIS — M48.062 SPINAL STENOSIS OF LUMBAR REGION WITH NEUROGENIC CLAUDICATION: ICD-10-CM

## 2020-01-21 RX ORDER — HYDROMORPHONE HYDROCHLORIDE 4 MG/1
4 TABLET ORAL AT BEDTIME
Qty: 30 TABLET | Refills: 0 | Status: SHIPPED | OUTPATIENT
Start: 2020-01-21 | End: 2020-02-24

## 2020-01-21 NOTE — TELEPHONE ENCOUNTER
Patient calls requesting refill on hydromorphone 4mg #30 si po q hs prn.   Last visit: 19 with Dr. Lombardo.  Controlled Substance Agreement: needed   Last filled #30 tabs 19. States has 3 tabs left.     HYDROmorphone HCl       Dispensed Days Supply Quantity Provider Pharmacy   HYDROMORPHONE 4MG TABLETS 2019 30 30 [facility]...   HYDROMORPHONE 4MG TABLETS 11/10/2019 30 30 each [facility]oro [facility]...   HYDROMORPHONE 4MG TABLETS 10/11/2019 30 30 each [facility]...   HYDROMORPHONE 4MG TABLETS 2019 30 30 each [facility] #...   HYDROMORPHONE 4MG TABLETS 2019 30 30 each [facility]...   HYDROMORPHONE 4MG TABLETS 2019 30 30 each exoro system [facility] #...   HYDROMORPHONE 4MG TABLETS 2019 30 30 each [facility] #...   HYDROMORPHONE 4MG TABLETS 2019 10 30 each exoro system [facility] #...   HYDROMORPHONE HCL 2MG TABLETS 2019 7 21 each [facility]...   HYDROMORPHONE HCL 2MG TABLETS 2019 7 21 each [facility]...        Plan: routed to Dr. Lombardo for review.  Elizabeth Morris RN

## 2020-02-24 DIAGNOSIS — M48.062 SPINAL STENOSIS OF LUMBAR REGION WITH NEUROGENIC CLAUDICATION: ICD-10-CM

## 2020-02-24 RX ORDER — HYDROMORPHONE HYDROCHLORIDE 4 MG/1
4 TABLET ORAL AT BEDTIME
Qty: 30 TABLET | Refills: 0 | Status: SHIPPED | OUTPATIENT
Start: 2020-02-24 | End: 2020-03-20

## 2020-02-24 NOTE — TELEPHONE ENCOUNTER
Patient calls requesting refill on his dilaudid 4mg q hs. Has only one tab left.   Last visit pertaining to this: 12/19/19 with Dr. Lombardo. Saw Dr. Magana for unrelated issue 12/31/19.  Future visit: none   Dx: psoriatic arthritis and lumbar spinal stenosis    HYDROmorphone HCl       Dispensed Days Supply Quantity Provider Pharmacy   HYDROMORPHONE 4MG TABLETS 01/22/2020 30 30 [facility]...   HYDROMORPHONE 4MG TABLETS 12/19/2019 30 30 [facility]   HYDROMORPHONE 4MG TABLETS 11/10/2019 30 30 each [facility]...   HYDROMORPHONE 4MG TABLETS 10/11/2019 30 30 each [facility]...   HYDROMORPHONE 4MG TABLETS 09/11/2019 30 30 each [facility] #...   HYDROMORPHONE 4MG TABLETS 08/02/2019 30 30 each [facility] #...          Plan: will route to Dr. Magana for review of refill request.   Patient would be due for 3 month med check with next refill.   Elizabeth Morris RN

## 2020-02-28 NOTE — TELEPHONE ENCOUNTER
02/28/20 1:03 PM  Called patient to make sure he knew Dr. Magana sent rx to pharmacy. Patient confirms he has picked it up.   Also informed he needs med check with Dr. Magana prior to his next refill. He should call to schedule this soon. Patient agrees.  Elizabeth Morris RN

## 2020-03-09 DIAGNOSIS — I10 BENIGN ESSENTIAL HYPERTENSION: ICD-10-CM

## 2020-03-09 RX ORDER — AMLODIPINE BESYLATE 10 MG/1
TABLET ORAL
Qty: 90 TABLET | Refills: 0 | Status: SHIPPED | OUTPATIENT
Start: 2020-03-09 | End: 2020-04-23

## 2020-03-20 ENCOUNTER — VIRTUAL VISIT (OUTPATIENT)
Dept: FAMILY MEDICINE | Facility: CLINIC | Age: 65
End: 2020-03-20

## 2020-03-20 VITALS — SYSTOLIC BLOOD PRESSURE: 164 MMHG | HEART RATE: 67 BPM | DIASTOLIC BLOOD PRESSURE: 88 MMHG

## 2020-03-20 DIAGNOSIS — M48.062 SPINAL STENOSIS OF LUMBAR REGION WITH NEUROGENIC CLAUDICATION: ICD-10-CM

## 2020-03-20 DIAGNOSIS — N48.1 BALANITIS: ICD-10-CM

## 2020-03-20 DIAGNOSIS — L40.50 PSORIATIC ARTHRITIS (H): ICD-10-CM

## 2020-03-20 DIAGNOSIS — I35.8 NONRHEUMATIC AORTIC SCLEROSIS: ICD-10-CM

## 2020-03-20 PROCEDURE — 99214 OFFICE O/P EST MOD 30 MIN: CPT | Mod: GT | Performed by: FAMILY MEDICINE

## 2020-03-20 RX ORDER — HYDROMORPHONE HYDROCHLORIDE 4 MG/1
4 TABLET ORAL AT BEDTIME
Qty: 30 TABLET | Refills: 0 | Status: SHIPPED | OUTPATIENT
Start: 2020-03-20 | End: 2020-04-23

## 2020-03-20 RX ORDER — NYSTATIN AND TRIAMCINOLONE ACETONIDE 100000; 1 [USP'U]/G; MG/G
CREAM TOPICAL 2 TIMES DAILY PRN
Qty: 15 G | Refills: 0 | Status: SHIPPED | OUTPATIENT
Start: 2020-03-20 | End: 2020-12-10

## 2020-03-20 RX ORDER — ETANERCEPT 50 MG/ML
SOLUTION SUBCUTANEOUS WEEKLY
COMMUNITY
Start: 2020-03-20 | End: 2021-07-14

## 2020-04-22 RX ORDER — TURMERIC ROOT EXTRACT 500 MG
TABLET ORAL DAILY
COMMUNITY

## 2020-04-23 ENCOUNTER — VIRTUAL VISIT (OUTPATIENT)
Dept: FAMILY MEDICINE | Facility: CLINIC | Age: 65
End: 2020-04-23

## 2020-04-23 VITALS — SYSTOLIC BLOOD PRESSURE: 161 MMHG | TEMPERATURE: 98.2 F | DIASTOLIC BLOOD PRESSURE: 88 MMHG | HEART RATE: 82 BPM

## 2020-04-23 DIAGNOSIS — I10 BENIGN ESSENTIAL HYPERTENSION: ICD-10-CM

## 2020-04-23 DIAGNOSIS — M48.062 SPINAL STENOSIS OF LUMBAR REGION WITH NEUROGENIC CLAUDICATION: ICD-10-CM

## 2020-04-23 PROCEDURE — 99214 OFFICE O/P EST MOD 30 MIN: CPT | Mod: GT | Performed by: FAMILY MEDICINE

## 2020-04-23 RX ORDER — AMLODIPINE BESYLATE 10 MG/1
20 TABLET ORAL DAILY
Qty: 90 TABLET | Refills: 0 | Status: SHIPPED | OUTPATIENT
Start: 2020-04-23 | End: 2020-04-23

## 2020-04-23 RX ORDER — HYDROMORPHONE HYDROCHLORIDE 4 MG/1
4 TABLET ORAL AT BEDTIME
Qty: 30 TABLET | Refills: 0 | Status: SHIPPED | OUTPATIENT
Start: 2020-04-23 | End: 2020-05-26

## 2020-04-23 RX ORDER — AMLODIPINE BESYLATE 10 MG/1
TABLET ORAL
Qty: 180 TABLET | Refills: 1 | Status: SHIPPED | OUTPATIENT
Start: 2020-04-23 | End: 2020-06-25

## 2020-04-23 NOTE — PROGRESS NOTES
"    Problem(s) Oriented visit      Mane Chawla is being evaluated via a billable video visit.      The patient has been notified of following:     \"This video visit will be conducted via a call between you and your physician/provider. We have found that certain health care needs can be provided without the need for an in-person physical exam.  This service lets us provide the care you need with a video conversation.  If a prescription is necessary we can send it directly to your pharmacy.  If lab work is needed we can place an order for that and you can then stop by our lab to have the test done at a later time.    If during the course of the call the physician/provider feels a video visit is not appropriate, you will not be charged for this service.\"     Physician has received verbal consent for a Video Visit from the patient? Yes    SUBJECTIVE:                                                    Subjective     Mane Chawla is a 64 year old male who presents to clinic today for the following health issues:    HPI   Patient informed of observation status based on provider's order.  Observation brochure was given and the video watched. Patient/Family stated understanding. Questions answered.  Filemon Magana MD    Where in your body do you have pain? Back and neck due to bulging discs  How has your pain affected your ability to work? Can work part time without limitations  Which of these pain treatments have you tried since your last clinic visit? Other: is due to get surgery soon but currently on hold due to wifes illness.  How well are you sleeping? Awakens 6 times a night  How has your mood been since your last visit? About the same  Have you had a significant life event? Housing Concerns  Other aggravating factors: psoriatic arthritis  Taking medication as directed? Yes    he has Hypertension which is currently not well controlled. he has been compliant with his medications and is here today to follow " up on the this issue and see if we can't work on better control of the blood pressure.    Reviewed last 6 BP readings in chart:  BP Readings from Last 6 Encounters:   20 (!) 161/88   20 (!) 164/88   19 132/74   19 128/68   19 (!) 146/82   10/10/19 (!) 154/80     he  has not experienced any significant side effects from current medications for hypertension.    NO active cardiac complaints or symptoms with exercise.      Histories:   Patient Active Problem List   Diagnosis     Psoriatic arthritis (H)     Hyperlipidemia LDL goal <160     ACP (advance care planning)     Health Care Home     Midline low back pain without sciatica     Benign essential hypertension     Encounter for long-term (current) use of medications     Nonrheumatic aortic sclerosis (H)     Spinal stenosis of lumbar region with neurogenic claudication     Past Surgical History:   Procedure Laterality Date     ARTHROSCOPY KNEE RT/LT       CARPAL TUNNEL RELEASE RT/LT       FUSION CERVICAL ANTERIOR THREE+ LEVELS       FUSION LUMBAR ANTERIOR ONE LEVEL       ROTATOR CUFF REPAIR RT/LT         Social History     Tobacco Use     Smoking status: Former Smoker     Packs/day: 1.00     Years: 30.00     Pack years: 30.00     Types: Cigarettes     Last attempt to quit: 3/21/1996     Years since quittin.1     Smokeless tobacco: Never Used   Substance Use Topics     Alcohol use: Yes     Alcohol/week: 8.3 standard drinks     Types: 10 drink(s) per week     Family History   Problem Relation Age of Onset     Respiratory Mother      Cerebrovascular Disease Father      Alzheimer Disease Father            Reviewed and updated as needed this visit by Provider         ROS:  General and CV completed and negative except as noted above          OBJECTIVE:                                                      Objective    BP (!) 161/88   Pulse 82   Temp 98.2  F (36.8  C)   Estimated body mass index is 27.98 kg/m  as calculated from the  "following:    Height as of 12/31/19: 1.727 m (5' 8\").    Weight as of 12/31/19: 83.5 kg (184 lb).    Physical Exam   APPEARANCE: = Relaxed and in no distress  Breath Sounds = Good air movement with no rales or rhonchi in any lung fields  Recent/Remote Memory = Alert and Oriented x 3  Mood/Affect = Cooperative and interested            ASSESSMENT/PLAN:                                                        There are no Patient Instructions on file for this visit.  Mane was seen today for recheck and recheck medication.    Diagnoses and all orders for this visit:    Benign essential hypertension  -     amLODIPine (NORVASC) 10 MG tablet; Take 2 tablets (20 mg) by mouth daily    Spinal stenosis of lumbar region with neurogenic claudication  -     HYDROmorphone (DILAUDID) 4 MG tablet; Take 1 tablet (4 mg) by mouth At Bedtime        Regular exercise    Video-Visit Details    Type of service:  Video Visit  Originating Location (pt. Location): Home  Distant Location (provider location):  [facility]   Mode of Communication:  Video Conference via [employer]      The following health maintenance items are reviewed in Epic and correct as of today:  Health Maintenance   Topic Date Due     ADVANCE CARE PLANNING  03/25/2018     MEDICARE ANNUAL WELLNESS VISIT  11/20/2018     LIPID  07/18/2019     DTAP/TDAP/TD IMMUNIZATION (2 - Td) 08/03/2019     PHQ-2  01/01/2020     PNEUMOCOCCAL IMMUNIZATION 65+ LOW/MEDIUM RISK (2 of 2 - PPSV23) 10/26/2020     FALL RISK ASSESSMENT  03/20/2021     COLORECTAL CANCER SCREENING  07/12/2028     HEPATITIS C SCREENING  Completed     INFLUENZA VACCINE  Completed     ZOSTER IMMUNIZATION  Completed     AORTIC ANEURYSM SCREENING (SYSTEM ASSIGNED)  Completed     IPV IMMUNIZATION  Aged Out     MENINGITIS IMMUNIZATION  Aged Out       Filemon Magana MD  [facility]  Family Practice  [facility]  [phone]    For any issues my office # is [phone]          "

## 2020-05-26 ENCOUNTER — VIRTUAL VISIT (OUTPATIENT)
Dept: FAMILY MEDICINE | Facility: CLINIC | Age: 65
End: 2020-05-26

## 2020-05-26 VITALS — SYSTOLIC BLOOD PRESSURE: 149 MMHG | DIASTOLIC BLOOD PRESSURE: 79 MMHG

## 2020-05-26 DIAGNOSIS — M48.062 SPINAL STENOSIS OF LUMBAR REGION WITH NEUROGENIC CLAUDICATION: ICD-10-CM

## 2020-05-26 PROCEDURE — 99214 OFFICE O/P EST MOD 30 MIN: CPT | Mod: GT | Performed by: FAMILY MEDICINE

## 2020-05-26 RX ORDER — HYDROMORPHONE HYDROCHLORIDE 4 MG/1
4 TABLET ORAL AT BEDTIME
Qty: 30 TABLET | Refills: 0 | Status: SHIPPED | OUTPATIENT
Start: 2020-05-26 | End: 2020-06-25

## 2020-05-26 NOTE — PROGRESS NOTES
"  Problem(s) Oriented visit      Mane Chawla is being evaluated via a billable video visit.      The patient has been notified of following:     \"This video visit will be conducted via a call between you and your physician/provider. We have found that certain health care needs can be provided without the need for an in-person physical exam.  This service lets us provide the care you need with a video conversation.  If a prescription is necessary we can send it directly to your pharmacy.  If lab work is needed we can place an order for that and you can then stop by our lab to have the test done at a later time.    If during the course of the call the physician/provider feels a video visit is not appropriate, you will not be charged for this service.\"     Physician has received verbal consent for a Video Visit from the patient? Yes  1:15 PM  SUBJECTIVE:                                                    Subjective     Mane Chawla is a 64 year old male who presents to clinic today for the following health issues:    HPI     1. Spinal stenosis of lumbar region with neurogenic claudication  Chronic/Recurring Back Pain Follow Up    Where is your back pain located? (Select all that apply) low back bilateral  How would you describe your back pain?  sharp and shooting  Where does your back pain spread? the right and left  thigh  Since your last clinic visit for back pain, how has your pain changed? unchanged  Does your back pain interfere with your job? No  Since your last visit, have you tried any new treatment? Yes -  nsaids  Have you had any exacerbations with this pain .No    How many days per week do you miss taking your medication? 0    Smoking:  no  Passive smoke exposure:  no  Alcohol:  no  Drugs:  no    How many days per week do you exercise enough to make your heart beat faster? 5  How many minutes a day do you exercise enough to make your heart beat faster? 20 - 29      Histories:   Patient Active Problem " "List   Diagnosis     Psoriatic arthritis (H)     Hyperlipidemia LDL goal <160     ACP (advance care planning)     Health Care Home     Midline low back pain without sciatica     Benign essential hypertension     Encounter for long-term (current) use of medications     Nonrheumatic aortic sclerosis (H)     Spinal stenosis of lumbar region with neurogenic claudication     Past Surgical History:   Procedure Laterality Date     ARTHROSCOPY KNEE RT/LT       CARPAL TUNNEL RELEASE RT/LT       FUSION CERVICAL ANTERIOR THREE+ LEVELS       FUSION LUMBAR ANTERIOR ONE LEVEL       ROTATOR CUFF REPAIR RT/LT         Social History     Tobacco Use     Smoking status: Former Smoker     Packs/day: 1.00     Years: 30.00     Pack years: 30.00     Types: Cigarettes     Last attempt to quit: 3/21/1996     Years since quittin.1     Smokeless tobacco: Never Used   Substance Use Topics     Alcohol use: Yes     Alcohol/week: 8.3 standard drinks     Types: 10 drink(s) per week     Family History   Problem Relation Age of Onset     Respiratory Mother      Cerebrovascular Disease Father      Alzheimer Disease Father            Reviewed and updated as needed this visit by Provider         ROS:  General and Resp. completed and negative except as noted above          OBJECTIVE:                                                      Objective    There were no vitals taken for this visit.  Estimated body mass index is 27.98 kg/m  as calculated from the following:    Height as of 19: 1.727 m (5' 8\").    Weight as of 19: 83.5 kg (184 lb).    Physical Exam   APPEARANCE: = Relaxed and in no distress            ASSESSMENT/PLAN:                                                        There are no Patient Instructions on file for this visit.  Mane was seen today for refill request.    Diagnoses and all orders for this visit:    Spinal stenosis of lumbar region with neurogenic claudication        Regular exercise    Video-Visit " Details    Type of service:  Video Visit  Originating Location (pt. Location): Home  Distant Location (provider location):  [facility]   Mode of Communication:  Video Conference via Apple Facetime      The following health maintenance items are reviewed in Epic and correct as of today:  Health Maintenance   Topic Date Due     ADVANCE CARE PLANNING  03/25/2018     MEDICARE ANNUAL WELLNESS VISIT  11/20/2018     LIPID  07/18/2019     DTAP/TDAP/TD IMMUNIZATION (2 - Td) 08/03/2019     PHQ-2  01/01/2020     PNEUMOCOCCAL IMMUNIZATION 65+ LOW/MEDIUM RISK (2 of 2 - PPSV23) 10/26/2020     FALL RISK ASSESSMENT  03/20/2021     COLORECTAL CANCER SCREENING  07/12/2028     HEPATITIS C SCREENING  Completed     INFLUENZA VACCINE  Completed     ZOSTER IMMUNIZATION  Completed     AORTIC ANEURYSM SCREENING (SYSTEM ASSIGNED)  Completed     IPV IMMUNIZATION  Aged Out     MENINGITIS IMMUNIZATION  Aged Out     1:34 pm  Filemon Magana MD  [facility]  Family Practice  [facility]  [phone]    For any issues my office # is [phone]

## 2020-06-08 DIAGNOSIS — M62.838 MUSCLE SPASM: ICD-10-CM

## 2020-06-09 RX ORDER — DIAZEPAM 10 MG
TABLET ORAL
Qty: 30 TABLET | Refills: 0 | Status: SHIPPED | OUTPATIENT
Start: 2020-06-09 | End: 2020-10-29

## 2020-06-25 ENCOUNTER — OFFICE VISIT (OUTPATIENT)
Dept: FAMILY MEDICINE | Facility: CLINIC | Age: 65
End: 2020-06-25

## 2020-06-25 VITALS
SYSTOLIC BLOOD PRESSURE: 148 MMHG | RESPIRATION RATE: 16 BRPM | HEART RATE: 84 BPM | HEIGHT: 68 IN | OXYGEN SATURATION: 97 % | BODY MASS INDEX: 27.98 KG/M2 | DIASTOLIC BLOOD PRESSURE: 64 MMHG

## 2020-06-25 DIAGNOSIS — E78.5 HYPERLIPIDEMIA LDL GOAL <160: Primary | ICD-10-CM

## 2020-06-25 DIAGNOSIS — Z12.5 SCREENING FOR PROSTATE CANCER: ICD-10-CM

## 2020-06-25 DIAGNOSIS — I10 BENIGN ESSENTIAL HYPERTENSION: ICD-10-CM

## 2020-06-25 DIAGNOSIS — M48.062 SPINAL STENOSIS OF LUMBAR REGION WITH NEUROGENIC CLAUDICATION: ICD-10-CM

## 2020-06-25 DIAGNOSIS — R73.01 IFG (IMPAIRED FASTING GLUCOSE): ICD-10-CM

## 2020-06-25 DIAGNOSIS — L40.50 PSORIATIC ARTHRITIS (H): ICD-10-CM

## 2020-06-25 LAB
% GRANULOCYTES: 83.2 % (ref 42.2–75.2)
HCT VFR BLD AUTO: 38.6 % (ref 39–51)
HEMOGLOBIN: 13.4 G/DL (ref 13.4–17.5)
LYMPHOCYTES NFR BLD AUTO: 12.3 % (ref 20.5–51.1)
MCH RBC QN AUTO: 31.8 PG (ref 27–31)
MCHC RBC AUTO-ENTMCNC: 34.6 G/DL (ref 33–37)
MCV RBC AUTO: 91.8 FL (ref 80–100)
MONOCYTES NFR BLD AUTO: 4.5 % (ref 1.7–9.3)
PLATELET # BLD AUTO: 291 K/UL (ref 140–450)
RBC # BLD AUTO: 4.21 X10/CMM (ref 4.2–5.9)
WBC # BLD AUTO: 15 X10/CMM (ref 3.8–11)

## 2020-06-25 PROCEDURE — 85025 COMPLETE CBC W/AUTO DIFF WBC: CPT | Performed by: FAMILY MEDICINE

## 2020-06-25 PROCEDURE — 36415 COLL VENOUS BLD VENIPUNCTURE: CPT | Performed by: FAMILY MEDICINE

## 2020-06-25 PROCEDURE — 99214 OFFICE O/P EST MOD 30 MIN: CPT | Performed by: FAMILY MEDICINE

## 2020-06-25 RX ORDER — AMLODIPINE BESYLATE 10 MG/1
10 TABLET ORAL DAILY
Qty: 180 TABLET | Refills: 3
Start: 2020-06-25 | End: 2021-05-03

## 2020-06-25 RX ORDER — METHYLPREDNISOLONE 4 MG
1 TABLET, DOSE PACK ORAL DAILY
COMMUNITY
Start: 2020-06-22 | End: 2020-08-18

## 2020-06-25 RX ORDER — METOPROLOL SUCCINATE 25 MG/1
25 TABLET, EXTENDED RELEASE ORAL DAILY
Qty: 90 TABLET | Refills: 3 | Status: SHIPPED | OUTPATIENT
Start: 2020-06-25 | End: 2020-12-10

## 2020-06-25 RX ORDER — METHOCARBAMOL 500 MG/1
1 TABLET, FILM COATED ORAL 2 TIMES DAILY PRN
COMMUNITY
Start: 2020-04-16 | End: 2020-08-18

## 2020-06-25 RX ORDER — HYDROMORPHONE HYDROCHLORIDE 4 MG/1
4 TABLET ORAL AT BEDTIME
Qty: 30 TABLET | Refills: 0 | Status: SHIPPED | OUTPATIENT
Start: 2020-06-25 | End: 2020-08-18

## 2020-06-25 NOTE — PROGRESS NOTES
Problem(s) Oriented visit        SUBJECTIVE:                                                    Mane Chawla is a 64 year old male who presents to clinic today for the following health issues :      he has Hypertension which is currently not well controlled. he has been compliant with his medications and is here today to follow up on the this issue and see if we can't work on better control of the blood pressure.    Reviewed last 6 BP readings in chart:  BP Readings from Last 6 Encounters:   20 (!) 148/64   20 (!) 149/79   20 (!) 161/88   20 (!) 164/88   19 132/74   19 128/68     he  has not experienced any significant side effects from current medications for hypertension.    NO active cardiac complaints or symptoms with exercise.      Problem list, Medication list, Allergies, and Medical/Social/Surgical histories reviewed in EPIC and updated as appropriate.   Additional history: as documented    ROS:  General and CV completed and negative except as noted above    Histories:   Patient Active Problem List   Diagnosis     Psoriatic arthritis (H)     Hyperlipidemia LDL goal <160     ACP (advance care planning)     Health Care Home     Midline low back pain without sciatica     Benign essential hypertension     Encounter for long-term (current) use of medications     Nonrheumatic aortic sclerosis     Spinal stenosis of lumbar region with neurogenic claudication     Past Surgical History:   Procedure Laterality Date     ARTHROSCOPY KNEE RT/LT       CARPAL TUNNEL RELEASE RT/LT       FUSION CERVICAL ANTERIOR THREE+ LEVELS       FUSION LUMBAR ANTERIOR ONE LEVEL       ROTATOR CUFF REPAIR RT/LT         Social History     Tobacco Use     Smoking status: Former Smoker     Packs/day: 1.00     Years: 30.00     Pack years: 30.00     Types: Cigarettes     Last attempt to quit: 3/21/1996     Years since quittin.2     Smokeless tobacco: Never Used   Substance Use Topics     Alcohol use:  "Yes     Alcohol/week: 8.3 standard drinks     Types: 10 Standard drinks or equivalent per week     Family History   Problem Relation Age of Onset     Cerebrovascular Disease Father      Alzheimer Disease Father      Respiratory Mother            OBJECTIVE:                                                    BP (!) 148/64   Pulse 84   Resp 16   Ht 1.727 m (5' 8\")   SpO2 97%   BMI 27.98 kg/m    Body mass index is 27.98 kg/m .   APPEARANCE: = Relaxed and in no distress  Conj/Eyelids = noninjected and lids and lashes are without inflammation  PERRLA/Irises = Pupils Round Reactive to Light and Irisis without inflammation  Neck = No anterior or posterior adenopathy appreciated.  Thyroid = Not enlarged and no masses felt  Resp effort = Calm regular breathing  Breath Sounds = Good air movement with no rales or rhonchi in any lung fields  Heart Rate, Rhythm, & sounds (no Murm)  = Regular rate and rhythm with no S3, S4, or murmur appreciated.  Carotid Art's = Pulses full and equal and no bruits appreciated  Abdomen = Soft, nontender, no masses, & bowel sounds in all quadrants  Liver/Spleen = Normal span and no splenomegaly noted  Digits and Nails = FROM in all finger joints, no nail dystrophy  Ext (edema) = No pretibial edema noted or elsewhere  Musculsktl =  Strength and ROM of major joints are within normal limits  SKIN = absent significant rashes or lesions   Recent/Remote Memory = Alert and Oriented x 3  Mood/Affect = Cooperative and interested     ASSESSMENT/PLAN:                                                        Mane was seen today for recheck medication.    Diagnoses and all orders for this visit:    Hyperlipidemia LDL goal <160  Due for check eventually  Benign essential hypertension  Discussed current hypertension treatment guidelines, including indications for treatment and treatment options.  Discussed the importance for aggressive management of HTN to prevent vascular complications later.  Recommended " lower fat, lower carbohydrate, and lower sodium (<2000 mg)diet.  Discussed required intervals for follow up on HTN, lab studies.  Recommened pt. follow their blood pressures outside the clinic to ensure that BPs are remaining within guidelines, and to contact me if the readings are not within guidelines on a regular basis so we can adjust treatment as needed.    -     amLODIPine (NORVASC) 10 MG tablet; Take 1 tablet (10 mg) by mouth daily  -     metoprolol succinate ER (TOPROL-XL) 25 MG 24 hr tablet; Take 1 tablet (25 mg) by mouth daily  -     Comp. Metabolic Panel (14) ([facility])  -     CBC with Diff/Plt ([facility])    Psoriatic arthritis (H)    Spinal stenosis of lumbar region with neurogenic claudication  Refill Dilaudid #30  Screening for prostate cancer  -     PSA Serum ([facility])        Work on weight loss  Regular exercise  There are no Patient Instructions on file for this visit.    The following health maintenance items are reviewed in Epic and correct as of today:  Health Maintenance   Topic Date Due     ADVANCE CARE PLANNING  03/25/2018     MEDICARE ANNUAL WELLNESS VISIT  11/20/2018     LIPID  07/18/2019     DTAP/TDAP/TD IMMUNIZATION (2 - Td) 08/03/2019     PNEUMOCOCCAL IMMUNIZATION 65+ LOW/MEDIUM RISK (2 of 2 - PPSV23) 10/26/2020     FALL RISK ASSESSMENT  03/20/2021     COLORECTAL CANCER SCREENING  07/12/2028     HEPATITIS C SCREENING  Completed     PHQ-2  Completed     INFLUENZA VACCINE  Completed     ZOSTER IMMUNIZATION  Completed     AORTIC ANEURYSM SCREENING (SYSTEM ASSIGNED)  Completed     IPV IMMUNIZATION  Aged Out     MENINGITIS IMMUNIZATION  Aged Out       Filemon Magana MD  [facility]  Family Practice  [facility]  [phone]    For any issues my office # is [phone]

## 2020-06-26 LAB
ALBUMIN SERPL-MCNC: 4.7 G/DL (ref 3.8–4.8)
ALBUMIN/GLOB SERPL: 1.6 {RATIO} (ref 1.2–2.2)
ALP SERPL-CCNC: 63 IU/L (ref 39–117)
ALT SERPL-CCNC: 25 IU/L (ref 0–44)
AST SERPL-CCNC: 28 IU/L (ref 0–40)
BILIRUB SERPL-MCNC: 0.3 MG/DL (ref 0–1.2)
BUN SERPL-MCNC: 22 MG/DL (ref 8–27)
BUN/CREATININE RATIO: 29 (ref 10–24)
CALCIUM SERPL-MCNC: 9.8 MG/DL (ref 8.6–10.2)
CHLORIDE SERPLBLD-SCNC: 100 MMOL/L (ref 96–106)
CREAT SERPL-MCNC: 0.77 MG/DL (ref 0.76–1.27)
EGFR IF AFRICN AM: 109 ML/MIN/1.73
EGFR IF NONAFRICN AM: 95 ML/MIN/1.73
GLOBULIN, TOTAL: 2.9 G/DL (ref 1.5–4.5)
GLUCOSE SERPL-MCNC: 164 MG/DL (ref 65–99)
POTASSIUM SERPL-SCNC: 3.5 MMOL/L (ref 3.5–5.2)
PROT SERPL-MCNC: 7.6 G/DL (ref 6–8.5)
PSA NG/ML: 0.2 NG/ML (ref 0–4)
SODIUM SERPL-SCNC: 139 MMOL/L (ref 134–144)
TOTAL CO2: 24 MMOL/L (ref 20–29)

## 2020-06-30 LAB — HBA1C MFR BLD: 5 % (ref 4.8–5.6)

## 2020-07-02 NOTE — RESULT ENCOUNTER NOTE
Dear Mane,   I am writing to report that your included test results are within expected ranges. I do not suggest that we make any changes at this time.  Filemon Magana M.D.

## 2020-07-28 NOTE — PROGRESS NOTES
"Problem(s) Oriented visit        SUBJECTIVE:                                                    Mane Chawla is a 62 year old male who presents to clinic today for posterior headaches that came on last week. He recalls that when he was doing stretching exercises he heard a \"pop\" and since then he has had headache. It seems to radiate to the temples. No photophobia. He has very limited ROM. He works as a , and when he looks up it is worse. No radiation into the arms noted, but both of his shoulders are always in pain so it is hard to tell. In the past he has tried muscle relaxant but it makes him feel foggy. Last week the headache was rated 6-7/10. Now it is rated 2/10. He did ice a couple of times. He takes Enbrel for psoriatic arthritis.    He takes amlodipine for HTN. He admits that he forgets to take it about twice weekly.      Problem list, Medication list, Allergies, and Medical/Social/Surgical histories reviewed in EPIC and updated as appropriate.   Additional history: as documented    ROS:  5 point ROS completed and negative except noted above, including Gen, CV, Resp, GI, MS      Histories:   Patient Active Problem List   Diagnosis     Psoriatic arthritis (H)     Hyperlipidemia LDL goal <160     ACP (advance care planning)     Health Care Home     Midline low back pain without sciatica     Benign essential hypertension     Encounter for long-term (current) use of medications     Past Surgical History:   Procedure Laterality Date     ARTHROSCOPY KNEE RT/LT       CARPAL TUNNEL RELEASE RT/LT       FUSION CERVICAL ANTERIOR THREE+ LEVELS       FUSION LUMBAR ANTERIOR ONE LEVEL       ROTATOR CUFF REPAIR RT/LT         Social History   Substance Use Topics     Smoking status: Former Smoker     Packs/day: 1.00     Years: 30.00     Types: Cigarettes     Quit date: 3/21/1996     Smokeless tobacco: Never Used     Alcohol use 5.0 oz/week     10 drink(s) per week     Family History   Problem Relation Age of Onset "     Respiratory Mother      CEREBROVASCULAR DISEASE Father      Alzheimer Disease Father            OBJECTIVE:                                                    BP (!) 138/92  Pulse 78  Wt 87.5 kg (192 lb 12.8 oz)  BMI 30.2 kg/m2  Body mass index is 30.2 kg/(m^2).   GENERAL APPEARANCE: Alert, no acute distress  NECK: No adenopathy,masses or thyromegaly  RESP: lungs clear to auscultation   CV: normal rate, regular rhythm, no murmur or gallop  MS: extremities normal, no peripheral edema, ROM of the neck is diminished in all aspects. Tightness of the muscles of the neck and upper back.  NEURO: Alert, oriented, speech and mentation normal, CN 2-12 are intact.   PSYCH: mentation appears normal, affect and mood normal   Labs Resulted Today:   Results for orders placed or performed in visit on 11/08/17   CBC with Diff/Plt ([facility])   Result Value Ref Range    WBC x10/cmm 8.8 3.8 - 11.0 x10/cmm    % Lymphocytes 20.5 20.5 - 51.1 %    % Monocytes 7.1 1.7 - 9.3 %    % Granulocytes 72.4 42.2 - 75.2 %    RBC x10/cmm 4.47 4.2 - 5.9 x10/cmm    Hemoglobin 13.8 13.4 - 17.5 g/dl    Hematocrit 42.1 39 - 51 %    MCV 94.1 80 - 100 fL    MCH 31.0 27.0 - 31.0 pg    MCHC 32.9 (A) 33.0 - 37.0 g/dL    Platelet Count 201 140 - 450 K/uL     ASSESSMENT/PLAN:                                                        Mane was seen today for headache.    Diagnoses and all orders for this visit:    Strain of neck muscle, initial encounter, likely the cause of his headache  -     metaxalone (SKELAXIN) 800 MG tablet; 1/2 - 1 po every 6 hours prn muscle pain  Trial of Skelaxin in hopes of better tolerance.   Call if headache fails to resolve, involves his vision, or worsens again.    Benign essential hypertension, inadequate control.  -     hydrochlorothiazide 12.5 MG TABS tablet; Take 1 tablet (12.5 mg) by mouth daily  Continue amlodipine 10 mg daily, add HCTZ, compliance is stressed. Follow up in 4 weeks to recheck blood pressure.     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     - Recommend therapeutic lovenox for anticoagulation  - Care per primary team    Vascular Surgery (C Team Surgery)  [id number] with any questions Instructions   Recheck blood pressure in 4 weeks.      The following health maintenance items are reviewed in Epic and correct as of today:  Health Maintenance   Topic Date Due     HIV SCREEN (SYSTEM ASSIGNED)  11[birth year]20/1973     TIFFANIE QUESTIONNAIRE 1 YEAR  11/22/2017     PHQ-9 Q1YR  11/22/2017     COLON CANCER SCREEN (SYSTEM ASSIGNED)  12/12/2017     ADVANCE DIRECTIVE PLANNING Q5 YRS  03/25/2018     LIPID SCREEN Q5 YR MALE (SYSTEM ASSIGNED)  07/18/2019     TETANUS IMMUNIZATION (SYSTEM ASSIGNED)  08/03/2019     INFLUENZA VACCINE  Completed     HEPATITIS C SCREENING  Completed       Cassie Lombardo MD  [facility]  Family Practice  [facility]  [phone]    For any issues my office # is [phone]         48F w/ pmh of RA (on Rinvoq since 6/2020, has been on multiple other immunosuppressive drugs in the past) 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     - stable from vasc surg standpoint  Recommend therapeutic lovenox for anticoagulation  - Care per primary team  will follow

## 2020-08-18 ENCOUNTER — VIRTUAL VISIT (OUTPATIENT)
Dept: FAMILY MEDICINE | Facility: CLINIC | Age: 65
End: 2020-08-18

## 2020-08-18 VITALS — SYSTOLIC BLOOD PRESSURE: 143 MMHG | DIASTOLIC BLOOD PRESSURE: 80 MMHG

## 2020-08-18 DIAGNOSIS — M48.062 SPINAL STENOSIS OF LUMBAR REGION WITH NEUROGENIC CLAUDICATION: ICD-10-CM

## 2020-08-18 DIAGNOSIS — I10 BENIGN ESSENTIAL HYPERTENSION: ICD-10-CM

## 2020-08-18 RX ORDER — HYDROCHLOROTHIAZIDE 50 MG/1
50 TABLET ORAL DAILY
Qty: 90 TABLET | Refills: 3 | Status: SHIPPED | OUTPATIENT
Start: 2020-08-18 | End: 2021-08-26

## 2020-08-18 RX ORDER — HYDROMORPHONE HYDROCHLORIDE 4 MG/1
4 TABLET ORAL AT BEDTIME
Qty: 30 TABLET | Refills: 0 | Status: SHIPPED | OUTPATIENT
Start: 2020-08-18 | End: 2020-09-29

## 2020-08-18 NOTE — PROGRESS NOTES
"    Video Problem(s) Oriented visit      Video Start Time: 2:37 PM    The patient has been notified of following:     \"This video visit will be conducted via a call between you and your physician/provider. We have found that certain health care needs can be provided without the need for an in-person physical exam.  This service lets us provide the care you need with a video conversation.  If a prescription is necessary we can send it directly to your pharmacy.  If lab work is needed we can place an order for that and you can then stop by our lab to have the test done at a later time.    Video visits are billed at different rates depending on your insurance coverage.  Please reach out to your insurance provider with any questions.    If during the course of the call the physician/provider feels a video visit is not appropriate, you will not be charged for this service.\"    Patient has given verbal consent for Video visit? Yes    How would you like to obtain your AVS? MyChart    Will anyone else be joining your video visit? No  SUBJECTIVE:                                                    Mane Chawla is a 64 year old male who presents to clinic today for the following health issues :    Essential Hypertension   Remains well controlled when checked out of clinic.   he has not experienced any significant side effects from medications for hypertension.    NO active cardiac complaints or symptoms with exercise.  Current medications for treatment:  Diuretic thiazide (chlorthalidone, hydrochlorothiazide, indapamide, Beta Blockers (metoprolol, propranolol, atenolol and Calcium channel blocker    Reviewed last 6 BP readings in chart:  BP Readings from Last 6 Encounters:   08/18/20 (!) 143/80   06/25/20 (!) 148/64   05/26/20 (!) 149/79   04/23/20 (!) 161/88   03/20/20 (!) 164/88   12/31/19 132/74           1. Spinal stenosis of lumbar region with neurogenic claudication  Chronic Pain Follow-Up    Where in your body do you " have pain? back  How has your pain affected your ability to work? Pain does not limit ability to work  Which of these pain treatments have you tried since your last clinic visit? Heat and Massage  How well are you sleeping? Fair  How has your mood been since your last visit? Better  Have you had a significant life event? No  Other aggravating factors: none  Taking medication as directed? Yes    PHQ-9 SCORE 11/22/2016 6/1/2018 3/28/2019   PHQ-9 Total Score 3 2 5     TIFFANIE-7 SCORE 11/22/2016 6/1/2018 3/28/2019   Total Score 3 1 1     No flowsheet data found.  Encounter-Level CSA - 07/12/2018:    Controlled Substance Agreement - Scan on 7/12/2018  3:40 PM: CONTROLLED SUBSTANCE AGREEMENT 7/12/18     Encounter-Level CSA - 04/27/2017:    Controlled Substance Agreement - Scan on 4/27/2017  3:48 PM: CONTROLLED SUBSTANCE AGREEMENT 4/27/17     Patient-Level CSA:    There are no patient-level csa.       Have you had any exacerbations with this pain .No    How many days per week do you miss taking your medication? 0    Smoking:  no  Passive smoke exposure:  no  Alcohol:  no  Drugs:  no    How many days per week do you exercise enough to make your heart beat faster? 4  How many minutes a day do you exercise enough to make your heart beat faster? 30 - 60    - HYDROmorphone (DILAUDID) 4 MG tablet; Take 1 tablet (4 mg) by mouth At Bedtime  Dispense: 30 tablet; Refill: 0       Problem list, Medication list, Allergies, and Medical/Social/Surgical histories reviewed in EPIC and updated as appropriate.   Additional history: as documented    ROS:  General and CV completed and negative except as noted above    Histories:   Patient Active Problem List   Diagnosis     Psoriatic arthritis (H)     Hyperlipidemia LDL goal <160     ACP (advance care planning)     Health Care Home     Midline low back pain without sciatica     Benign essential hypertension     Encounter for long-term (current) use of medications     Nonrheumatic aortic sclerosis      Spinal stenosis of lumbar region with neurogenic claudication     Past Surgical History:   Procedure Laterality Date     ARTHROSCOPY KNEE RT/LT       CARPAL TUNNEL RELEASE RT/LT       FUSION CERVICAL ANTERIOR THREE+ LEVELS       FUSION LUMBAR ANTERIOR ONE LEVEL       ROTATOR CUFF REPAIR RT/LT         Social History     Tobacco Use     Smoking status: Former Smoker     Packs/day: 1.00     Years: 30.00     Pack years: 30.00     Types: Cigarettes     Last attempt to quit: 3/21/1996     Years since quittin.4     Smokeless tobacco: Never Used   Substance Use Topics     Alcohol use: Yes     Alcohol/week: 8.3 standard drinks     Types: 10 Standard drinks or equivalent per week     Family History   Problem Relation Age of Onset     Cerebrovascular Disease Father      Alzheimer Disease Father      Respiratory Mother            OBJECTIVE:                                                    BP (!) 143/80   There is no height or weight on file to calculate BMI.   APPEARANCE: = Relaxed and in no distress  Musculsktl =  Strength and ROM of major joints are within normal limits  Recent/Remote Memory = Alert and Oriented x 3  Mood/Affect = Cooperative and interested     ASSESSMENT/PLAN:                                                        Mane was seen today for refill request.    Diagnoses and all orders for this visit:    Spinal stenosis of lumbar region with neurogenic claudication  -     HYDROmorphone (DILAUDID) 4 MG tablet; Take 1 tablet (4 mg) by mouth At Bedtime    Benign essential hypertension  Discussed current hypertension treatment guidelines, including indications for treatment and treatment options.  Discussed the importance for aggressive management of HTN to prevent vascular complications later.  Recommended lower fat, lower carbohydrate, and lower sodium (<2000 mg)diet.  Discussed required intervals for follow up on HTN, lab studies.  Recommened pt. follow their blood pressures outside the clinic to  ensure that BPs are remaining within guidelines, and to contact me if the readings are not within guidelines on a regular basis so we can adjust treatment as needed.  Increase to:  -     hydrochlorothiazide (HYDRODIURIL) 50 MG tablet; Take 1 tablet (50 mg) by mouth daily        Work on weight loss  Regular exercise  There are no Patient Instructions on file for this visit.    The following health maintenance items are reviewed in Epic and correct as of today:  Health Maintenance   Topic Date Due     ADVANCE CARE PLANNING  03/25/2018     MEDICARE ANNUAL WELLNESS VISIT  11/20/2018     LIPID  07/18/2019     DTAP/TDAP/TD IMMUNIZATION (2 - Td) 08/03/2019     INFLUENZA VACCINE (1) 09/01/2020     PNEUMOCOCCAL IMMUNIZATION 65+ LOW/MEDIUM RISK (2 of 2 - PPSV23) 10/26/2020     FALL RISK ASSESSMENT  03/20/2021     COLORECTAL CANCER SCREENING  07/12/2028     HEPATITIS C SCREENING  Completed     PHQ-2  Completed     ZOSTER IMMUNIZATION  Completed     AORTIC ANEURYSM SCREENING (SYSTEM ASSIGNED)  Completed     IPV IMMUNIZATION  Aged Out     MENINGITIS IMMUNIZATION  Aged Out     HEPATITIS B IMMUNIZATION  Aged Out       Video-Visit Details    This visit is being conducted as a virtual visit due to the emphasis on mitigation of the COVID-19 virus pandemic.   Type of service:  Video Visit    Originating Location (pt. Location): Home    Distant Location (provider location):  [facility]     Platform used for Video Visit: Apple Facetime    Filemon Magana MD  [facility]  Family Practice  [facility]  [phone]    Video End Time:2:42 PM  For any issues my office # is [phone]

## 2020-09-29 ENCOUNTER — VIRTUAL VISIT (OUTPATIENT)
Dept: FAMILY MEDICINE | Facility: CLINIC | Age: 65
End: 2020-09-29

## 2020-09-29 VITALS — DIASTOLIC BLOOD PRESSURE: 40 MMHG | SYSTOLIC BLOOD PRESSURE: 138 MMHG

## 2020-09-29 DIAGNOSIS — M48.062 SPINAL STENOSIS OF LUMBAR REGION WITH NEUROGENIC CLAUDICATION: ICD-10-CM

## 2020-09-29 DIAGNOSIS — N52.9 ERECTILE DYSFUNCTION, UNSPECIFIED ERECTILE DYSFUNCTION TYPE: Primary | ICD-10-CM

## 2020-09-29 PROCEDURE — 99214 OFFICE O/P EST MOD 30 MIN: CPT | Mod: 95 | Performed by: FAMILY MEDICINE

## 2020-09-29 RX ORDER — TADALAFIL 5 MG/1
5 TABLET ORAL EVERY 24 HOURS
Qty: 15 TABLET | Refills: 3 | Status: SHIPPED | OUTPATIENT
Start: 2020-09-29 | End: 2021-06-29

## 2020-09-29 RX ORDER — HYDROMORPHONE HYDROCHLORIDE 4 MG/1
4 TABLET ORAL AT BEDTIME
Qty: 30 TABLET | Refills: 0 | Status: SHIPPED | OUTPATIENT
Start: 2020-09-29 | End: 2020-10-29

## 2020-09-29 NOTE — PROGRESS NOTES
"    Video Problem(s) Oriented visit      Video Start Time: 5:15 PM    The patient has been notified of following:     \"This video visit will be conducted via a call between you and your physician/provider. We have found that certain health care needs can be provided without the need for an in-person physical exam.  This service lets us provide the care you need with a video conversation.  If a prescription is necessary we can send it directly to your pharmacy.  If lab work is needed we can place an order for that and you can then stop by our lab to have the test done at a later time.    Video visits are billed at different rates depending on your insurance coverage.  Please reach out to your insurance provider with any questions.    If during the course of the call the physician/provider feels a video visit is not appropriate, you will not be charged for this service.\"    Patient has given verbal consent for Video visit? Yes    How would you like to obtain your AVS? MyChart    Will anyone else be joining your video visit? No  SUBJECTIVE:                                                    Mane Chawla is a 64 year old male who presents to clinic today for the following health issues :        1. Spinal stenosis of lumbar region with neurogenic claudication  Chronic Pain Follow-Up    Where in your body do you have pain? Lower back  How has your pain affected your ability to work? Pain does not limit ability to work  Which of these pain treatments have you tried since your last clinic visit? Stretching and Other: awaiting surgery  How well are you sleeping? Fair  How has your mood been since your last visit? About the same  Have you had a significant life event? No  Other aggravating factors: prolonged sitting  Taking medication as directed? Yes    PHQ-9 SCORE 11/22/2016 6/1/2018 3/28/2019   PHQ-9 Total Score 3 2 5     TIFFANIE-7 SCORE 11/22/2016 6/1/2018 3/28/2019   Total Score 3 1 1     No flowsheet data " found.  Encounter-Level CSA - 07/12/2018:    Controlled Substance Agreement - Scan on 7/12/2018  3:40 PM: CONTROLLED SUBSTANCE AGREEMENT 7/12/18     Encounter-Level CSA - 04/27/2017:    Controlled Substance Agreement - Scan on 4/27/2017  3:48 PM: CONTROLLED SUBSTANCE AGREEMENT 4/27/17     Patient-Level CSA:    There are no patient-level csa.       Have you had any exacerbations with this pain .Yes  Now driving 3 hours a day.  How many days per week do you miss taking your medication? 0    Smoking:  no  Passive smoke exposure:  no  Alcohol:  no  Drugs:  no    How many days per week do you exercise enough to make your heart beat faster? 6  How many minutes a day do you exercise enough to make your heart beat faster? 30 - 60    Pt. reports difficulty achieving and maintaining erections.  Denies other specific  complaints.  Has not tried VIAGRA or similar medication.  Is interested in trying VIAGRA.      Problem list, Medication list, Allergies, and Medical/Social/Surgical histories reviewed in EPIC and updated as appropriate.   Additional history: as documented    ROS:  General and Resp. completed and negative except as noted above    Histories:   Patient Active Problem List   Diagnosis     Psoriatic arthritis (H)     Hyperlipidemia LDL goal <160     ACP (advance care planning)     Health Care Home     Midline low back pain without sciatica     Benign essential hypertension     Encounter for long-term (current) use of medications     Nonrheumatic aortic sclerosis     Spinal stenosis of lumbar region with neurogenic claudication     Past Surgical History:   Procedure Laterality Date     ARTHROSCOPY KNEE RT/LT       CARPAL TUNNEL RELEASE RT/LT       FUSION CERVICAL ANTERIOR THREE+ LEVELS       FUSION LUMBAR ANTERIOR ONE LEVEL       ROTATOR CUFF REPAIR RT/LT         Social History     Tobacco Use     Smoking status: Former Smoker     Packs/day: 1.00     Years: 30.00     Pack years: 30.00     Types: Cigarettes     Last  attempt to quit: 3/21/1996     Years since quittin.5     Smokeless tobacco: Never Used   Substance Use Topics     Alcohol use: Yes     Alcohol/week: 8.3 standard drinks     Types: 10 Standard drinks or equivalent per week     Family History   Problem Relation Age of Onset     Cerebrovascular Disease Father      Alzheimer Disease Father      Respiratory Mother            OBJECTIVE:                                                    /40   There is no height or weight on file to calculate BMI.   APPEARANCE: = Relaxed and in no distress  Recent/Remote Memory = Alert and Oriented x 3  Mood/Affect = Cooperative and interested     ASSESSMENT/PLAN:                                                        Mane was seen today for recheck medication.    Diagnoses and all orders for this visit:    Erectile dysfunction, unspecified erectile dysfunction type  Discussed erectile dysfunction in detail including a review of the physiology that produces erections.  Reviewed typical causes of impotence such as medication side effect, diabetes, neuropathies, drugs/alcohol, underlying mood disorder, relationship issues, neurogenic causes, hypogonadism.  Discussed typical medical evaluation including physical exam findings, labs (thyroid, testosterone, BMP, etc.)  Treatment will include modifying any causative features if able, stopping nay substance use, treating low testosterone levels, and consideration of medications (Viagra/Levitra/Cialis).     Discussed the risks and benefits of these meds.  Discussed the potential side effects of Viagra/Levitra/Cialis including dizziness, headache, vision changes, syncope, GI upset/dyspepsia, hypotension.  Recommended avoiding taking with large meal to improve absorption.  Avoid taking with alcohol.  Never take nitroglycerin containing compounds when on these medications.    WIll start with a lower dose and titrate upward as needed, aiming for the lowest effective dose.  If effective,  "they will call for a prescription provided the medication if effective and there are no side effects.     -     tadalafil (CIALIS) 5 MG tablet; Take 1 tablet (5 mg) by mouth every 24 hours    Spinal stenosis of lumbar region with neurogenic claudication  Discussed side effects associated with narcotic pain meds including GI disturbances (such as diarrhea, nausea, vomiting, constipation, etc.), drowsiness, decreased cognition, narcotic withdrawal, and narcotic addiction/dependence.  Told the patient never to drink alcohol while taking this medication and not to drive or operate dangerous machinery while taking it.  Also discussed in specific detail my expectation regarding responsible use of narcotic and the implied \"contract\" with the patient that this type of medication will be used properly and exactly as instructed, and that any abnormal behaviors associated with the use of this medication will cause me to stop prescribing these medications at any time.      -     HYDROmorphone (DILAUDID) 4 MG tablet; Take 1 tablet (4 mg) by mouth At Bedtime        See Patient Instructions  Patient Instructions     Nic Mc     Video visits are a convenient way to see your provider without leaving the comfort of your home.  Video visits are completed securely through your mobile device or computer and are a safe and easy alternative for you to consult with your provider or care team.       For urgent access to virtual care, please visit our 24/7 online clinic at [email or web address]    Prepare your mobile device or computer (see instructions below).    You can invite a family member or friend to join your video visit. Direct them to this website for their video visit instructions: [email or web address]        Preparing for your video visit  There are a few short steps you must take to prepare for your video visit. If you would like to print out a detailed version of these instructions to refer to later, click here.     Video visit " tips:    Make sure your computer or mobile device is charged or plugged in to a power source.    Establish a secure internet connection with your computer or mobile device. Wired connection is better than wireless (Wi-Fi). Wired or wireless connection is better than cellular data for mobile devices.    Minimize other websites/applications running on your computer or mobile device while participating in the video visit.    Find a private space in a quiet, well-lit area without distractions. Take a seat, if possible (so you don t move).    Set the camera down at eye level, if possible (rather than holding it).    Dress as you would if you were being seen in the clinic.    Have your phone ready in case your care team needs to contact you for any reason.     If using a mobile device  You will need:    Mobile device with a camera, microphone and speaker.    [employer] therese (Go here for instructions on how to get the [employer] therese on your mobile device).    Secure Internet connection. Wireless (WiFi) connection is better than cellular data.      Instructions:  1. Open the [employer] therese on your mobile device.  2. If this is the first time using the therese, select Add Organization and search for  [facility] .     Select the [employer] and [employer] logo.  3. Log in to the [employer] therese.  4. Select Appointments, then locate your upcoming video visit.  5. Complete the eCheck-In process. Help for eCheck-In.     Note: If you are already checked in for your appointment, the eCheck-In button will not appear.   6. Select the Begin Visit button when it appears at the bottom of the screen. This will launch you to our Virtual Visit web page.  7. You will be prompted to complete a technology check and confirm your callback number. Follow the steps to make sure your mobile device has everything needed for your video visit.  8. Select the Start Your Visit button to enter the waiting room. Please wait on this screen until someone from your  care team calls you.      If using a computer   You will need:    Computer with a camera, microphone and speaker.     The latest version of [employer] internet browser (best), Edge or Firefox. We recommend setting Chrome as your default browser (click here for instructions).    Secure Internet connection. Wired connection is better than wireless (Wi-Fi).      Instructions:  1. Log in to the [employer] web page. Go here.  2. Select Visits, then Appointments and Visits.   3. Locate your upcoming video visit.  4. Complete the eCheck-In process. Help for eCheck-In.     Note: If you are already checked in for your appointment, the eCheck-In button will not appear.   5. Select the BEGIN VIDEO VISIT button. This will launch you to our Virtual Visit web page.  6. You will be prompted to complete a technology check and confirm callback number. Follow the steps to make sure your computer has everything needed for your video visit.  7. Select the Start Your Visit button to enter the waiting room. Please wait on this screen until someone from your care team calls you.     Privacy: Video visits are done using secure, HIPPA-compliant video connections.  Unauthorized recording is not allowed.     For questions about your scheduled video visit, call a support agent at [phone] (7:00 AM - 11:30 PM, 7 days a week)     For questions on how to activate your [employer] account, contact [employer] support at [phone]      For informational purposes only. Not to replace the advice of your health care provider. Copyright   2020 [facility]. All rights reserved. Clinically reviewed by Cheyenne Barragan MD. [employer]snap [id number] - 06/20.      The following health maintenance items are reviewed in Epic and correct as of today:  Health Maintenance   Topic Date Due     ADVANCE CARE PLANNING  03/25/2018     MEDICARE ANNUAL WELLNESS VISIT  11/20/2018     LIPID  07/18/2019     DTAP/TDAP/TD IMMUNIZATION (2 - Td) 08/03/2019     PNEUMOCOCCAL  IMMUNIZATION 65+ LOW/MEDIUM RISK (2 of 2 - PPSV23) 10/26/2020     FALL RISK ASSESSMENT  03/20/2021     COLORECTAL CANCER SCREENING  07/12/2028     HEPATITIS C SCREENING  Completed     PHQ-2  Completed     INFLUENZA VACCINE  Completed     ZOSTER IMMUNIZATION  Completed     AORTIC ANEURYSM SCREENING (SYSTEM ASSIGNED)  Completed     IPV IMMUNIZATION  Aged Out     MENINGITIS IMMUNIZATION  Aged Out     HEPATITIS B IMMUNIZATION  Aged Out       Video-Visit Details    This visit is being conducted as a virtual visit due to the emphasis on mitigation of the COVID-19 virus pandemic.   Type of service:  Video Visit    Originating Location (pt. Location): Home    Distant Location (provider location):  [facility]     Platform used for Video Visit: Apple Facetime    Filemon Magana MD  [facility]  Family Practice  [facility]  [phone]    Video End Time:5:55 PM  For any issues my office # is [phone]

## 2020-10-15 ENCOUNTER — TRANSFERRED RECORDS (OUTPATIENT)
Dept: FAMILY MEDICINE | Facility: CLINIC | Age: 65
End: 2020-10-15

## 2020-10-29 ENCOUNTER — VIRTUAL VISIT (OUTPATIENT)
Dept: FAMILY MEDICINE | Facility: CLINIC | Age: 65
End: 2020-10-29

## 2020-10-29 DIAGNOSIS — M48.062 SPINAL STENOSIS OF LUMBAR REGION WITH NEUROGENIC CLAUDICATION: ICD-10-CM

## 2020-10-29 DIAGNOSIS — M62.838 MUSCLE SPASM: ICD-10-CM

## 2020-10-29 PROCEDURE — 99214 OFFICE O/P EST MOD 30 MIN: CPT | Mod: 95 | Performed by: FAMILY MEDICINE

## 2020-10-29 RX ORDER — HYDROMORPHONE HYDROCHLORIDE 4 MG/1
4 TABLET ORAL AT BEDTIME
Qty: 30 TABLET | Refills: 0 | Status: SHIPPED | OUTPATIENT
Start: 2020-10-29 | End: 2020-12-10

## 2020-10-29 RX ORDER — DIAZEPAM 10 MG
TABLET ORAL
Qty: 30 TABLET | Refills: 0 | Status: SHIPPED | OUTPATIENT
Start: 2020-10-29 | End: 2021-06-29

## 2020-12-10 ENCOUNTER — OFFICE VISIT (OUTPATIENT)
Dept: FAMILY MEDICINE | Facility: CLINIC | Age: 65
End: 2020-12-10

## 2020-12-10 VITALS
BODY MASS INDEX: 26.15 KG/M2 | WEIGHT: 172 LBS | HEART RATE: 76 BPM | DIASTOLIC BLOOD PRESSURE: 71 MMHG | SYSTOLIC BLOOD PRESSURE: 156 MMHG | TEMPERATURE: 98.1 F | OXYGEN SATURATION: 96 %

## 2020-12-10 DIAGNOSIS — I10 BENIGN ESSENTIAL HYPERTENSION: Primary | ICD-10-CM

## 2020-12-10 DIAGNOSIS — Z23 NEED FOR VACCINATION: ICD-10-CM

## 2020-12-10 DIAGNOSIS — M48.062 SPINAL STENOSIS OF LUMBAR REGION WITH NEUROGENIC CLAUDICATION: ICD-10-CM

## 2020-12-10 DIAGNOSIS — N48.1 BALANITIS: ICD-10-CM

## 2020-12-10 DIAGNOSIS — E78.5 HYPERLIPIDEMIA LDL GOAL <160: ICD-10-CM

## 2020-12-10 LAB
AMPHETAMINES (AMP) UR: NEGATIVE
BARBITURATES (BAR) UR: NEGATIVE
BENZODIAZEPINES (BZO) UR: NORMAL
BUPRENORPHINE (BUP) UR: NEGATIVE
CANNABINOIDS (THC) UR: NEGATIVE
COCAINE (COC) UR: NEGATIVE
MDMA UR: NEGATIVE
METHADONE (MTD) UR: NEGATIVE
METHAMPHETAMINE (METHA) UR: NEGATIVE
OPIATES (OPI) UR: NEGATIVE
OXYCODONE (OXYCO) UR: NEGATIVE
PCP UR: NEGATIVE
SPECIMEN VALIDITY INTERNAL QC UR: NORMAL
SPECIMEN VALIDITY TEMPERATURE UR: NORMAL
SPECIMEN VALIDITY UR CREATININE: NORMAL MG/DL (ref 20–200)
SPECIMEN VALIDITY UR PH: 5 (ref 4–9)
SPECIMEN VALIDITY UR SPECIFIC GRAVITY: 1.01 (ref 1–1.02)

## 2020-12-10 PROCEDURE — 90732 PPSV23 VACC 2 YRS+ SUBQ/IM: CPT | Performed by: FAMILY MEDICINE

## 2020-12-10 PROCEDURE — 93050 ART PRESSURE WAVEFORM ANALYS: CPT | Performed by: FAMILY MEDICINE

## 2020-12-10 PROCEDURE — G0009 ADMIN PNEUMOCOCCAL VACCINE: HCPCS | Performed by: FAMILY MEDICINE

## 2020-12-10 PROCEDURE — 90472 IMMUNIZATION ADMIN EACH ADD: CPT | Mod: 59 | Performed by: FAMILY MEDICINE

## 2020-12-10 PROCEDURE — 36415 COLL VENOUS BLD VENIPUNCTURE: CPT | Performed by: FAMILY MEDICINE

## 2020-12-10 PROCEDURE — 90632 HEPA VACCINE ADULT IM: CPT | Performed by: FAMILY MEDICINE

## 2020-12-10 PROCEDURE — 80306 DRUG TEST PRSMV INSTRMNT: CPT | Performed by: FAMILY MEDICINE

## 2020-12-10 PROCEDURE — 99214 OFFICE O/P EST MOD 30 MIN: CPT | Mod: 25 | Performed by: FAMILY MEDICINE

## 2020-12-10 RX ORDER — METOPROLOL SUCCINATE 50 MG/1
50 TABLET, EXTENDED RELEASE ORAL DAILY
Qty: 90 TABLET | Refills: 3 | Status: SHIPPED | OUTPATIENT
Start: 2020-12-10 | End: 2021-11-17

## 2020-12-10 RX ORDER — NYSTATIN AND TRIAMCINOLONE ACETONIDE 100000; 1 [USP'U]/G; MG/G
CREAM TOPICAL 2 TIMES DAILY PRN
Qty: 15 G | Refills: 0 | Status: SHIPPED | OUTPATIENT
Start: 2020-12-10 | End: 2021-09-28

## 2020-12-10 RX ORDER — HYDROMORPHONE HYDROCHLORIDE 4 MG/1
4 TABLET ORAL AT BEDTIME
Qty: 30 TABLET | Refills: 0 | Status: SHIPPED | OUTPATIENT
Start: 2020-12-10 | End: 2021-01-24

## 2020-12-10 NOTE — LETTER
[facility]  12/10/20    Patient: Mane Chawla  YOB: 1955  Medical Record Number: [id number]                                                                  Opioid / Opioid Plus Controlled Substance Agreement    I understand that my care provider has prescribed an opioid (narcotic) controlled substance to help manage my condition(s). I am taking this medicine to help me function or work. I know this is strong medicine, and that it can cause serious side effects. Opioid medicine can be sedating, addicting and may cause a dependency on the drug. They can affect my ability to drive or think, and cause depression. They need to be taken exactly as prescribed. Combining opioids with certain medicines or chemicals (such as cocaine, sedatives and tranquilizers, sleeping pills, meth) can be dangerous or even fatal. Also, if I stop opioids suddenly, I may have severe withdrawal symptoms. Last, I understand that opioids do not work for all types of pain nor for all patients. If not helpful, I may be asked to stop them.    The risks, benefits, and side effects of these medicine(s) were explained to me. I agree that:    1. I will take part in other treatments as advised by my care team. This may be psychiatry or counseling, physical therapy, behavioral therapy, group treatment or a referral to a pain clinic. I will reduce or stop my medicine when my care team tells me to do so.  2. I will take my medicines as prescribed. I will not change the dose or schedule unless my care team tells me to. There will be no refills if I  run out early.   I may be contactedwithout warning and asked to complete a urine drug test or pill count at any time.   3. I will keep all my appointments, and understand this is part of the monitoring of opioids. My care team may require an office visit for EVERY opioid/controlled substance refill. If I miss appointments or don t follow instructions, my care team may stop my  medicine.  4. I will not ask other providers to prescribe controlled substances, and I will not accept controlled substances from other people. If I need another prescribed controlled substance for a new reason, I will tell my care team within 1 business day.  5. I will use one pharmacy to fill all of my controlled substance prescriptions, and it is up to me to make sure that I do not run out of my medicines on weekends or holidays. If my care team is willing to refill my opioid prescription without a visit, I must request refills only during office hours, refills may take up to 3 days to process, and it may take up to 5 to 7 days for my medicine to be mailed and ready at my pharmacy. Prescriptions will not be mailed anywhere except my pharmacy.        [id number]  Rev 12/18         Registration to scan to EHR                             Page 1 of 2               Controlled Substance Agreement Opioid        [facility]  12/10/20  Patient: Mane Chawla  YOB: 1955  Medical Record Number: [id number]                                                                  6. I am responsible for my prescriptions. If the medicine/prescription is lost or stolen, it will not be replaced. I also agree not to share controlled substance medicines with anyone.  7. I agree to not use ANY illegal or recreational drugs. This includes marijuana, cocaine, bath salts or other drugs. I agree not to use alcohol unless my care team says I may.          I agree to give urine samples whenever asked. If I don t give a urine sample, the care team may stop my medicine.    8. If I enroll in the [place] Medical Marijuana program, I will tell my care team. I will also sign an agreement to share my medical records with my care team.   9. I will bring in my list of medicines (or my medicine bottles) each time I come to the clinic.   10. I will tell my care team right away if I become pregnant or have a new medical problem  treated outside of my regular clinic.  11. I understand that this medicine can affect my thinking and judgment. It may be unsafe for me to drive, use machinery and do dangerous tasks. I will not do any of these things until I know how the medicine affects me. If my dose changes, I will wait to see how it affects me. I will contact my care team if I have concerns about medicine side effects.    I understand that if I do not follow any of the conditions above, my prescriptions or treatment may be stopped.      I agree that my provider, clinic care team, and pharmacy may work with any city, state or federal law enforcement agency that investigates the misuse, sale, or other diversion of my controlled medicine. I will allow my provider to discuss my care with or share a copy of this agreement with any other treating provider, pharmacy or emergency room where I receive care. I agree to give up (waive) any right of privacy or confidentiality with respect to these consents.     I have read this agreement and have asked questions about anything I did not understand.      ________________________________________________________________________  Patient signature - Date/Time -  Mane Chawla                                      ________________________________________________________________________  Witness signature                                                            ________________________________________________________________________  Provider signature - Filemon Magana MD      [id number]  Rev 12/18         Registration to scan to EHR                         Page 2 of 2                   Controlled Substance Agreement Opioid           Page 1 of 2  Opioid Pain Medicines (also known as Narcotics)  What You Need to Know    What are opioids?   Opioids are pain medicines that must be prescribed by a doctor.  They are also known as narcotics.    Examples are:     morphine (MS Contin, Jennifer)    oxycodone  (Oxycontin)    oxycodone and acetaminophen (Percocet)    hydrocodone and acetaminophen (Vicodin, Norco)     fentanyl patch (Duragesic)     hydromorphone (Dilaudid)     methadone     What do opioids do well?   Opioids are best for short-term pain after a surgery or injury. They also work well for cancer pain. Unlike other pain medicines, they do not cause liver or kidney failure or ulcers. They may help some people with long-lasting (chronic) pain.     What do opioids NOT do well?   Opioids never get rid of pain entirely, and they do not work well for most patients with chronic pain. Opioids do not reduce swelling, one of the causes of pain. They also don t work well for nerve pain.                           For informational purposes only.  Not to replace the advice of your care provider.  Copyright [address] [facility]. All right reserved. [employer] [id number] 02/18.      Page 2 of 2    Risks and side effects   Talk to your doctor before you start or decide to keep taking one of these medicines. Side effects include:    Lowering your breathing rate enough to cause death    Overdose, including death, especially if taking higher than prescribed doses    Long-term opioid use    Worse depression symptoms; less pleasure in things you usually enjoy    Feeling tired or sluggish    Slower thoughts or cloudy thinking    Being more sensitive to pain over time; pain is harder to control    Trouble sleeping or restless sleep    Changes in hormone levels (for example, less testosterone)    Changes in sex drive or ability to have sex    Constipation    Unsafe driving    Itching and sweating    Feeling dizzy    Nausea, vomiting and dry mouth    What else should I know about opioids?  When someone takes opioids for too long or too often, they become dependent. This means that if you stop or reduce the medicine too quickly, you will have withdrawal symptoms.    Dependence is not the same as addiction. Addiction is when  people keep using a substance that harms their body, their mind or their relations with others. If you have a history of drug or alcohol abuse, taking opioids can cause a relapse.    Over time, opioids don t work as well. Most people will need higher and higher doses. The higher the dose, the more serious the side effects. We don t know the long-term effects of opioids.      Prescribed opioids aren't the best way to manage chronic pain    Other ways to manage pain include:      Ibuprofen or acetaminophen.  You should always try this first.      Treat health problems that may be causing pain.      acupuncture or massage, deep breathing, meditation, visual imagery, aromatherapy.      Use heat or ice at the pain site      Physical therapy and exercise      Stop smoking      See a counselor or therapist                                                  People who have used opioids for a long time may have a lower quality of life, worse depression, higher levels of pain and more visits to doctors.    Never share your opioids with others. Be sure to store opioids in a secure place, locked if possible.Young children can easily swallow them and overdose.     You can overdose on opioids.  Signs of overdose include decrease or loss of consciousness, slowed breathing, trouble waking and blue lips.  If someone is worried about overdose, they should call 911.    If you are at risk for overdose, you may get naloxone (Narcan, a medicine that reverses the effects of opioids.  If you overdose, a friend or family member can give you Narcan while waiting for the ambulance.  They need to know the signs of overdose and how to give Narcan.    While you're taking opioids:    Don't use alcohol or street drugs. Taking them together can cause death.    Don't take any of these medicines unless your doctor says its okay.  Taking these with opioids can cause death.    Benzodiazepines (such as lorazepam         or diazepam)    Muscle relaxers  (such as cyclobenzaprine)    sleeping pills    other opioids    Safe disposal of opioids  Find your area drug take-back program, your pharmacy mail-back program, buy a special disposal bag (such as Deterra) from your pharmacy or flush them down the toilet.  Use the guidelines at:  www.fda.gov/drugs/resourcesforyou

## 2020-12-10 NOTE — PROGRESS NOTES
Getting cramps in the ankles and feet, happens most nights over the past two weeks.  Not working as much.    Back pain, doing PT, has had two si joints,  Working WITH [employer]     Has gone down to a half pill some nights!    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   he has not experienced any significant side effects from medications for hypertension.    NO active cardiac complaints or symptoms with exercise.  Current medications for treatment:  Diuretic thiazide (chlorthalidone, hydrochlorothiazide, indapamide, Beta Blockers (metoprolol, propranolol, atenolol and Calcium channel blocker (Amlodipine, Diltiazem,Verapamil)    Reviewed last 6 BP readings in chart:  BP Readings from Last 6 Encounters:   12/10/20 (!) 156/71   09/29/20 138/40   08/18/20 (!) 143/80   06/25/20 (!) 148/64   05/26/20 (!) 149/79   04/23/20 (!) 161/88       Problem(s) Oriented visit      ROS:  General and CV completed and negative except as noted above     HISTORY:   reports current alcohol use of about 8.3 standard drinks of alcohol per week.   reports that he quit smoking about 24 years ago. His smoking use included cigarettes. He has a 30.00 pack-year smoking history. He has never used smokeless tobacco.    Past Medical History:   Diagnosis Date     ACP (advance care planning) 3-25-13     GERD (gastroesophageal reflux disease)      HTN, goal below 140/80      Hypercholesterolemia      Hyperlipidemia LDL goal <160 6/15/2011     Psoriatic arthritis (H)      Past Surgical History:   Procedure Laterality Date     ARTHROSCOPY KNEE RT/LT       CARPAL TUNNEL RELEASE RT/LT       FUSION CERVICAL ANTERIOR THREE+ LEVELS       FUSION LUMBAR ANTERIOR ONE LEVEL       ROTATOR CUFF REPAIR RT/LT         EXAM:  BP: 156/71[AtCor[   Pulse: 76    Temp: 98.1    Wt Readings from Last 2 Encounters:   12/10/20 78 kg (172 lb)   12/31/19 83.5 kg (184 lb)        BMI= Body mass index is 26.15 kg/m .    EXAM:  APPEARANCE: = Relaxed and in no distress  Conj/Eyelids = noninjected and lids and lashes are without inflammation  PERRLA/Irises = Pupils Round Reactive to Light and Irisis without inflammation  Neck = No anterior or posterior adenopathy appreciated.  Thyroid = Not enlarged and no masses felt  Resp effort = Calm regular breathing  Breath Sounds = Good air movement with no rales or rhonchi in any lung fields  Heart Rate, Rythym, & sounds (no Murm)  = Regular rate and rythym with no S3, S4, or murmer appreciated.  Carotid Art's = Pulses full and equal and no bruits appreciated  Abdomen = Soft, nontender, no masses, & bowel sounds in all quadrants  Liver/Spleen = Normal span and no splenomegaly noted  Digits and Nails = FROM in all finger joints, no nail dystrophy  Ext (edema) = No pretibial edema noted or elsewhere  Musculsktl =  Strength and ROM of major joints are within normal limits  SKIN = absent significant rashes or lesions   Recent/Remote Memory = Alert and Oriented x 3  Mood/Affect = Cooperative and interested      Assessment/Plan:  Mane was seen today for hypertension.    Diagnoses and all orders for this visit:    Benign essential hypertension  Reviewed his current HTN management. Discussed our goal for him is a systolic pressure at or below 128 and diastolic pressure at or below 83.  We today managed his prescriptions with refills ensured to ensure availabilty of current medications.  Discussed the importance for aggressive management of HTN to prevent vascular complications later.  Recommended lower fat, lower carbohydrate, and lower sodium (<2000 mg)diet. Required intervals for follow up on HTN, lab studies reviewed.    Strongly recommened he follow his blood pressures outside the clinic to ensure that BPs are remaining within guidelines,.  Instructed to contact me if the readings are not within guidelines on a regular basis so we can adjust treatment as  "needed.      -     NE ART PRESS WAVEFORM ANALYS CENTRAL ART PRESSURE  -     metoprolol succinate ER (TOPROL-XL) 50 MG 24 hr tablet; Take 1 tablet (50 mg) by mouth daily    Balanitis  -     nystatin-triamcinolone (MYCOLOG II) 725601-3.1 UNIT/GM-% external cream; Apply topically 2 times daily as needed (skin irritation)    Spinal stenosis of lumbar region with neurogenic claudication  Continue therapy  -     ToxASSURE Urine Drug Screen ([facility])  -     HYDROmorphone (DILAUDID) 4 MG tablet; Take 1 tablet (4 mg) by mouth At Bedtime    Need for vaccination  -     PPSV23, IM/SUBQ (2+ YRS) - [id number]  -     ADMIN PNEUMOCOCCAL VACCINE    Hyperlipidemia LDL goal <160  Hyperlipidemia  Discussed current lipid results, previous results (if available) current guidelines (NCEP) for treatment and goals for lipids.    Discussed ongoing lifestyle modification, dietary changes (low fat, low simple carb) and regular aerobic exercise.    Discussed the link between dysmetabolic syndrome and impaired glucose tolerance seen in certain patterns of lipids.     Reviewed medication use for lipid lowering, including the statins are their possible side effects of myalgias, rhabdomyolysis, and liver toxicity.  We today managed his prescriptions with refills ensured to ensure availabilty of current medications.  Discussed the importance for aggressive management of dyslipidemia to prevent vascular complications later.         -     Lipid Panel ([facility])    Other orders  -     HEPATITIS A VACCINE (ADULT)        COUNSELING:   reports that he quit smoking about 24 years ago. His smoking use included cigarettes. He has a 30.00 pack-year smoking history. He has never used smokeless tobacco.    Estimated body mass index is 26.15 kg/m  as calculated from the following:    Height as of 6/25/20: 1.727 m (5' 8\").    Weight as of this encounter: 78 kg (172 lb).       Appropriate preventive services were discussed with this patient, including applicable " screening as appropriate for cardiovascular disease, diabetes, osteopenia/osteoporosis, and glaucoma.  As appropriate for age/gender, discussed screening for colorectal cancer, prostate cancer, breast cancer, and cervical cancer. Checklist reviewing preventive services available has been given to the patient.    Reviewed patients plan of care and provided an AVS. The Basic Care Plan (routine screening as documented in Health Maintenance) for Mane meets the Care Plan requirement. This Care Plan has been established and reviewed with the  Patient.      The following health maintenance items are reviewed in Epic and correct as of today:  Health Maintenance   Topic Date Due     ADVANCE CARE PLANNING  03/25/2018     MEDICARE ANNUAL WELLNESS VISIT  11/20/2018     LIPID  07/18/2019     DTAP/TDAP/TD IMMUNIZATION (2 - Td) 08/03/2019     Pneumococcal Vaccine: 65+ Years (2 of 2 - PPSV23) 10/26/2020     FALL RISK ASSESSMENT  03/20/2021     COLORECTAL CANCER SCREENING  07/12/2028     HEPATITIS C SCREENING  Completed     PHQ-2  Completed     INFLUENZA VACCINE  Completed     ZOSTER IMMUNIZATION  Completed     AORTIC ANEURYSM SCREENING (SYSTEM ASSIGNED)  Completed     Pneumococcal Vaccine: Pediatrics (0 to 5 Years) and At-Risk Patients (6 to 64 Years)  Aged Out     IPV IMMUNIZATION  Aged Out     MENINGITIS IMMUNIZATION  Aged Out     HEPATITIS B IMMUNIZATION  Aged Out       Filemon Magana  [facility]  For any issues my office # is [phone]

## 2020-12-11 LAB
CHOLEST SERPL-MCNC: 310 MG/DL (ref 100–199)
COMMENT:: ABNORMAL
HDLC SERPL-MCNC: 60 MG/DL
LDL/HDL RATIO: 3.8 RATIO (ref 0–3.6)
LDLC SERPL CALC-MCNC: 230 MG/DL (ref 0–99)
TRIGL SERPL-MCNC: 116 MG/DL (ref 0–149)
VLDLC SERPL CALC-MCNC: 20 MG/DL (ref 5–40)

## 2020-12-17 ENCOUNTER — TELEPHONE (OUTPATIENT)
Dept: FAMILY MEDICINE | Facility: CLINIC | Age: 65
End: 2020-12-17

## 2020-12-17 DIAGNOSIS — E78.00 HYPERCHOLESTEREMIA: Primary | ICD-10-CM

## 2020-12-17 RX ORDER — ROSUVASTATIN CALCIUM 10 MG/1
10 TABLET, COATED ORAL DAILY
Qty: 90 TABLET | Refills: 0 | Status: SHIPPED | OUTPATIENT
Start: 2020-12-17 | End: 2021-03-13

## 2020-12-17 NOTE — TELEPHONE ENCOUNTER
----- Message from Filemon Magana MD sent at 12/11/2020  2:57 PM CST -----  Can you review with him his HMG COA rectase allergies?  With this cholesterol, I would really like to get him started on 10 mg of Crestor.  KN

## 2020-12-17 NOTE — TELEPHONE ENCOUNTER
Called patient with message from Dr Magana. Patient says he was on Crestor years ago and did not like how it made him feel but he will try it again.  Prescription sent to pharmacy.  Patient will carolyn lipids in 6-8 weeks.

## 2021-01-15 ENCOUNTER — HEALTH MAINTENANCE LETTER (OUTPATIENT)
Age: 66
End: 2021-01-15

## 2021-01-22 DIAGNOSIS — M62.838 MUSCLE SPASM: ICD-10-CM

## 2021-01-22 DIAGNOSIS — M48.062 SPINAL STENOSIS OF LUMBAR REGION WITH NEUROGENIC CLAUDICATION: ICD-10-CM

## 2021-01-22 RX ORDER — DIAZEPAM 10 MG
TABLET ORAL
Qty: 30 TABLET | Refills: 0 | Status: CANCELLED | OUTPATIENT
Start: 2021-01-22

## 2021-01-24 RX ORDER — HYDROMORPHONE HYDROCHLORIDE 4 MG/1
4 TABLET ORAL AT BEDTIME
Qty: 30 TABLET | Refills: 0 | Status: SHIPPED | OUTPATIENT
Start: 2021-01-24 | End: 2021-03-11

## 2021-01-26 ENCOUNTER — VIRTUAL VISIT (OUTPATIENT)
Dept: FAMILY MEDICINE | Facility: CLINIC | Age: 66
End: 2021-01-26

## 2021-01-26 VITALS — TEMPERATURE: 98.1 F

## 2021-01-26 DIAGNOSIS — L40.50 PSORIATIC ARTHRITIS (H): ICD-10-CM

## 2021-01-26 DIAGNOSIS — M48.062 SPINAL STENOSIS OF LUMBAR REGION WITH NEUROGENIC CLAUDICATION: Primary | ICD-10-CM

## 2021-01-26 DIAGNOSIS — E78.00 HYPERCHOLESTEREMIA: ICD-10-CM

## 2021-01-26 DIAGNOSIS — E78.5 HYPERLIPIDEMIA LDL GOAL <160: ICD-10-CM

## 2021-01-26 DIAGNOSIS — I70.0 ATHEROSCLEROSIS OF AORTA (H): ICD-10-CM

## 2021-01-26 PROCEDURE — 99214 OFFICE O/P EST MOD 30 MIN: CPT | Mod: 95 | Performed by: FAMILY MEDICINE

## 2021-01-26 PROCEDURE — 36415 COLL VENOUS BLD VENIPUNCTURE: CPT | Performed by: FAMILY MEDICINE

## 2021-01-26 NOTE — PROGRESS NOTES
Chronic Pain Follow-Up  Taking medication as directed? Yes  Where in your body do you have pain? back - bilateral aching, dull and miserable    How does your pain affect your ability to work? Pain does not limit ability to work  How much does your current medication allow you to function in your daily life? 26 - 50%  How many days per week do you miss taking your medication? 0  When did we last try and wean your current dosage? several years ago  How did that weaning go? Not doing well    Besides pain medication, how else are you managing your pain? Activity or exercise, Physical Therapy, Steroid Injections and Other: Neurosurgery  How well are you sleeping? Fair  How has your mood been since your last visit? About the same  Have you had a significant life event? Other: Covid  Other aggravating factors: Last PARAMJIT didn't seem to do much    How many days per week do you exercise enough to make your heart beat faster? 5  How many minutes a day do you exercise enough to make your heart beat faster? 20 - 29      PHQ-9 SCORE 11/22/2016 6/1/2018 3/28/2019   PHQ-9 Total Score 3 2 5     TIFFANIE-7 SCORE 11/22/2016 6/1/2018 3/28/2019   Total Score 3 1 1     No flowsheet data found.  Encounter-Level CSA - 07/12/2018:    Controlled Substance Agreement - Scan on 7/12/2018  3:40 PM: CONTROLLED SUBSTANCE AGREEMENT 7/12/18     Encounter-Level CSA - 04/27/2017:    Controlled Substance Agreement - Scan on 4/27/2017  3:48 PM: CONTROLLED SUBSTANCE AGREEMENT 4/27/17     Patient-Level CSA:    Controlled Substance Agreement - Opioid - Scan on 12/10/2020  2:07 PM: CONTROLLED SUBSTANCE AGREEMENT FORM 12/10/20           Smoking:  no  Passive smoke exposure:  no  Alcohol:  no  Drugs:  no    Assessment/Plan:  Mane was seen today for recheck medication.    Diagnoses and all orders for this visit:    Spinal stenosis of lumbar region with neurogenic claudication  Jai called in.  Discussed side effects associated with narcotic pain meds including  "GI disturbances (such as diarrhea, nausea, vomiting, constipation, etc.), drowsiness, decreased cognition, narcotic withdrawal, and narcotic addiction/dependence.  Told the patient never to drink alcohol while taking this medication and not to drive or operate dangerous machinery while taking it.  Also discussed in specific detail my expectation regarding responsible use of narcotic and the implied \"contract\" with the patient that this type of medication will be used properly and exactly as instructed, and that any abnormal behaviors associated with the use of this medication will cause me to stop prescribing these medications at any time.      Psoriatic arthritis (H)    Atherosclerosis of aorta (H)  -     aspirin (ASA) 81 MG EC tablet; Take 1 tablet (81 mg) by mouth daily    Hyperlipidemia LDL goal <160  Discussed current lipid results, previous results (if available) current guidelines (NCEP) for treatment and goals for lipids.  Discussed lifestyle modification, dietary changes (low fat, low simple carb) and regular aerobic exercise.  Discussed the link between dysmetabolic syndrome and impaired glucose tolerance seen in certain patterns of lipids.  Briefly discussed medication used for lipid lowering, including the statins are their possible side effects of myalgias, rhabdomyolysis, and liver toxicity.  Lipids pending/    Filemon Magana MD MD  [facility]  [phone]            "

## 2021-01-26 NOTE — NURSING NOTE
Patient here for lipid due to med change but a few days too early. Explained insurance may not cover and he still wants it done. Waiver signed.

## 2021-01-27 LAB
CHOLEST SERPL-MCNC: 214 MG/DL (ref 100–199)
HDLC SERPL-MCNC: 49 MG/DL
LDL/HDL RATIO: 2.7 RATIO (ref 0–3.6)
LDLC SERPL CALC-MCNC: 134 MG/DL (ref 0–99)
TRIGL SERPL-MCNC: 173 MG/DL (ref 0–149)
VLDLC SERPL CALC-MCNC: 31 MG/DL (ref 5–40)

## 2021-03-11 ENCOUNTER — VIRTUAL VISIT (OUTPATIENT)
Dept: FAMILY MEDICINE | Facility: CLINIC | Age: 66
End: 2021-03-11

## 2021-03-11 DIAGNOSIS — M48.062 SPINAL STENOSIS OF LUMBAR REGION WITH NEUROGENIC CLAUDICATION: ICD-10-CM

## 2021-03-11 PROCEDURE — 99213 OFFICE O/P EST LOW 20 MIN: CPT | Mod: 95 | Performed by: NURSE PRACTITIONER

## 2021-03-11 RX ORDER — HYDROMORPHONE HYDROCHLORIDE 4 MG/1
4 TABLET ORAL AT BEDTIME
Qty: 30 TABLET | Refills: 0 | Status: SHIPPED | OUTPATIENT
Start: 2021-03-11 | End: 2021-04-15

## 2021-03-11 NOTE — PROGRESS NOTES
Problem(s) Oriented visit    Video-Visit Details    Type of service:  Video Visit    Video Start Time (time video started): 0957    Video End Time (time video stopped): 1011    Originating Location (pt. Location): Home    Distant Location (provider location):  [facility]     Mode of Communication:  Video Conference via [facility]    Physician has received verbal consent for a Video Visit from the patient? Yes    This was a virtual video-visit conducted during COVID-19 outbreak in regulation with social distancing and quarantine recommendations of the CDC and [place] department of health and human services. A two way audio/video connection was used in real time with patient's consent.    ELIN Stephens CNP    SUBJECTIVE:                                                    Mane Chawla is a 65 year old male who presents to clinic today for the following health issues :    Spinal stenosis of lumbar region with neurogenic claudication  Chronic pain present - bilateral low back.   Takes Dilaudid once daily before bedtime. 1/2 after work, 1/2 at bedtime. Able to get through work during the day but pain makes it impossible for him to sleep if he doesn't take medication. Otherwise takes a lot of Ibuprofen. Takes 12-16 Ibuprofen (200 mg each) daily.  3-4 rounds of physical therapy, just finished a round up in [place].   Surgery 35 years ago which helped immensely until about 4 years ago. States it was recommended to him to have another surgery recently by spine surgeon in [place]. Would like second opinion from surgeon in [facility]  Last PARAMJIT 4-6 weeks ago, didn't help  Takes 1/2 Valium at bedtime as well    Problem list, Medication list, Allergies, and Medical/Social/Surgical histories reviewed in [facility] and updated as appropriate.   Additional history: as documented    ROS:  5 point ROS completed and negative except noted above, including Gen, CV, Resp, GI, MS    OBJECTIVE:                                                     No vitals taken due to telehealth visit    GENERAL: healthy, alert and no distress  PSYCH: mentation appears normal, affect normal/bright     ASSESSMENT/PLAN:                                                      Mane was seen today for refill request.    Diagnoses and all orders for this visit:    Spinal stenosis of lumbar region with neurogenic claudication  -     HYDROmorphone (DILAUDID) 4 MG tablet; Take 1 tablet (4 mg) by mouth At Bedtime  Controlled substance agreement in chart with PCP who is currently out of the office. No concerning findings on [place] . Chronic back pain with appropriate use of Dilaudid. Discussed adding Lidocaine patches and getting second opinion with spine surgeon on how to move forward.  PDMP Review       Value Time User    State PDMP site checked  Yes 3/11/2021  9:21 AM Falguni Douglas APRN CNP          See Patient Instructions  Patient Instructions   Dilaudid 30 tabs refilled    Recommend trying Salon pas lidocaine patches - on during day off at night    Recommend Dr. Dickerson - spine surgeon in [place] for second opinion      ELIN Stephens CNP  [facility]  Family Practice  [facility]  [phone]    For any issues my office # is [phone]

## 2021-03-11 NOTE — PATIENT INSTRUCTIONS
Dilaudid 30 tabs refilled    Recommend trying Salon pas lidocaine patches - on during day off at night    Recommend Dr. Dickerson - spine surgeon in [place] for second opinion

## 2021-03-13 DIAGNOSIS — E78.00 HYPERCHOLESTEREMIA: ICD-10-CM

## 2021-03-13 RX ORDER — ROSUVASTATIN CALCIUM 10 MG/1
TABLET, COATED ORAL
Qty: 90 TABLET | Refills: 0 | Status: SHIPPED | OUTPATIENT
Start: 2021-03-13 | End: 2021-06-01

## 2021-03-25 ENCOUNTER — TRANSFERRED RECORDS (OUTPATIENT)
Dept: FAMILY MEDICINE | Facility: CLINIC | Age: 66
End: 2021-03-25

## 2021-04-01 ENCOUNTER — OFFICE VISIT (OUTPATIENT)
Dept: FAMILY MEDICINE | Facility: CLINIC | Age: 66
End: 2021-04-01

## 2021-04-01 VITALS
WEIGHT: 179 LBS | DIASTOLIC BLOOD PRESSURE: 74 MMHG | OXYGEN SATURATION: 98 % | BODY MASS INDEX: 27.13 KG/M2 | HEIGHT: 68 IN | SYSTOLIC BLOOD PRESSURE: 138 MMHG | HEART RATE: 60 BPM

## 2021-04-01 DIAGNOSIS — M54.2 NECK PAIN: ICD-10-CM

## 2021-04-01 DIAGNOSIS — K62.5 RECTAL BLEEDING: Primary | ICD-10-CM

## 2021-04-01 PROCEDURE — 99214 OFFICE O/P EST MOD 30 MIN: CPT | Performed by: FAMILY MEDICINE

## 2021-04-01 ASSESSMENT — MIFFLIN-ST. JEOR: SCORE: 1553.5

## 2021-04-01 NOTE — PROGRESS NOTES
3 episodes of bright red blood  No tears.  Hemorrhoids    Onset: 1 week(s) ago    Symptoms:         Pain: no        Itching: no        Blood streaked toilet paper: YES        Blood in stool: YES  Changes in stool pattern:                    no    Associated Signs and Symptoms:  Abdominal pain: YES- some cramping  Fevers: no  Weight loss: no    History:           Any GI studies done:cologaurd negative          Family History of colon cancer: no    Does any thing make it better or worse:    no    Therapies tried-no straining w/ BMs    Did therapies make it better or worse: no      Neck pain,  Multiple fusions in the c spine   Problem(s) Oriented visit      ROS:  General and CV completed and negative except as noted above     HISTORY:   reports current alcohol use of about 8.3 standard drinks of alcohol per week.   reports that he quit smoking about 25 years ago. His smoking use included cigarettes. He has a 30.00 pack-year smoking history. He has never used smokeless tobacco.    Past Medical History:   Diagnosis Date     ACP (advance care planning) 3-25-13     GERD (gastroesophageal reflux disease)      HTN, goal below 140/80      Hypercholesterolemia      Hyperlipidemia LDL goal <160 6/15/2011     Psoriatic arthritis (H)      Past Surgical History:   Procedure Laterality Date     ARTHROSCOPY KNEE RT/LT       CARPAL TUNNEL RELEASE RT/LT       FUSION CERVICAL ANTERIOR THREE+ LEVELS       FUSION LUMBAR ANTERIOR ONE LEVEL       ROTATOR CUFF REPAIR RT/LT         EXAM:  BP: 138/74   Pulse: 60    Temp: Data Unavailable    Wt Readings from Last 2 Encounters:   04/01/21 81.2 kg (179 lb)   12/10/20 78 kg (172 lb)       BMI= Body mass index is 27.62 kg/m .    EXAM:  APPEARANCE: = Relaxed and in no distress  Conj/Eyelids = noninjected and lids and lashes are without inflammation  PERRLA/Irises = Pupils Round Reactive to Light and Irisis without inflammation  Ears/Nose = External structures and Nares have usual shape and  "form  Ear canals and TM's = Canals are not inflammed and have none or little wax and the drums are not injected and have a light reflex   Lips/Teeth/Gums = No lesions seen, good dentition, and gums seem healthy  Oropharynx = No leukoplakia, No injection to the tissues, Normal Uvula  Neck = No anterior or posterior adenopathy appreciated.  Resp effort = Calm regular breathing  Breath Sounds = Good air movement with no rales or rhonchi in any lung fields  Rectal = Anus without lesions, no polyps, Prostate nrl sized and no nodules  Mood/Affect = Cooperative and interested      Assessment/Plan:  Mane was seen today for rectal problem.    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    COUNSELING:   reports that he quit smoking about 25 years ago. His smoking use included cigarettes. He has a 30.00 pack-year smoking history. He has never used smokeless tobacco.    Estimated body mass index is 27.62 kg/m  as calculated from the following:    Height as of this encounter: 1.715 m (5' 7.5\").    Weight as of this encounter: 81.2 kg (179 lb).       Appropriate preventive services were discussed with this patient, including applicable screening as appropriate for cardiovascular disease, diabetes, osteopenia/osteoporosis, and glaucoma.  As appropriate for age/gender, discussed screening for colorectal cancer, prostate cancer, breast cancer, and cervical cancer. Checklist reviewing preventive services available has been given to the patient.    Reviewed patients plan of care and provided an AVS. The Basic Care Plan (routine screening as documented in Health Maintenance) for Mane meets the Care Plan requirement. This Care Plan has been established and reviewed with the  Patient.      The following health maintenance items are reviewed in Epic and correct as of " today:  Health Maintenance   Topic Date Due     ADVANCE CARE PLANNING  03/25/2018     MEDICARE ANNUAL WELLNESS VISIT  11/20/2018     DTAP/TDAP/TD IMMUNIZATION (2 - Td) 08/03/2019     URINE DRUG SCREEN  12/10/2021     FALL RISK ASSESSMENT  12/10/2021     LIPID  01/26/2026     COLORECTAL CANCER SCREENING  07/12/2028     HEPATITIS C SCREENING  Completed     PHQ-2  Completed     INFLUENZA VACCINE  Completed     Pneumococcal Vaccine: 65+ Years  Completed     ZOSTER IMMUNIZATION  Completed     AORTIC ANEURYSM SCREENING (SYSTEM ASSIGNED)  Completed     COVID-19 Vaccine  Completed     Pneumococcal Vaccine: Pediatrics (0 to 5 Years) and At-Risk Patients (6 to 64 Years)  Aged Out     IPV IMMUNIZATION  Aged Out     MENINGITIS IMMUNIZATION  Aged Out     HEPATITIS B IMMUNIZATION  Aged Out       Filemon Magana  [facility]  For any issues my office # is [phone]

## 2021-04-13 ENCOUNTER — TRANSFERRED RECORDS (OUTPATIENT)
Dept: FAMILY MEDICINE | Facility: CLINIC | Age: 66
End: 2021-04-13

## 2021-04-15 ENCOUNTER — VIRTUAL VISIT (OUTPATIENT)
Dept: FAMILY MEDICINE | Facility: CLINIC | Age: 66
End: 2021-04-15

## 2021-04-15 DIAGNOSIS — M48.062 SPINAL STENOSIS OF LUMBAR REGION WITH NEUROGENIC CLAUDICATION: ICD-10-CM

## 2021-04-15 DIAGNOSIS — Z86.0100 HISTORY OF COLONIC POLYPS: Primary | ICD-10-CM

## 2021-04-15 PROCEDURE — 99214 OFFICE O/P EST MOD 30 MIN: CPT | Mod: 95 | Performed by: FAMILY MEDICINE

## 2021-04-15 RX ORDER — IBUPROFEN 800 MG/1
800 TABLET, FILM COATED ORAL EVERY 8 HOURS PRN
Qty: 60 TABLET | Refills: 11 | Status: SHIPPED | OUTPATIENT
Start: 2021-04-15 | End: 2024-05-03

## 2021-04-15 RX ORDER — HYDROMORPHONE HYDROCHLORIDE 4 MG/1
4 TABLET ORAL AT BEDTIME
Qty: 30 TABLET | Refills: 0 | Status: SHIPPED | OUTPATIENT
Start: 2021-04-15 | End: 2021-05-11

## 2021-04-15 NOTE — PROGRESS NOTES
"    Video Problem(s) Oriented visit      Video Start Time: 10:06 AM    The patient has been notified of following:     \"This video visit will be conducted via a call between you and your physician/provider. We have found that certain health care needs can be provided without the need for an in-person physical exam.  This service lets us provide the care you need with a video conversation.  If a prescription is necessary we can send it directly to your pharmacy.  If lab work is needed we can place an order for that and you can then stop by our lab to have the test done at a later time.    Video visits are billed at different rates depending on your insurance coverage.  Please reach out to your insurance provider with any questions.    If during the course of the call the physician/provider feels a video visit is not appropriate, you will not be charged for this service.\"    Patient has given verbal consent for Video visit? Yes    How would you like to obtain your AVS? MyChart    Will anyone else be joining your video visit? No  SUBJECTIVE:                                                    Mane Chawla is a 65 year old male who presents to clinic today for the following health issues :        1. Spinal stenosis of lumbar region with neurogenic claudication  Chronic Pain Follow-Up    Where in your body do you have pain? back  How has your pain affected your ability to work? 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 Relaxation techniques / Biofeedback, Rest and Stretching  How well are you sleeping? Good  How has your mood been since your last visit? Better  Have you had a significant life event? No  Other aggravating factors: none  Taking medication as directed? Yes  Done PT 5 different times  PHQ-9 SCORE 11/22/2016 6/1/2018 3/28/2019   PHQ-9 Total Score 3 2 5     TIFFANIE-7 SCORE 11/22/2016 " 2018 3/28/2019   Total Score 3 1 1     No flowsheet data found.  Encounter-Level CSA - 2018:    Controlled Substance Agreement - Scan on 2018  3:40 PM: CONTROLLED SUBSTANCE AGREEMENT 18     Encounter-Level CSA - 2017:    Controlled Substance Agreement - Scan on 2017  3:48 PM: CONTROLLED SUBSTANCE AGREEMENT 17     Patient-Level CSA:    Controlled Substance Agreement - Opioid - Scan on 12/10/2020  2:07 PM: CONTROLLED SUBSTANCE AGREEMENT FORM 12/10/20         - HYDROmorphone (DILAUDID) 4 MG tablet; Take 1 tablet (4 mg) by mouth At Bedtime  Dispense: 30 tablet; Refill: 0       Problem list, Medication list, Allergies, and Medical/Social/Surgical histories reviewed in EPIC and updated as appropriate.   Additional history: as documented    ROS:  General and Resp. completed and negative except as noted above    Histories:   Patient Active Problem List   Diagnosis     Psoriatic arthritis (H)     Hyperlipidemia LDL goal <160     ACP (advance care planning)     Health Care Home     Midline low back pain without sciatica     Benign essential hypertension     Encounter for long-term (current) use of medications     Nonrheumatic aortic sclerosis     Spinal stenosis of lumbar region with neurogenic claudication     Atherosclerosis of aorta (H)     Past Surgical History:   Procedure Laterality Date     ARTHROSCOPY KNEE RT/LT       CARPAL TUNNEL RELEASE RT/LT       FUSION CERVICAL ANTERIOR THREE+ LEVELS       FUSION LUMBAR ANTERIOR ONE LEVEL       ROTATOR CUFF REPAIR RT/LT         Social History     Tobacco Use     Smoking status: Former Smoker     Packs/day: 1.00     Years: 30.00     Pack years: 30.00     Types: Cigarettes     Quit date: 3/21/1996     Years since quittin.0     Smokeless tobacco: Never Used   Substance Use Topics     Alcohol use: Yes     Alcohol/week: 8.3 standard drinks     Types: 10 Standard drinks or equivalent per week     Family History   Problem Relation Age of Onset  "    Cerebrovascular Disease Father      Alzheimer Disease Father      Respiratory Mother            OBJECTIVE:                                                    There were no vitals taken for this visit.  There is no height or weight on file to calculate BMI.   APPEARANCE: = Relaxed and in no distress     ASSESSMENT/PLAN:                                                        Mane was seen today for refill request.    Diagnoses and all orders for this visit:    History of colonic polyps    Spinal stenosis of lumbar region with neurogenic claudication  Currently trying to stay under 1 pill a day  Will change to everyother month follow up.  -     HYDROmorphone (DILAUDID) 4 MG tablet; Take 1 tablet (4 mg) by mouth At Bedtime  -     ibuprofen (ADVIL/MOTRIN) 800 MG tablet; Take 1 tablet (800 mg) by mouth every 8 hours as needed for moderate pain    Discussed side effects associated with narcotic pain meds including GI disturbances (such as diarrhea, nausea, vomiting, constipation, etc.), drowsiness, decreased cognition, narcotic withdrawal, and narcotic addiction/dependence.  Told the patient never to drink alcohol while taking this medication and not to drive or operate dangerous machinery while taking it.  Also discussed in specific detail my expectation regarding responsible use of narcotic and the implied \"contract\" with the patient that this type of medication will be used properly and exactly as instructed, and that any abnormal behaviors associated with the use of this medication will cause me to stop prescribing these medications at any time.        MEDICATIONS:  Continue current medications without change  There are no Patient Instructions on file for this visit.    The following health maintenance items are reviewed in Epic and correct as of today:  Health Maintenance   Topic Date Due     ADVANCE CARE PLANNING  03/25/2018     MEDICARE ANNUAL WELLNESS VISIT  11/20/2018     DTAP/TDAP/TD IMMUNIZATION (2 - Td) " 08/03/2019     URINE DRUG SCREEN  12/10/2021     FALL RISK ASSESSMENT  12/10/2021     LIPID  01/26/2026     COLORECTAL CANCER SCREENING  07/12/2028     HEPATITIS C SCREENING  Completed     PHQ-2  Completed     INFLUENZA VACCINE  Completed     Pneumococcal Vaccine: 65+ Years  Completed     ZOSTER IMMUNIZATION  Completed     AORTIC ANEURYSM SCREENING (SYSTEM ASSIGNED)  Completed     COVID-19 Vaccine  Completed     Pneumococcal Vaccine: Pediatrics (0 to 5 Years) and At-Risk Patients (6 to 64 Years)  Aged Out     IPV IMMUNIZATION  Aged Out     MENINGITIS IMMUNIZATION  Aged Out     HEPATITIS B IMMUNIZATION  Aged Out       Video-Visit Details    This visit is being conducted as a virtual visit due to the emphasis on mitigation of the COVID-19 virus pandemic.   Type of service:  Video Visit    Originating Location (pt. Location): Home    Distant Location (provider location):  [facility]     Platform used for Video Visit: Unable to connect with video, audio only available    Filemon Magana MD  [facility]  Family Practice  [facility]  [phone]    Video End Time:10:19 AM  For any issues my office # is [phone]

## 2021-05-03 DIAGNOSIS — I10 BENIGN ESSENTIAL HYPERTENSION: ICD-10-CM

## 2021-05-03 RX ORDER — AMLODIPINE BESYLATE 10 MG/1
TABLET ORAL
Qty: 180 TABLET | Refills: 3 | Status: SHIPPED | OUTPATIENT
Start: 2021-05-03 | End: 2021-06-29 | Stop reason: SINTOL

## 2021-05-11 ENCOUNTER — OFFICE VISIT (OUTPATIENT)
Dept: FAMILY MEDICINE | Facility: CLINIC | Age: 66
End: 2021-05-11

## 2021-05-11 VITALS
RESPIRATION RATE: 16 BRPM | TEMPERATURE: 98.1 F | OXYGEN SATURATION: 96 % | WEIGHT: 184 LBS | DIASTOLIC BLOOD PRESSURE: 67 MMHG | HEIGHT: 68 IN | BODY MASS INDEX: 27.89 KG/M2 | SYSTOLIC BLOOD PRESSURE: 136 MMHG | HEART RATE: 73 BPM

## 2021-05-11 DIAGNOSIS — M48.062 SPINAL STENOSIS OF LUMBAR REGION WITH NEUROGENIC CLAUDICATION: ICD-10-CM

## 2021-05-11 DIAGNOSIS — K57.32 DIVERTICULITIS OF COLON: ICD-10-CM

## 2021-05-11 DIAGNOSIS — I10 BENIGN ESSENTIAL HYPERTENSION: ICD-10-CM

## 2021-05-11 DIAGNOSIS — R39.9 LOWER URINARY TRACT SYMPTOMS (LUTS): Primary | ICD-10-CM

## 2021-05-11 LAB
% GRANULOCYTES: 69.3 % (ref 42.2–75.2)
BACTERIA URINE: NORMAL
BILIRUB UR QL STRIP: NORMAL
BLOOD URINE DIP: NORMAL
CASTS/LPF: NORMAL
COLOR UR: YELLOW
CRYSTAL URINE: NORMAL
EPITHELIAL CELLS - QUEST: NORMAL
GLUCOSE UR STRIP-MCNC: NORMAL MG/DL
HCT VFR BLD AUTO: 38.4 % (ref 39–51)
HEMOGLOBIN: 13.6 G/DL (ref 13.4–17.5)
KETONES UR QL STRIP: NORMAL
LEUKOCYTE ESTERASE URINE DIP: NORMAL
LYMPHOCYTES NFR BLD AUTO: 23.7 % (ref 20.5–51.1)
MCH RBC QN AUTO: 31.5 PG (ref 27–31)
MCHC RBC AUTO-ENTMCNC: 35.4 G/DL (ref 33–37)
MCV RBC AUTO: 89 FL (ref 80–100)
MONOCYTES NFR BLD AUTO: 7 % (ref 1.7–9.3)
MUCOUS URINE: NORMAL
NITRITE UR QL STRIP: NORMAL
PH UR STRIP: 5.5 PH (ref 5–9)
PLATELET # BLD AUTO: 281 K/UL (ref 140–450)
PROT UR QL: NORMAL MG/DL (ref ?–0.01)
RBC # BLD AUTO: 4.31 X10/CMM (ref 4.2–5.9)
RBC URINE: NORMAL
SP GR UR STRIP: 1.02 (ref 1–1.02)
UROBILINOGEN UR QL STRIP: 0.2 EU/DL (ref 0.2–1)
WBC # BLD AUTO: 11.6 X10/CMM (ref 3.8–11)
WBC URINE: NORMAL

## 2021-05-11 PROCEDURE — 99214 OFFICE O/P EST MOD 30 MIN: CPT | Performed by: FAMILY MEDICINE

## 2021-05-11 PROCEDURE — 93050 ART PRESSURE WAVEFORM ANALYS: CPT | Performed by: FAMILY MEDICINE

## 2021-05-11 PROCEDURE — 81003 URINALYSIS AUTO W/O SCOPE: CPT | Performed by: FAMILY MEDICINE

## 2021-05-11 PROCEDURE — 36415 COLL VENOUS BLD VENIPUNCTURE: CPT | Performed by: FAMILY MEDICINE

## 2021-05-11 PROCEDURE — 85025 COMPLETE CBC W/AUTO DIFF WBC: CPT | Performed by: FAMILY MEDICINE

## 2021-05-11 RX ORDER — METRONIDAZOLE 500 MG/1
500 TABLET ORAL 3 TIMES DAILY
Qty: 21 TABLET | Refills: 0 | Status: SHIPPED | OUTPATIENT
Start: 2021-05-11 | End: 2021-05-18

## 2021-05-11 RX ORDER — HYDROMORPHONE HYDROCHLORIDE 4 MG/1
4 TABLET ORAL AT BEDTIME
Qty: 30 TABLET | Refills: 0 | Status: SHIPPED | OUTPATIENT
Start: 2021-05-11 | End: 2021-06-29

## 2021-05-11 RX ORDER — SULFAMETHOXAZOLE/TRIMETHOPRIM 800-160 MG
1 TABLET ORAL 2 TIMES DAILY
Qty: 14 TABLET | Refills: 0 | Status: SHIPPED | OUTPATIENT
Start: 2021-05-11 | End: 2021-06-29

## 2021-05-11 ASSESSMENT — MIFFLIN-ST. JEOR: SCORE: 1576.18

## 2021-05-11 NOTE — PROGRESS NOTES
ABDOMINAL   PAIN     Onset: 3 days    Description:   Character: Dull ache and Gnawing  Location: suprapubic region  Radiation: None    Intensity: moderate    Progression of Symptoms: intermittent    Accompanying Signs & Symptoms:  Fever/Chills: no   Nausea: YES  Vomiting: no   Diarrhea: no   Dysuria or Hematuria: YES   History:   Trauma: no   Previous similar pain: no    Previous tests done: Colonoscopy    Precipitating factors:   Does the pain change with:     Food: no      BM: no     Urination: no     Alleviating factors:         Therapies Tried and outcome: nothing      Problem(s) Oriented visit      ROS:  General and Resp. completed and negative except as noted above     HISTORY:   reports current alcohol use of about 8.3 standard drinks of alcohol per week.   reports that he quit smoking about 25 years ago. His smoking use included cigarettes. He has a 30.00 pack-year smoking history. He has never used smokeless tobacco.    Past Medical History:   Diagnosis Date     ACP (advance care planning) 3-25-13     GERD (gastroesophageal reflux disease)      HTN, goal below 140/80      Hypercholesterolemia      Hyperlipidemia LDL goal <160 6/15/2011     Psoriatic arthritis (H)      Past Surgical History:   Procedure Laterality Date     ARTHROSCOPY KNEE RT/LT       CARPAL TUNNEL RELEASE RT/LT       FUSION CERVICAL ANTERIOR THREE+ LEVELS       FUSION LUMBAR ANTERIOR ONE LEVEL       ROTATOR CUFF REPAIR RT/LT         EXAM:  BP: 136/67   Pulse: 73    Temp: 98.1    Wt Readings from Last 2 Encounters:   05/11/21 83.5 kg (184 lb)   04/01/21 81.2 kg (179 lb)       BMI= Body mass index is 28.39 kg/m .    EXAM:  APPEARANCE: = Relaxed and in no distress  Conj/Eyelids = noninjected and lids and lashes are without inflammation  PERRLA/Irises = Pupils Round Reactive to Light and Irisis without inflammation  Neck = No anterior or posterior adenopathy appreciated.  Thyroid = Not enlarged and no masses felt  Resp effort = Calm regular  breathing  Breath Sounds = Good air movement with no rales or rhonchi in any lung fields  Heart Rate, Rythym, & sounds (no Murm)  = Regular rate and rythym with no S3, S4, or murmer appreciated.  Carotid Art's = Pulses full and equal and no bruits appreciated  Abdomen: soft, nontender, no masses, & bowel sounds = tenderness moderate, rebound present a little  suprapubic  Liver/Spleen = Normal span and no splenomegaly noted  Digits and Nails = FROM in all finger joints, no nail dystrophy  Ext (edema) = No pretibial edema noted or elsewhere  Musculsktl =  Strength and ROM of major joints are within normal limits  SKIN = absent significant rashes or lesions   Recent/Remote Memory = Alert and Oriented x 3  Mood/Affect = Cooperative and interested      Assessment/Plan:  Mane was seen today for urinary problem.    Diagnoses and all orders for this visit:    Lower urinary tract symptoms (LUTS)  -     Urinalysis w/reflex protein, bili ([facility])    Benign essential hypertension  -     NH ART PRESS WAVEFORM ANALYS CENTRAL ART PRESSURE    Diverticulitis of colon  The symptoms and exam appear to be most consistent with diverticulitis.    Review the pathophysiology and anatomical/mechanical issues of diverticulosis and diverticulitis.  At this time, an Abd CT scan is not indicated.  Antibiotics will be initiated.   Will treat with combination of cipro/flagyl for 10 days.    Discussed bactrim and flagyl as the treatment of choice.     Discussed the side effects of metronidazole (flagyl) including nausea/vomitting, metallic taste, altered taste sensation, and risk of disulfram reaction associated with any alcohol use and therefore instructed the patient to avoid any and all alcohol while on this medication.  They were instructed to call if any side effects occur.   Told to go to the ER immediately if the symptoms worsen or change significantly in any way.     Low residue diet for 2-3 weeks, then go towards higher fiber diet to  "prevent future recurrences.   Told to avoid smaller harder foods (e.g. nuts, popcorn, seeds) in the future.  Patient was told to contact us with update in a few days to confirm they are getting better.   If they are not noticably better, then they need a CT scan.      -     metroNIDAZOLE (FLAGYL) 500 MG tablet; Take 1 tablet (500 mg) by mouth 3 times daily for 7 days  -     sulfamethoxazole-trimethoprim (BACTRIM DS) 800-160 MG tablet; Take 1 tablet by mouth 2 times daily  -     Comp. Metabolic Panel (14) ([facility])  -     CBC with Diff/Plt ([facility])    Spinal stenosis of lumbar region with neurogenic claudication  -     HYDROmorphone (DILAUDID) 4 MG tablet; Take 1 tablet (4 mg) by mouth At Bedtime        COUNSELING:   reports that he quit smoking about 25 years ago. His smoking use included cigarettes. He has a 30.00 pack-year smoking history. He has never used smokeless tobacco.    Estimated body mass index is 28.39 kg/m  as calculated from the following:    Height as of this encounter: 1.715 m (5' 7.5\").    Weight as of this encounter: 83.5 kg (184 lb).       Appropriate preventive services were discussed with this patient, including applicable screening as appropriate for cardiovascular disease, diabetes, osteopenia/osteoporosis, and glaucoma.  As appropriate for age/gender, discussed screening for colorectal cancer, prostate cancer, breast cancer, and cervical cancer. Checklist reviewing preventive services available has been given to the patient.    Reviewed patients plan of care and provided an AVS. The Basic Care Plan (routine screening as documented in Health Maintenance) for Mane meets the Care Plan requirement. This Care Plan has been established and reviewed with the  Patient.      The following health maintenance items are reviewed in Epic and correct as of today:  Health Maintenance   Topic Date Due     ADVANCE CARE PLANNING  03/25/2018     MEDICARE ANNUAL WELLNESS VISIT  11/20/2018     DTAP/TDAP/TD " IMMUNIZATION (2 - Td) 08/03/2019     URINE DRUG SCREEN  12/10/2021     FALL RISK ASSESSMENT  12/10/2021     LIPID  01/26/2026     COLORECTAL CANCER SCREENING  04/13/2031     HEPATITIS C SCREENING  Completed     PHQ-2  Completed     INFLUENZA VACCINE  Completed     Pneumococcal Vaccine: 65+ Years  Completed     ZOSTER IMMUNIZATION  Completed     AORTIC ANEURYSM SCREENING (SYSTEM ASSIGNED)  Completed     COVID-19 Vaccine  Completed     Pneumococcal Vaccine: Pediatrics (0 to 5 Years) and At-Risk Patients (6 to 64 Years)  Aged Out     IPV IMMUNIZATION  Aged Out     MENINGITIS IMMUNIZATION  Aged Out     HEPATITIS B IMMUNIZATION  Aged Out       Filemon Magana  [facility]  For any issues my office # is [phone]

## 2021-05-11 NOTE — LETTER
[facility] [address] [place]  [address]  Phone: [phone]    May 12, 2021      Mane Chawla  [address] [place] [id number]              Dear Mane,     I am writing to report that your included test results are within expected ranges. I do not suggest that we make any changes at this time.         Sincerely,     Filemon Magana M.D./Alexandro Hoff  Results for orders placed or performed in visit on 05/11/21   Urinalysis w/reflex protein, bili ([facility])     Status: None   Result Value Ref Range    Color Urine Yellow     pH Urine 5.5 5 - 9 pH    Specific Gravity Urine 1.020 1.005 - 1.025    Protein Urine neg 0.01 mg/dL    Glucose Urine neg     Ketones Urine trace     Leukocyte Esterase Urine neg     Blood Urine neg     Nitrite Urine Neg NEG    Bilirubin Urine Dip neg     Urobilinogen Urine 0.2 0.2 - 1.0 EU/dL    WBC Urine      RBC Urine      Epithelial Cells      Crystal Urine      Bacteria Urine      Mucous Urine      Casts/LPF     Comp. Metabolic Panel (14) ([facility])     Status: None   Result Value Ref Range    Glucose 95 65 - 99 mg/dL    Urea Nitrogen 19 8 - 27 mg/dL    Creatinine 0.88 0.76 - 1.27 mg/dL    eGFR If NonAfricn Am 89 >59 mL/min/1.73    eGFR If Africn Am 103 >59 mL/min/1.73    BUN/Creatinine Ratio 22 10 - 24    Sodium 138 134 - 144 mmol/L    Potassium 4.1 3.5 - 5.2 mmol/L    Chloride 99 96 - 106 mmol/L    Total CO2 21 20 - 29 mmol/L    Calcium 9.1 8.6 - 10.2 mg/dL    Protein Total 7.3 6.0 - 8.5 g/dL    Albumin 4.5 3.8 - 4.8 g/dL    Globulin, Total 2.8 1.5 - 4.5 g/dL    A/G Ratio 1.6 1.2 - 2.2    Bilirubin Total 0.5 0.0 - 1.2 mg/dL    Alkaline Phosphatase 74 39 - 117 IU/L    AST 22 0 - 40 IU/L    ALT 17 0 - 44 IU/L    Narrative    Performed at:  01 - [facility] [address], [place], [place]  [id number]  : Ahmet Craig MD, Phone:  [phone]   CBC with Diff/Plt ([facility])     Status: Abnormal   Result Value  Ref Range    WBC x10/cmm 11.6 (A) 3.8 - 11.0 x10/cmm    % Lymphocytes 23.7 20.5 - 51.1 %    % Monocytes 7.0 1.7 - 9.3 %    % Granulocytes 69.3 42.2 - 75.2 %    RBC x10/cmm 4.31 4.2 - 5.9 x10/cmm    Hemoglobin 13.6 13.4 - 17.5 g/dl    Hematocrit 38.4 (A) 39 - 51 %    MCV 89.0 80 - 100 fL    MCH 31.5 (A) 27.0 - 31.0 pg    MCHC 35.4 33.0 - 37.0 g/dL    Platelet Count 281 140 - 450 K/uL

## 2021-05-12 LAB
ALBUMIN SERPL-MCNC: 4.5 G/DL (ref 3.8–4.8)
ALBUMIN/GLOB SERPL: 1.6 {RATIO} (ref 1.2–2.2)
ALP SERPL-CCNC: 74 IU/L (ref 39–117)
ALT SERPL-CCNC: 17 IU/L (ref 0–44)
AST SERPL-CCNC: 22 IU/L (ref 0–40)
BILIRUB SERPL-MCNC: 0.5 MG/DL (ref 0–1.2)
BUN SERPL-MCNC: 19 MG/DL (ref 8–27)
BUN/CREATININE RATIO: 22 (ref 10–24)
CALCIUM SERPL-MCNC: 9.1 MG/DL (ref 8.6–10.2)
CHLORIDE SERPLBLD-SCNC: 99 MMOL/L (ref 96–106)
CREAT SERPL-MCNC: 0.88 MG/DL (ref 0.76–1.27)
EGFR IF AFRICN AM: 103 ML/MIN/1.73
EGFR IF NONAFRICN AM: 89 ML/MIN/1.73
GLOBULIN, TOTAL: 2.8 G/DL (ref 1.5–4.5)
GLUCOSE SERPL-MCNC: 95 MG/DL (ref 65–99)
POTASSIUM SERPL-SCNC: 4.1 MMOL/L (ref 3.5–5.2)
PROT SERPL-MCNC: 7.3 G/DL (ref 6–8.5)
SODIUM SERPL-SCNC: 138 MMOL/L (ref 134–144)
TOTAL CO2: 21 MMOL/L (ref 20–29)

## 2021-06-01 DIAGNOSIS — E78.00 HYPERCHOLESTEREMIA: ICD-10-CM

## 2021-06-01 RX ORDER — ROSUVASTATIN CALCIUM 10 MG/1
TABLET, COATED ORAL
Qty: 90 TABLET | Refills: 0 | Status: SHIPPED | OUTPATIENT
Start: 2021-06-01 | End: 2021-08-09

## 2021-06-29 ENCOUNTER — OFFICE VISIT (OUTPATIENT)
Dept: FAMILY MEDICINE | Facility: CLINIC | Age: 66
End: 2021-06-29

## 2021-06-29 VITALS
HEART RATE: 59 BPM | SYSTOLIC BLOOD PRESSURE: 146 MMHG | TEMPERATURE: 97.8 F | WEIGHT: 179 LBS | OXYGEN SATURATION: 97 % | RESPIRATION RATE: 18 BRPM | BODY MASS INDEX: 27.62 KG/M2 | DIASTOLIC BLOOD PRESSURE: 64 MMHG

## 2021-06-29 DIAGNOSIS — M62.838 MUSCLE SPASM: ICD-10-CM

## 2021-06-29 DIAGNOSIS — M48.062 SPINAL STENOSIS OF LUMBAR REGION WITH NEUROGENIC CLAUDICATION: ICD-10-CM

## 2021-06-29 DIAGNOSIS — R53.83 FATIGUE, UNSPECIFIED TYPE: ICD-10-CM

## 2021-06-29 DIAGNOSIS — I10 BENIGN ESSENTIAL HYPERTENSION: Primary | ICD-10-CM

## 2021-06-29 LAB
% GRANULOCYTES: 71.1 % (ref 42.2–75.2)
HCT VFR BLD AUTO: 36.5 % (ref 39–51)
HEMOGLOBIN: 13 G/DL (ref 13.4–17.5)
LYMPHOCYTES NFR BLD AUTO: 22.9 % (ref 20.5–51.1)
MCH RBC QN AUTO: 31.6 PG (ref 27–31)
MCHC RBC AUTO-ENTMCNC: 35.8 G/DL (ref 33–37)
MCV RBC AUTO: 88.1 FL (ref 80–100)
MONOCYTES NFR BLD AUTO: 6 % (ref 1.7–9.3)
PLATELET # BLD AUTO: 362 K/UL (ref 140–450)
RBC # BLD AUTO: 4.14 X10/CMM (ref 4.2–5.9)
WBC # BLD AUTO: 10.7 X10/CMM (ref 3.8–11)

## 2021-06-29 PROCEDURE — 85025 COMPLETE CBC W/AUTO DIFF WBC: CPT | Performed by: FAMILY MEDICINE

## 2021-06-29 PROCEDURE — 36415 COLL VENOUS BLD VENIPUNCTURE: CPT | Performed by: FAMILY MEDICINE

## 2021-06-29 PROCEDURE — 93050 ART PRESSURE WAVEFORM ANALYS: CPT | Performed by: FAMILY MEDICINE

## 2021-06-29 PROCEDURE — 99214 OFFICE O/P EST MOD 30 MIN: CPT | Performed by: FAMILY MEDICINE

## 2021-06-29 RX ORDER — HYDROMORPHONE HYDROCHLORIDE 4 MG/1
4 TABLET ORAL AT BEDTIME
Qty: 30 TABLET | Refills: 0 | Status: SHIPPED | OUTPATIENT
Start: 2021-06-29 | End: 2021-08-09

## 2021-06-29 RX ORDER — LISINOPRIL 10 MG/1
10 TABLET ORAL DAILY
Qty: 90 TABLET | Refills: 3 | Status: SHIPPED | OUTPATIENT
Start: 2021-06-29 | End: 2022-06-20

## 2021-06-29 RX ORDER — DIAZEPAM 5 MG
TABLET ORAL
Qty: 30 TABLET | Refills: 1 | Status: SHIPPED | OUTPATIENT
Start: 2021-06-29 | End: 2022-02-03

## 2021-06-29 NOTE — PROGRESS NOTES
"The patient complains of lower extremity edema over the past few weeks in both feet.    Mainly noticing swelling around the ankle and leaving \"sock lines\" where the sock elastic.   Symptoms much more noticeable during hot and humid weather and after standing for longer periods of time.   No shortness of breath, no orthopnea, no significant recent sudden changes in weight.   No prior history of CHF or thromboembolic disease.  Is on amlodapine    Trying an inversion table    Back pain continues.  He has been trying to take only a half pill when     Problem(s) Oriented visit      ROS:  General and Resp. completed and negative except as noted above     HISTORY:   reports current alcohol use of about 8.3 standard drinks of alcohol per week.   reports that he quit smoking about 25 years ago. His smoking use included cigarettes. He has a 30.00 pack-year smoking history. He has never used smokeless tobacco.    Past Medical History:   Diagnosis Date     ACP (advance care planning) 3-25-13     GERD (gastroesophageal reflux disease)      HTN, goal below 140/80      Hypercholesterolemia      Hyperlipidemia LDL goal <160 6/15/2011     Psoriatic arthritis (H)      Past Surgical History:   Procedure Laterality Date     ARTHROSCOPY KNEE RT/LT       CARPAL TUNNEL RELEASE RT/LT       FUSION CERVICAL ANTERIOR THREE+ LEVELS       FUSION LUMBAR ANTERIOR ONE LEVEL       ROTATOR CUFF REPAIR RT/LT         EXAM:  BP: 146/64   Pulse: 59    Temp: 97.8    Wt Readings from Last 2 Encounters:   06/29/21 81.2 kg (179 lb)   05/11/21 83.5 kg (184 lb)       BMI= Body mass index is 27.62 kg/m .    EXAM:  APPEARANCE: = Relaxed and in no distress  Conj/Eyelids = noninjected and lids and lashes are without inflammation  PERRLA/Irises = Pupils Round Reactive to Light and Irisis without inflammation  Neck = No anterior or posterior adenopathy appreciated.  Thyroid = Not enlarged and no masses felt  Resp effort = Calm regular breathing  Breath Sounds = " Good air movement with no rales or rhonchi in any lung fields  Heart Rate, Rythym, & sounds (no Murm)  = Regular rate and rythym with no S3, S4, or murmer appreciated.  Carotid Art's = Pulses full and equal and no bruits appreciated  Abdomen = Soft, nontender, no masses, & bowel sounds in all quadrants  Liver/Spleen = Normal span and no splenomegaly noted  Digits and Nails = FROM in all finger joints, no nail dystrophy  Ext (edema) =  2+ and to mid shin  Musculsktl =  Strength and ROM of major joints are within normal limits  SKIN = absent significant rashes or lesions   Recent/Remote Memory = Alert and Oriented x 3  Mood/Affect = Cooperative and interested      Assessment/Plan:  Mane was seen today for swelling and refill request.    Diagnoses and all orders for this visit:    Benign essential hypertension  Reviewed his current HTN management. Discussed our goal for him is a systolic pressure at or below 128 and diastolic pressure at or below 83.  We today managed his prescriptions with refills ensured to ensure availabilty of current medications.  Discussed the importance for aggressive management of HTN to prevent vascular complications later.  Recommended lower fat, lower carbohydrate, and lower sodium (<2000 mg)diet. Required intervals for follow up on HTN, lab studies reviewed.    Strongly recommened he follow his blood pressures outside the clinic to ensure that BPs are remaining within guidelines,.  Instructed to contact me if the readings are not within guidelines on a regular basis so we can adjust treatment as needed.    -     [place] ART PRESS WAVEFORM ANALYS CENTRAL ART PRESSURE  -     CBC with Diff/Plt ([facility])  -     lisinopril (ZESTRIL) 10 MG tablet; Take 1 tablet (10 mg) by mouth daily    Fatigue, unspecified type  No obvious medical cause for the fatigue.    Will check for routine medical cause with the labs as ordered, suspect they will return abnormal.  Will follow up any abnormal labs as indicated.   "D  Nonspecific fatigue may have other causes including stress and problems at home or work, or may be associated with underlying mood disorders in general.    Other causes may include sleep apnea, chronic fatigue syndrome, post viral syndromes, fibromyalgia, connective tissue disease etc.    -     TSH ([facility])    Spinal stenosis of lumbar region with neurogenic claudication  Reviewed his pain history.  Has had long standing pain in his back  Has seen 5 different PTs   caused significant worsening of pain and significant decrease in functionality.   He has been very good in following instructions regarding pain med use, follow up appointments.  There has never been any signs of inappropriate use of these medications or irregularity regarding refills.    -     HYDROmorphone (DILAUDID) 4 MG tablet; Take 1 tablet (4 mg) by mouth At Bedtime    Muscle spasm  Instructed to not mix  -     diazepam (VALIUM) 5 MG tablet; TAKE 0.5 TABLET BY MOUTH AT BEDTIME AS NEEDED FOR severe MUSCLE SPASMS    PDMP Review       Value Time User    State PDMP site checked  Yes 6/29/2021  8:29 AM Filemon Magana MD            COUNSELING:   reports that he quit smoking about 25 years ago. His smoking use included cigarettes. He has a 30.00 pack-year smoking history. He has never used smokeless tobacco.    Estimated body mass index is 27.62 kg/m  as calculated from the following:    Height as of 5/11/21: 1.715 m (5' 7.5\").    Weight as of this encounter: 81.2 kg (179 lb).       Appropriate preventive services were discussed with this patient, including applicable screening as appropriate for cardiovascular disease, diabetes, osteopenia/osteoporosis, and glaucoma.  As appropriate for age/gender, discussed screening for colorectal cancer, prostate cancer, breast cancer, and cervical cancer. Checklist reviewing preventive services available has been given to the patient.    Reviewed patients plan of care and provided an AVS. The Basic Care Plan " (routine screening as documented in Health Maintenance) for Mane meets the Care Plan requirement. This Care Plan has been established and reviewed with the  Patient.      The following health maintenance items are reviewed in Epic and correct as of today:  Health Maintenance   Topic Date Due     ADVANCE CARE PLANNING  03/25/2018     MEDICARE ANNUAL WELLNESS VISIT  11/20/2018     DTAP/TDAP/TD IMMUNIZATION (2 - Td) 08/03/2019     URINE DRUG SCREEN  12/10/2021     FALL RISK ASSESSMENT  12/10/2021     LIPID  01/26/2026     COLORECTAL CANCER SCREENING  04/13/2031     HEPATITIS C SCREENING  Completed     PHQ-2  Completed     INFLUENZA VACCINE  Completed     Pneumococcal Vaccine: 65+ Years  Completed     ZOSTER IMMUNIZATION  Completed     AORTIC ANEURYSM SCREENING (SYSTEM ASSIGNED)  Completed     COVID-19 Vaccine  Completed     IPV IMMUNIZATION  Aged Out     MENINGITIS IMMUNIZATION  Aged Out     HEPATITIS B IMMUNIZATION  Aged Out       Filemon Magana  [facility]  For any issues my office # is [phone]

## 2021-06-30 LAB — TSH BLD-ACNC: 3.15 UIU/ML (ref 0.45–4.5)

## 2021-07-14 ENCOUNTER — OFFICE VISIT (OUTPATIENT)
Dept: FAMILY MEDICINE | Facility: CLINIC | Age: 66
End: 2021-07-14

## 2021-07-14 VITALS
SYSTOLIC BLOOD PRESSURE: 133 MMHG | DIASTOLIC BLOOD PRESSURE: 62 MMHG | RESPIRATION RATE: 16 BRPM | HEIGHT: 68 IN | HEART RATE: 59 BPM | BODY MASS INDEX: 26.67 KG/M2 | WEIGHT: 176 LBS | OXYGEN SATURATION: 97 %

## 2021-07-14 DIAGNOSIS — M25.532 LEFT WRIST PAIN: ICD-10-CM

## 2021-07-14 DIAGNOSIS — L40.50 PSORIATIC ARTHRITIS (H): ICD-10-CM

## 2021-07-14 DIAGNOSIS — I10 BENIGN ESSENTIAL HYPERTENSION: Primary | ICD-10-CM

## 2021-07-14 DIAGNOSIS — M89.8X1 PERISCAPULAR PAIN: ICD-10-CM

## 2021-07-14 LAB — ERYTHROCYTE [SEDIMENTATION RATE] IN BLOOD: 35 MM/HR (ref 0–15)

## 2021-07-14 PROCEDURE — 36415 COLL VENOUS BLD VENIPUNCTURE: CPT | Performed by: FAMILY MEDICINE

## 2021-07-14 PROCEDURE — 85651 RBC SED RATE NONAUTOMATED: CPT | Performed by: FAMILY MEDICINE

## 2021-07-14 PROCEDURE — 99215 OFFICE O/P EST HI 40 MIN: CPT | Performed by: FAMILY MEDICINE

## 2021-07-14 PROCEDURE — 73110 X-RAY EXAM OF WRIST: CPT | Mod: FY | Performed by: FAMILY MEDICINE

## 2021-07-14 PROCEDURE — 93050 ART PRESSURE WAVEFORM ANALYS: CPT | Performed by: FAMILY MEDICINE

## 2021-07-14 RX ORDER — PREDNISONE 5 MG/1
TABLET ORAL
Qty: 42 TABLET | Refills: 0 | Status: SHIPPED | OUTPATIENT
Start: 2021-07-14 | End: 2021-08-09

## 2021-07-14 ASSESSMENT — MIFFLIN-ST. JEOR: SCORE: 1539.89

## 2021-07-15 LAB — CRP SERPL-MCNC: 8 MG/L (ref 0–10)

## 2021-07-29 ENCOUNTER — TRANSFERRED RECORDS (OUTPATIENT)
Dept: FAMILY MEDICINE | Facility: CLINIC | Age: 66
End: 2021-07-29

## 2021-08-09 ENCOUNTER — OFFICE VISIT (OUTPATIENT)
Dept: FAMILY MEDICINE | Facility: CLINIC | Age: 66
End: 2021-08-09

## 2021-08-09 VITALS
WEIGHT: 177.25 LBS | DIASTOLIC BLOOD PRESSURE: 66 MMHG | SYSTOLIC BLOOD PRESSURE: 136 MMHG | HEART RATE: 64 BPM | HEIGHT: 68 IN | BODY MASS INDEX: 26.86 KG/M2 | RESPIRATION RATE: 16 BRPM | OXYGEN SATURATION: 98 %

## 2021-08-09 DIAGNOSIS — M48.062 SPINAL STENOSIS OF LUMBAR REGION WITH NEUROGENIC CLAUDICATION: ICD-10-CM

## 2021-08-09 DIAGNOSIS — I10 BENIGN ESSENTIAL HYPERTENSION: Primary | ICD-10-CM

## 2021-08-09 DIAGNOSIS — M19.032 ARTHRITIS OF WRIST, LEFT: ICD-10-CM

## 2021-08-09 DIAGNOSIS — L40.50 PSORIATIC ARTHRITIS (H): ICD-10-CM

## 2021-08-09 PROCEDURE — 93050 ART PRESSURE WAVEFORM ANALYS: CPT | Performed by: FAMILY MEDICINE

## 2021-08-09 PROCEDURE — 36415 COLL VENOUS BLD VENIPUNCTURE: CPT | Performed by: FAMILY MEDICINE

## 2021-08-09 PROCEDURE — 99214 OFFICE O/P EST MOD 30 MIN: CPT | Performed by: FAMILY MEDICINE

## 2021-08-09 RX ORDER — HYDROMORPHONE HYDROCHLORIDE 4 MG/1
4 TABLET ORAL AT BEDTIME
Qty: 30 TABLET | Refills: 0 | Status: SHIPPED | OUTPATIENT
Start: 2021-08-09 | End: 2021-09-09

## 2021-08-09 ASSESSMENT — MIFFLIN-ST. JEOR: SCORE: 1545.56

## 2021-08-09 NOTE — PROGRESS NOTES
Wrist recheck   A month of prednisone did no good.  If he tries to squeeze the hand pain shoot up his elbow.    The neck pain continues    Psoriatic arthritis  In the wrist is severve.    Chronic Pain Follow-Up    Where in your body do you have pain? Neck and left wrist and lower back  How has your pain affected your ability to work? Takes ibuprofen during the day and then takes a pill when he gets home.    Which of these pain treatments have you tried since your last clinic visit? Other: prednisone burst and trying to get infusions from rheum...  How well are you sleeping? Fair  How has your mood been since your last visit? Slightly worse  Have you had a significant life event? No  Other aggravating factors: wrist exacerbation is quite severe.  Taking medication as directed? Yes    PHQ-9 SCORE 11/22/2016 6/1/2018 3/28/2019   PHQ-9 Total Score 3 2 5     TIFFANIE-7 SCORE 11/22/2016 6/1/2018 3/28/2019   Total Score 3 1 1     No flowsheet data found.  Encounter-Level CSA - 07/12/2018:    Controlled Substance Agreement - Scan on 7/12/2018  3:40 PM: CONTROLLED SUBSTANCE AGREEMENT 7/12/18     Encounter-Level CSA - 04/27/2017:    Controlled Substance Agreement - Scan on 4/27/2017  3:48 PM: CONTROLLED SUBSTANCE AGREEMENT 4/27/17     Patient-Level CSA:    Controlled Substance Agreement - Opioid - Scan on 12/10/2020  2:07 PM: CONTROLLED SUBSTANCE AGREEMENT FORM 12/10/20         Problem(s) Oriented visit      ROS:  General and Resp. completed and negative except as noted above     HISTORY:   reports current alcohol use of about 8.3 standard drinks of alcohol per week.   reports that he quit smoking about 25 years ago. His smoking use included cigarettes. He has a 30.00 pack-year smoking history. He has never used smokeless tobacco.    Past Medical History:   Diagnosis Date     ACP (advance care planning) 3-25-13     GERD (gastroesophageal reflux disease)      HTN, goal below 140/80      Hypercholesterolemia      Hyperlipidemia LDL  goal <160 6/15/2011     Psoriatic arthritis (H)      Past Surgical History:   Procedure Laterality Date     ARTHROSCOPY KNEE RT/LT       CARPAL TUNNEL RELEASE RT/LT       FUSION CERVICAL ANTERIOR THREE+ LEVELS       FUSION LUMBAR ANTERIOR ONE LEVEL       ROTATOR CUFF REPAIR RT/LT         EXAM:  BP: 136/66   Pulse: 64    Temp: Data Unavailable    Wt Readings from Last 2 Encounters:   08/09/21 80.4 kg (177 lb 4 oz)   07/14/21 79.8 kg (176 lb)       BMI= Body mass index is 27.35 kg/m .    EXAM:  APPEARANCE: = alert and mild distress  Resp effort = Calm regular breathing  Ext (edema) = No pretibial edema noted or elsewhere  Musculsktl: extremities normal- no gross deformities noted and arthritic changes of the of the left wrist greatly decreased ROM and sore and warm  Recent/Remote Memory = Alert and Oriented x 3      Assessment/Plan:  Mane was seen today for refill request.    Diagnoses and all orders for this visit:    Benign essential hypertension  Reviewed his current HTN management. Discussed our goal for him is a systolic pressure at or below 128 and diastolic pressure at or below 83.  We today managed his prescriptions with refills ensured to ensure availabilty of current medications.  Discussed the importance for aggressive management of HTN to prevent vascular complications later.  Recommended lower fat, lower carbohydrate, and lower sodium (<2000 mg)diet. Required intervals for follow up on HTN, lab studies reviewed.    Strongly recommened he follow his blood pressures outside the clinic to ensure that BPs are remaining within guidelines,.  Instructed to contact me if the readings are not within guidelines on a regular basis so we can adjust treatment as needed.    -     [place] ART PRESS WAVEFORM ANALYS CENTRAL ART PRESSURE    Spinal stenosis of lumbar region with neurogenic claudication  -     HYDROmorphone (DILAUDID) 4 MG tablet; Take 1 tablet (4 mg) by mouth At Bedtime    Arthritis of wrist, left  Very  "significant.  Explained my concern of progression and need for Rheum   -     Lyme  Total Ab Test/Reflex ([facility])  from  Psoriatic arthritis (H)  He will follow up with Dr. Douglas this week      COUNSELING:   reports that he quit smoking about 25 years ago. His smoking use included cigarettes. He has a 30.00 pack-year smoking history. He has never used smokeless tobacco.    Estimated body mass index is 27.35 kg/m  as calculated from the following:    Height as of this encounter: 1.715 m (5' 7.5\").    Weight as of this encounter: 80.4 kg (177 lb 4 oz).       Appropriate preventive services were discussed with this patient, including applicable screening as appropriate for cardiovascular disease, diabetes, osteopenia/osteoporosis, and glaucoma.  As appropriate for age/gender, discussed screening for colorectal cancer, prostate cancer, breast cancer, and cervical cancer. Checklist reviewing preventive services available has been given to the patient.    Reviewed patients plan of care and provided an AVS. The Basic Care Plan (routine screening as documented in Health Maintenance) for Mane meets the Care Plan requirement. This Care Plan has been established and reviewed with the  Patient.      The following health maintenance items are reviewed in Epic and correct as of today:  Health Maintenance   Topic Date Due     ADVANCE CARE PLANNING  03/25/2018     MEDICARE ANNUAL WELLNESS VISIT  11/20/2018     DTAP/TDAP/TD IMMUNIZATION (2 - Td or Tdap) 08/03/2019     INFLUENZA VACCINE (1) 09/01/2021     URINE DRUG SCREEN  12/10/2021     FALL RISK ASSESSMENT  12/10/2021     LIPID  01/26/2026     COLORECTAL CANCER SCREENING  04/13/2031     HEPATITIS C SCREENING  Completed     PHQ-2  Completed     Pneumococcal Vaccine: 65+ Years  Completed     ZOSTER IMMUNIZATION  Completed     AORTIC ANEURYSM SCREENING (SYSTEM ASSIGNED)  Completed     COVID-19 Vaccine  Completed     IPV IMMUNIZATION  Aged Out     MENINGITIS IMMUNIZATION  Aged Out "     HEPATITIS B IMMUNIZATION  Aged Out       Filemon Magana  [facility]  For any issues my office # is [phone]            .

## 2021-08-09 NOTE — LETTER
[facility] [address] [place]  [address]  Phone: [phone]    August 12, 2021      Mane Chawla  [facility] [facility] [address]              Dear Mane,     I am writing to report that your included test results are within expected ranges. I do not suggest that we make any changes at this time.     Filemon Magana M.D./jany       Results for orders placed or performed in visit on 08/09/21   Lyme  Total Ab Test/Reflex ([facility])     Status: None   Result Value Ref Range    Lyme IgG/IgM Ab <0.91 0.00 - 0.90 ISR    Narrative    Performed at:  01 - [facility] [address], [place], [place]  [id number]  : MERVAT Sebastian MD, Phone:  [phone]

## 2021-08-11 LAB — LYME IGG/IGM AB: <0.91 ISR (ref 0–0.9)

## 2021-08-24 ENCOUNTER — TRANSFERRED RECORDS (OUTPATIENT)
Dept: FAMILY MEDICINE | Facility: CLINIC | Age: 66
End: 2021-08-24

## 2021-08-26 DIAGNOSIS — I10 BENIGN ESSENTIAL HYPERTENSION: ICD-10-CM

## 2021-08-26 DIAGNOSIS — E78.00 HYPERCHOLESTEREMIA: ICD-10-CM

## 2021-08-26 RX ORDER — ROSUVASTATIN CALCIUM 10 MG/1
TABLET, COATED ORAL
Qty: 90 TABLET | Refills: 0 | Status: SHIPPED | OUTPATIENT
Start: 2021-08-26 | End: 2021-11-17

## 2021-08-26 RX ORDER — HYDROCHLOROTHIAZIDE 50 MG/1
TABLET ORAL
Qty: 90 TABLET | Refills: 3 | Status: SHIPPED | OUTPATIENT
Start: 2021-08-26 | End: 2022-09-13

## 2021-09-01 ENCOUNTER — TRANSFERRED RECORDS (OUTPATIENT)
Dept: FAMILY MEDICINE | Facility: CLINIC | Age: 66
End: 2021-09-01

## 2021-09-04 ENCOUNTER — HEALTH MAINTENANCE LETTER (OUTPATIENT)
Age: 66
End: 2021-09-04

## 2021-09-09 DIAGNOSIS — M48.062 SPINAL STENOSIS OF LUMBAR REGION WITH NEUROGENIC CLAUDICATION: ICD-10-CM

## 2021-09-09 RX ORDER — HYDROMORPHONE HYDROCHLORIDE 4 MG/1
4 TABLET ORAL AT BEDTIME
Qty: 30 TABLET | Refills: 0 | Status: SHIPPED | OUTPATIENT
Start: 2021-09-09 | End: 2021-10-21

## 2021-09-09 NOTE — TELEPHONE ENCOUNTER
Last office visit 8/9/21, CSA signed 12/20/20 last tox screen 12/10/20. No future office visit scheduled at this time. [place]  checked, fill history below. Routed to Dr. Magana for review. Becky Williamson

## 2021-09-28 ENCOUNTER — OFFICE VISIT (OUTPATIENT)
Dept: FAMILY MEDICINE | Facility: CLINIC | Age: 66
End: 2021-09-28

## 2021-09-28 VITALS
RESPIRATION RATE: 18 BRPM | HEART RATE: 82 BPM | DIASTOLIC BLOOD PRESSURE: 76 MMHG | OXYGEN SATURATION: 98 % | SYSTOLIC BLOOD PRESSURE: 143 MMHG | WEIGHT: 173 LBS | BODY MASS INDEX: 26.22 KG/M2 | HEIGHT: 68 IN

## 2021-09-28 DIAGNOSIS — M54.12 CERVICAL RADICULOPATHY: ICD-10-CM

## 2021-09-28 DIAGNOSIS — I10 BENIGN ESSENTIAL HYPERTENSION: Primary | ICD-10-CM

## 2021-09-28 DIAGNOSIS — N48.1 BALANITIS: ICD-10-CM

## 2021-09-28 PROCEDURE — 93050 ART PRESSURE WAVEFORM ANALYS: CPT | Performed by: FAMILY MEDICINE

## 2021-09-28 PROCEDURE — 99214 OFFICE O/P EST MOD 30 MIN: CPT | Performed by: FAMILY MEDICINE

## 2021-09-28 RX ORDER — NYSTATIN AND TRIAMCINOLONE ACETONIDE 100000; 1 [USP'U]/G; MG/G
CREAM TOPICAL 2 TIMES DAILY PRN
Qty: 15 G | Refills: 4 | Status: SHIPPED | OUTPATIENT
Start: 2021-09-28 | End: 2022-10-06

## 2021-09-28 ASSESSMENT — MIFFLIN-ST. JEOR: SCORE: 1526.28

## 2021-10-04 ENCOUNTER — TRANSFERRED RECORDS (OUTPATIENT)
Dept: FAMILY MEDICINE | Facility: CLINIC | Age: 66
End: 2021-10-04

## 2021-10-08 ENCOUNTER — TELEPHONE (OUTPATIENT)
Dept: FAMILY MEDICINE | Facility: CLINIC | Age: 66
End: 2021-10-08

## 2021-10-08 DIAGNOSIS — M54.12 CERVICAL RADICULOPATHY: Primary | ICD-10-CM

## 2021-10-08 NOTE — CONFIDENTIAL NOTE
Dr. Magana reviewed patient's MRI cervical spine and recommends cervical PARAMJIT or revisit with his ortho provider.     Left message on patient's mobile # voicemail with these recommendations and to call nurse to let know how would like to proceed.  Elizabeth Morris RN

## 2021-10-21 ENCOUNTER — OFFICE VISIT (OUTPATIENT)
Dept: FAMILY MEDICINE | Facility: CLINIC | Age: 66
End: 2021-10-21

## 2021-10-21 VITALS
WEIGHT: 172.6 LBS | OXYGEN SATURATION: 97 % | HEART RATE: 63 BPM | SYSTOLIC BLOOD PRESSURE: 122 MMHG | BODY MASS INDEX: 26.16 KG/M2 | DIASTOLIC BLOOD PRESSURE: 66 MMHG | RESPIRATION RATE: 18 BRPM | HEIGHT: 68 IN

## 2021-10-21 DIAGNOSIS — M48.062 SPINAL STENOSIS OF LUMBAR REGION WITH NEUROGENIC CLAUDICATION: ICD-10-CM

## 2021-10-21 DIAGNOSIS — Z23 NEEDS FLU SHOT: Primary | ICD-10-CM

## 2021-10-21 PROCEDURE — 90662 IIV NO PRSV INCREASED AG IM: CPT | Performed by: FAMILY MEDICINE

## 2021-10-21 PROCEDURE — G0008 ADMIN INFLUENZA VIRUS VAC: HCPCS | Mod: 59 | Performed by: FAMILY MEDICINE

## 2021-10-21 PROCEDURE — 99214 OFFICE O/P EST MOD 30 MIN: CPT | Mod: 25 | Performed by: FAMILY MEDICINE

## 2021-10-21 RX ORDER — HYDROMORPHONE HYDROCHLORIDE 4 MG/1
4 TABLET ORAL AT BEDTIME
Qty: 30 TABLET | Refills: 0 | Status: SHIPPED | OUTPATIENT
Start: 2021-10-21 | End: 2021-11-18

## 2021-10-21 ASSESSMENT — MIFFLIN-ST. JEOR: SCORE: 1524.47

## 2021-10-21 NOTE — PROGRESS NOTES
Problem(s) Oriented visit        SUBJECTIVE:                                                    Mane Chawla is a 65 year old male who presents to clinic today for the following health issues :    1. Needs flu shot    - ADMIN INFLUENZA VIRUS VAC  - [place] FLU VACCINE, INCREASED ANTIGEN, PRESV FREE    2. Spinal stenosis of lumbar region with neurogenic claudication  Chronic Pain Follow-Up    Where in your body do you have pain? Neck and lower back  How has your pain affected your ability to work? 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 Cold, Heat, Physical Therapy and Rest  How well are you sleeping? Fair  How has your mood been since your last visit? About the same  Have you had a significant life event? No  Other aggravating factors: none  Taking medication as directed? Yes  Getting infusions for psoriatic arthritis    PHQ-9 SCORE 11/22/2016 6/1/2018 3/28/2019   PHQ-9 Total Score 3 2 5     TIFFANIE-7 SCORE 11/22/2016 6/1/2018 3/28/2019   Total Score 3 1 1     No flowsheet data found.  Encounter-Level CSA - 07/12/2018:    Controlled Substance Agreement - Scan on 7/12/2018  3:40 PM: CONTROLLED SUBSTANCE AGREEMENT 7/12/18     Encounter-Level CSA - 04/27/2017:    Controlled Substance Agreement - Scan on 4/27/2017  3:48 PM: CONTROLLED SUBSTANCE AGREEMENT 4/27/17     Patient-Level CSA:    Controlled Substance Agreement - Opioid - Scan on 12/10/2020  2:07 PM: CONTROLLED SUBSTANCE AGREEMENT FORM 12/10/20         - HYDROmorphone (DILAUDID) 4 MG tablet; Take 1 tablet (4 mg) by mouth At Bedtime  Dispense: 30 tablet; Refill: 0       Problem list, Medication list, Allergies, and Medical/Social/Surgical histories reviewed in [facility] and updated as appropriate.   Additional history: as documented    ROS:  General and Resp. completed and negative except as noted above Resp:  NEGATIVE for significant  "cough or SOB    Histories:   Patient Active Problem List   Diagnosis     Psoriatic arthritis (H)     Hyperlipidemia LDL goal <160     ACP (advance care planning)     Health Care Home     Midline low back pain without sciatica     Benign essential hypertension     Encounter for long-term (current) use of medications     Nonrheumatic aortic sclerosis     Spinal stenosis of lumbar region with neurogenic claudication     Atherosclerosis of aorta (H)     Past Surgical History:   Procedure Laterality Date     ARTHROSCOPY KNEE RT/LT       CARPAL TUNNEL RELEASE RT/LT       FUSION CERVICAL ANTERIOR THREE+ LEVELS       FUSION LUMBAR ANTERIOR ONE LEVEL       ROTATOR CUFF REPAIR RT/LT         Social History     Tobacco Use     Smoking status: Former Smoker     Packs/day: 1.00     Years: 30.00     Pack years: 30.00     Types: Cigarettes     Quit date: 3/21/1996     Years since quittin.6     Smokeless tobacco: Never Used   Substance Use Topics     Alcohol use: Yes     Alcohol/week: 8.3 standard drinks     Types: 10 Standard drinks or equivalent per week     Family History   Problem Relation Age of Onset     Cerebrovascular Disease Father      Alzheimer Disease Father      Respiratory Mother            OBJECTIVE:                                                    /66   Pulse 63   Resp 18   Ht 1.715 m (5' 7.5\")   Wt 78.3 kg (172 lb 9.6 oz)   SpO2 97%   BMI 26.63 kg/m    Body mass index is 26.63 kg/m .   APPEARANCE: = Relaxed and in no distress     ASSESSMENT/PLAN:                                                        Mane was seen today for recheck medication and recheck.    Diagnoses and all orders for this visit:    Needs flu shot  -     ADMIN INFLUENZA VIRUS VAC  -     [place] FLU VACCINE, INCREASED ANTIGEN, PRESV FREE    Spinal stenosis of lumbar region with neurogenic claudication  -     HYDROmorphone (DILAUDID) 4 MG tablet; Take 1 tablet (4 mg) by mouth At Bedtime    Discussed side effects associated with " "narcotic pain meds including GI disturbances (such as diarrhea, nausea, vomiting, constipation, etc.), drowsiness, decreased cognition, narcotic withdrawal, and narcotic addiction/dependence.  Told the patient never to drink alcohol while taking this medication and not to drive or operate dangerous machinery while taking it.  Also discussed in specific detail my expectation regarding responsible use of narcotic and the implied \"contract\" with the patient that this type of medication will be used properly and exactly as instructed, and that any abnormal behaviors associated with the use of this medication will cause me to stop prescribing these medications at any time.        Work on weight loss  Regular exercise  There are no Patient Instructions on file for this visit.    The following health maintenance items are reviewed in Epic and correct as of today:  Health Maintenance   Topic Date Due     ADVANCE CARE PLANNING  03/25/2018     MEDICARE ANNUAL WELLNESS VISIT  11/20/2018     DTAP/TDAP/TD IMMUNIZATION (2 - Td or Tdap) 08/03/2019     URINE DRUG SCREEN  12/10/2021     FALL RISK ASSESSMENT  12/10/2021     LIPID  01/26/2026     COLORECTAL CANCER SCREENING  04/13/2031     HEPATITIS C SCREENING  Completed     PHQ-2  Completed     INFLUENZA VACCINE  Completed     Pneumococcal Vaccine: 65+ Years  Completed     ZOSTER IMMUNIZATION  Completed     AORTIC ANEURYSM SCREENING (SYSTEM ASSIGNED)  Completed     COVID-19 Vaccine  Completed     IPV IMMUNIZATION  Aged Out     MENINGITIS IMMUNIZATION  Aged Out     HEPATITIS B IMMUNIZATION  Aged Out       Filemon Magana MD  [facility]  [phone]    For any issues my office # is [phone]        "

## 2021-10-26 ENCOUNTER — TELEPHONE (OUTPATIENT)
Dept: FAMILY MEDICINE | Facility: CLINIC | Age: 66
End: 2021-10-26

## 2021-10-26 DIAGNOSIS — G45.9 TIA (TRANSIENT ISCHEMIC ATTACK): Primary | ICD-10-CM

## 2021-10-26 NOTE — TELEPHONE ENCOUNTER
Patient calls reporting since his visit with Dr. Magana 9/28/21 has had 4 additional episodes of sudden onset of arms feeling like lead/heavy/weak. For approx 15 minutes is hard to move them, then for 1-2 hours feels weak. Initial episode occurred when looking up at roof and blocking sun with his hands/arms, but since then had an episode just sitting at rest at a counter and one on 10/22 when sitting at rest in his car -- both with head/neck in neutral position.   MRI cervical spine done 10/4/21 - C3-6 fusion; mod to severe foraminal stenosis and arthropathy at other levels.     Plan: Dr. Magana recommends MRA neck with and without contrast to eval carotid and vertebral arteries. Patient agrees. Referral faxed to [facility]. Patient will follow up to schedule procedure.  Elizabeth Morris RN

## 2021-10-28 ENCOUNTER — TRANSFERRED RECORDS (OUTPATIENT)
Dept: FAMILY MEDICINE | Facility: CLINIC | Age: 66
End: 2021-10-28

## 2021-11-17 DIAGNOSIS — I10 BENIGN ESSENTIAL HYPERTENSION: ICD-10-CM

## 2021-11-17 DIAGNOSIS — E78.00 HYPERCHOLESTEREMIA: ICD-10-CM

## 2021-11-17 RX ORDER — METOPROLOL SUCCINATE 50 MG/1
TABLET, EXTENDED RELEASE ORAL
Qty: 90 TABLET | Refills: 3 | Status: SHIPPED | OUTPATIENT
Start: 2021-11-17 | End: 2023-01-09

## 2021-11-17 RX ORDER — ROSUVASTATIN CALCIUM 10 MG/1
TABLET, COATED ORAL
Qty: 90 TABLET | Refills: 0 | Status: SHIPPED | OUTPATIENT
Start: 2021-11-17 | End: 2023-07-10

## 2021-11-18 DIAGNOSIS — M48.062 SPINAL STENOSIS OF LUMBAR REGION WITH NEUROGENIC CLAUDICATION: ICD-10-CM

## 2021-11-18 RX ORDER — HYDROMORPHONE HYDROCHLORIDE 4 MG/1
4 TABLET ORAL AT BEDTIME
Qty: 30 TABLET | Refills: 0 | Status: SHIPPED | OUTPATIENT
Start: 2021-11-22 | End: 2022-01-04

## 2021-11-18 RX ORDER — TADALAFIL 5 MG/1
5 TABLET ORAL EVERY 24 HOURS
Qty: 15 TABLET | Refills: 3 | Status: SHIPPED | OUTPATIENT
Start: 2021-11-18 | End: 2023-07-10

## 2021-11-18 NOTE — TELEPHONE ENCOUNTER
Patient called clinic requesting refill of dilaudid and Cialis. Last office visit 10/21/21, contract signed 12/20/21 and tox screen done 12/10/2020. [place]  checked:      Refills entered and routed to Dr. Magana for review. Becky Williamson

## 2021-12-27 ENCOUNTER — MYC MEDICAL ADVICE (OUTPATIENT)
Dept: FAMILY MEDICINE | Facility: CLINIC | Age: 66
End: 2021-12-27

## 2021-12-27 NOTE — PROGRESS NOTES
Patient was oriented to the room. Call light in reach, instructed on use. Bed in lowest position and wheels are locked. Side rails are up for safety. Patient instructed not to get up without assistance.     Problem(s) Oriented visit      Subjective:  CC: nout for 1.5 hours yesterday, arms felt heavy )    1. Benign essential hypertension  Known issue that I take into account for his medical decisions, no current exacerbations or new concerns.     - [place] ART PRESS WAVEFORM ANALYS CENTRAL ART PRESSURE    2. Balanitis  Needs refill  - nystatin-triamcinolone (MYCOLOG II) 051475-6.1 UNIT/GM-% external cream; Apply topically 2 times daily as needed (skin irritation)  Dispense: 15 g; Refill: 4    3. Cervical radiculopathy  Alexandro yesterday was looking up at a groove as he is want to doing his work and had to use his hands to block the son and so both of his arms were up around his forehead as he studied a roof he then suddenly had an onset of feeling very weak in his arms his arms fell to the side and he felt quite tired and like a lack of energy he did not pass out he did not have strokelike symptoms he simply felt odd and tired and went back to his office and sat for a couple of hours until he recovered.  He had no heart palpitations or other symptoms there is no seizure activity.  He did last week have a less severe but similar episode in today he is here to see if we can figure out what is going  - Referral to [facility]; Future       ROS:  General and CV completed and negative except as noted above     HISTORY:   reports current alcohol use of about 8.3 standard drinks of alcohol per week.   reports that he quit smoking about 25 years ago. His smoking use included cigarettes. He has a 30.00 pack-year smoking history. He has never used smokeless tobacco.    Past Medical History:   Diagnosis Date     ACP (advance care planning) 3-25-13     GERD (gastroesophageal reflux disease)      HTN, goal below 140/80      Hypercholesterolemia      Hyperlipidemia LDL goal <160 6/15/2011     Psoriatic arthritis (H)      Past Surgical History:   Procedure Laterality Date     ARTHROSCOPY KNEE RT/LT       CARPAL TUNNEL RELEASE RT/LT        FUSION CERVICAL ANTERIOR THREE+ LEVELS       FUSION LUMBAR ANTERIOR ONE LEVEL       ROTATOR CUFF REPAIR RT/LT         EXAM:  BP: 143/76[AtCor[   Pulse: 82    Temp: Data Unavailable    Wt Readings from Last 2 Encounters:   09/28/21 78.5 kg (173 lb)   08/09/21 80.4 kg (177 lb 4 oz)       BMI= Body mass index is 26.7 kg/m .    EXAM:  APPEARANCE: = Relaxed and in no distress  Conj/Eyelids = noninjected and lids and lashes are without inflammation  PERRLA/Irises = Pupils Round Reactive to Light and Irisis without inflammation  Neck = No anterior or posterior adenopathy appreciated.  Thyroid = Not enlarged and no masses felt  Resp effort = Calm regular breathing  Breath Sounds = Good air movement with no rales or rhonchi in any lung fields  Heart Rate, Rythym, & sounds (no Murm)  = Regular rate and rythym with no S3, S4, or murmer appreciated.  Carotid Art's = Pulses full and equal and no bruits appreciated  Abdomen = Soft, nontender, no masses, & bowel sounds in all quadrants  Liver/Spleen = Normal span and no splenomegaly noted  Digits and Nails = FROM in all finger joints, no nail dystrophy  Ext (edema) = No pretibial edema noted or elsewhere  Musculsktl =  Strength and ROM of major joints are within normal limits  SKIN = absent significant rashes or lesions   Recent/Remote Memory = Alert and Oriented x 3  Mood/Affect = Cooperative and interested      Assessment/Plan:  Mane was seen today for syncope.    Diagnoses and all orders for this visit:    Benign essential hypertension  -     [place] ART PRESS WAVEFORM ANALYS CENTRAL ART PRESSURE    Balanitis  -     nystatin-triamcinolone (MYCOLOG II) 048147-5.1 UNIT/GM-% external cream; Apply topically 2 times daily as needed (skin irritation)    Cervical radiculopathy  -     Referral to [facility]; Future        COUNSELING:   reports that he quit smoking about 25 years ago. His smoking use included cigarettes. He has a 30.00 pack-year smoking history. He has never used  "smokeless tobacco.    Estimated body mass index is 26.7 kg/m  as calculated from the following:    Height as of this encounter: 1.715 m (5' 7.5\").    Weight as of this encounter: 78.5 kg (173 lb).       Appropriate preventive services were discussed with this patient, including applicable screening as appropriate for cardiovascular disease, diabetes, osteopenia/osteoporosis, and glaucoma.  As appropriate for age/gender, discussed screening for colorectal cancer, prostate cancer, breast cancer, and cervical cancer. Checklist reviewing preventive services available has been given to the patient.    Reviewed patients plan of care and provided an AVS. The Basic Care Plan (routine screening as documented in Health Maintenance) for Mane meets the Care Plan requirement. This Care Plan has been established and reviewed with the  Patient.      The following health maintenance items are reviewed in Epic and correct as of today:  Health Maintenance   Topic Date Due     ADVANCE CARE PLANNING  03/25/2018     MEDICARE ANNUAL WELLNESS VISIT  11/20/2018     DTAP/TDAP/TD IMMUNIZATION (2 - Td or Tdap) 08/03/2019     INFLUENZA VACCINE (1) 09/01/2021     URINE DRUG SCREEN  12/10/2021     FALL RISK ASSESSMENT  12/10/2021     LIPID  01/26/2026     COLORECTAL CANCER SCREENING  04/13/2031     HEPATITIS C SCREENING  Completed     PHQ-2  Completed     Pneumococcal Vaccine: 65+ Years  Completed     ZOSTER IMMUNIZATION  Completed     AORTIC ANEURYSM SCREENING (SYSTEM ASSIGNED)  Completed     COVID-19 Vaccine  Completed     IPV IMMUNIZATION  Aged Out     MENINGITIS IMMUNIZATION  Aged Out     HEPATITIS B IMMUNIZATION  Aged Out       Filemon Magana MD  [facility]  [phone]       For any issues my office # is [phone]        "

## 2022-01-03 NOTE — TELEPHONE ENCOUNTER
"Pt due for blood pressure over the phone.   130/75 see screenings under \"pt reported vitals\" signs.  Petty Daniel, RENAN    "

## 2022-01-04 ENCOUNTER — OFFICE VISIT (OUTPATIENT)
Dept: FAMILY MEDICINE | Facility: CLINIC | Age: 67
End: 2022-01-04

## 2022-01-04 VITALS
SYSTOLIC BLOOD PRESSURE: 128 MMHG | HEART RATE: 60 BPM | BODY MASS INDEX: 26.49 KG/M2 | OXYGEN SATURATION: 97 % | DIASTOLIC BLOOD PRESSURE: 71 MMHG | RESPIRATION RATE: 18 BRPM | HEIGHT: 68 IN | WEIGHT: 174.8 LBS

## 2022-01-04 DIAGNOSIS — I70.0 ATHEROSCLEROSIS OF AORTA (H): ICD-10-CM

## 2022-01-04 DIAGNOSIS — Z79.899 ENCOUNTER FOR LONG-TERM (CURRENT) USE OF MEDICATIONS: Primary | ICD-10-CM

## 2022-01-04 DIAGNOSIS — M48.062 SPINAL STENOSIS OF LUMBAR REGION WITH NEUROGENIC CLAUDICATION: ICD-10-CM

## 2022-01-04 DIAGNOSIS — L40.50 PSORIATIC ARTHRITIS (H): ICD-10-CM

## 2022-01-04 LAB
AMPHETAMINES (AMP) UR: NEGATIVE
BARBITURATES (BAR) UR: NEGATIVE
BENZODIAZEPINES (BZO) UR: NORMAL
BUPRENORPHINE (BUP) UR: NEGATIVE
CANNABINOIDS (THC) UR: NEGATIVE
COCAINE (COC) UR: NEGATIVE
MDMA UR: NEGATIVE
METHADONE (MTD) UR: NEGATIVE
METHAMPHETAMINE (METHA) UR: NEGATIVE
MORPH/OPIATES (MOP) UR: NEGATIVE
OXYCODONE (OXYCO) UR: NEGATIVE
PCP UR: NEGATIVE
SPECIMEN VALIDITY INTERNAL QC UR: NORMAL
SPECIMEN VALIDITY TEMPERATURE UR: NORMAL
SPECIMEN VALIDITY UR CREATININE: NORMAL MG/DL (ref 20–200)
SPECIMEN VALIDITY UR PH: 5 (ref 4–9)
SPECIMEN VALIDITY UR SPECIFIC GRAVITY: 1.02 (ref 1–1.02)

## 2022-01-04 PROCEDURE — 99214 OFFICE O/P EST MOD 30 MIN: CPT | Performed by: FAMILY MEDICINE

## 2022-01-04 PROCEDURE — 80306 DRUG TEST PRSMV INSTRMNT: CPT | Performed by: FAMILY MEDICINE

## 2022-01-04 RX ORDER — HYDROMORPHONE HYDROCHLORIDE 2 MG/1
2 TABLET ORAL AT BEDTIME
Qty: 30 TABLET | Refills: 0 | Status: SHIPPED | OUTPATIENT
Start: 2022-01-04 | End: 2022-02-03

## 2022-01-04 ASSESSMENT — ANXIETY QUESTIONNAIRES
1. FEELING NERVOUS, ANXIOUS, OR ON EDGE: NOT AT ALL
2. NOT BEING ABLE TO STOP OR CONTROL WORRYING: NOT AT ALL
7. FEELING AFRAID AS IF SOMETHING AWFUL MIGHT HAPPEN: NOT AT ALL
3. WORRYING TOO MUCH ABOUT DIFFERENT THINGS: NOT AT ALL
6. BECOMING EASILY ANNOYED OR IRRITABLE: SEVERAL DAYS
GAD7 TOTAL SCORE: 2
5. BEING SO RESTLESS THAT IT IS HARD TO SIT STILL: NOT AT ALL
IF YOU CHECKED OFF ANY PROBLEMS ON THIS QUESTIONNAIRE, HOW DIFFICULT HAVE THESE PROBLEMS MADE IT FOR YOU TO DO YOUR WORK, TAKE CARE OF THINGS AT HOME, OR GET ALONG WITH OTHER PEOPLE: NOT DIFFICULT AT ALL

## 2022-01-04 ASSESSMENT — PATIENT HEALTH QUESTIONNAIRE - PHQ9
5. POOR APPETITE OR OVEREATING: SEVERAL DAYS
SUM OF ALL RESPONSES TO PHQ QUESTIONS 1-9: 6

## 2022-01-04 ASSESSMENT — MIFFLIN-ST. JEOR: SCORE: 1529.45

## 2022-01-04 NOTE — LETTER
Opioid / Opioid Plus Controlled Substance Agreement    This is an agreement between you and your provider about the safe and appropriate use of controlled substance/opioids prescribed by your care team. Controlled substances are medicines that can cause physical and mental dependence (abuse).    There are strict laws about having and using these medicines. We here at [facility] are committing to working with you in your efforts to get better. To support you in this work, we ll help you schedule regular office appointments for medicine refills. If we must cancel or change your appointment for any reason, we ll make sure you have enough medicine to last until your next appointment.     As a Provider, I will:    Listen carefully to your concerns and treat you with respect.     Recommend a treatment plan that I believe is in your best interest. This plan may involve therapies other than opioid pain medication.     Talk with you often about the possible benefits, and the risk of harm of any medicine that we prescribe for you.     Provide a plan on how to taper (discontinue or go off) using this medicine if the decision is made to stop its use.    As a Patient, I understand that opioid(s):     Are a controlled substance prescribed by my care team to help me function or work and manage my condition(s).     Are strong medicines and can cause serious side effects such as:    Drowsiness, which can seriously affect my driving ability    A lower breathing rate, enough to cause death    Harm to my thinking ability     Depression     Abuse of and addiction to this medicine    Need to be taken exactly as prescribed. Combining opioids with certain medicines or chemicals (such as illegal drugs, sedatives, sleeping pills, and benzodiazepines) can be dangerous or even fatal. If I stop opioids suddenly, I may have severe withdrawal symptoms.    Do not work for all types of pain nor for all patients. If they re not helpful, I may  be asked to stop them.        The risks, benefits and side effects of these medicine(s) were explained to me. I agree that:  1. I will take part in other treatments as advised by my care team. This may be psychiatry or counseling, physical therapy, behavioral therapy, group treatment or a referral to a specialist.     2. I will keep all my appointments. I understand that this is part of the monitoring of opioids. My care team may require an office visit for EVERY opioid/controlled substance refill. If I miss appointments or don t follow instructions, my care team may stop my medicine.    3. I will take my medicines as prescribed. I will not change the dose or schedule unless my care team tells me to. There will be no refills if I run out early.     4. I may be asked to come to the clinic and complete a urine drug test or complete a pill count at any time. If I don t give a urine sample or participate in a pill count, the care team may stop my medicine.    5. I will only receive prescriptions from this clinic for chronic pain. If I am treated by another provider for acute pain issues, I will tell them that I am taking opioid pain medication for chronic pain and that I have a treatment agreement with this provider. I will inform my [facility] care team within one business day if I am given a prescription for any pain medication by another healthcare provider. My [facility] care team can contact other providers and pharmacists about my use of any medicines.    6. It is up to me to make sure that I don t run out of my medicines on weekends or holidays. If my care team is willing to refill my opioid prescription without a visit, I must request refills only during office hours. Refills may take up to 3 business days to process. I will use one pharmacy to fill all my opioid and other controlled substance prescriptions. I will notify the clinic about any changes to my insurance or medication  availability.    7. I am responsible for my prescriptions. If the medicine/prescription is lost, stolen or destroyed, it will not be replaced. I also agree not to share controlled substance medicines with anyone.    8. I am aware I should not use any illegal or recreational drugs. I agree not to drink alcohol unless my care team says I can.       9. If I enroll in the [place] Medical Cannabis program, I will tell my care team prior to my next refill.     10. I will tell my care team right away if I become pregnant, have a new medical problem treated outside of my regular clinic, or have a change in my medications.    11. I understand that this medicine can affect my thinking, judgment and reaction time. Alcohol and drugs affect the brain and body, which can affect the safety of my driving. Being under the influence of alcohol or drugs can affect my decision-making, behaviors, personal safety, and the safety of others. Driving while impaired (DWI) can occur if a person is driving, operating, or in physical control of a car, motorcycle, boat, snowmobile, ATV, motorbike, off-road vehicle, or any other motor vehicle ([place] Statute 169A.20). I understand the risk if I choose to drive or operate any vehicle or machinery.    I understand that if I do not follow any of the conditions above, my prescriptions or treatment may be stopped or changed.          Opioids  What You Need to Know    What are opioids?   Opioids are pain medicines that must be prescribed by a doctor. They are also known as narcotics.     Examples are:   1. morphine (MS Contin, Jennifer)  2. oxycodone (Oxycontin)  3. oxycodone and acetaminophen (Percocet)  4. hydrocodone and acetaminophen (Vicodin, Norco)   5. fentanyl patch (Duragesic)   6. hydromorphone (Dilaudid)   7. methadone  8. codeine (Tylenol #3)     What do opioids do well?   Opioids are best for severe short-term pain such as after a surgery or injury. They may work well for cancer pain. They may  help some people with long-lasting (chronic) pain.     What do opioids NOT do well?   Opioids never get rid of pain entirely, and they don t work well for most patients with chronic pain. Opioids don t reduce swelling, one of the causes of pain.                                    Other ways to manage chronic pain and improve function include:       Treat the health problem that may be causing pain    Anti-inflammation medicines, which reduce swelling and tenderness, such as ibuprofen (Advil, Motrin) or naproxen (Aleve)    Acetaminophen (Tylenol)    Antidepressants and anti-seizure medicines, especially for nerve pain    Topical treatments such as patches or creams    Injections or nerve blocks    Chiropractic or osteopathic treatment    Acupuncture, massage, deep breathing, meditation, visual imagery, aromatherapy    Use heat or ice at the pain site    Physical therapy     Exercise    Stop smoking    Take part in therapy       Risks and side effects     Talk to your doctor before you start or decide to keep taking opioids. Possible side effects include:      Lowering your breathing rate enough to cause death    Overdose, including death, especially if taking higher than prescribed doses    Worse depression symptoms; less pleasure in things you usually enjoy    Feeling tired or sluggish    Slower thoughts or cloudy thinking    Being more sensitive to pain over time; pain is harder to control    Trouble sleeping or restless sleep    Changes in hormone levels (for example, less testosterone)    Changes in sex drive or ability to have sex    Constipation    Unsafe driving    Itching and sweating    Dizziness    Nausea, throwing up and dry mouth    What else should I know about opioids?    Opioids may lead to dependence, tolerance, or addiction.      Dependence means that if you stop or reduce the medicine too quickly, you will have withdrawal symptoms. These include loose poop (diarrhea), jitters, flu-like symptoms,  nervousness and tremors. Dependence is not the same as addiction.                       Tolerance means needing higher doses over time to get the same effect. This may increase the chance of serious side effects.      Addiction is when people improperly use a substance that harms their body, their mind or their relations with others. Use of opiates can cause a relapse of addiction if you have a history of drug or alcohol abuse.      People who have used opioids for a long time may have a lower quality of life, worse depression, higher levels of pain and more visits to doctors.    You can overdose on opioids. Take these steps to lower your risk of overdose:    1. Recognize the signs:  Signs of overdose include decrease or loss of consciousness (blackout), slowed breathing, trouble waking up and blue lips. If someone is worried about overdose, they should call 911.    2. Talk to your doctor about Narcan (naloxone).   If you are at risk for overdose, you may be given a prescription for Narcan. This medicine very quickly reverses the effects of opioids.   If you overdose, a friend or family member can give you Narcan while waiting for the ambulance. They need to know the signs of overdose and how to give Narcan.     3. Don't use alcohol or street drugs.   Taking them with opioids can cause death.    4. Do not take any of these medicines unless your doctor says it s OK. Taking these with opioids can cause death:    Benzodiazepines, such as lorazepam (Ativan), alprazolam (Xanax) or diazepam (Valium)    Muscle relaxers, such as cyclobenzaprine (Flexeril)    Sleeping pills like zolpidem (Ambien)     Other opioids      How to keep you and other people safe while taking opioids:    1. Never share your opioids with others.  Opioid medicines are regulated by the Drug Enforcement Agency ([employer]). Selling or sharing medications is a criminal act.    2. Be sure to store opioids in a secure place, locked up if possible. Young children  can easily swallow them and overdose.    3. When you are traveling with your medicines, keep them in the original bottles. If you use a pill box, be sure you also carry a copy of your medicine list from your clinic or pharmacy.    4. Safe disposal of opioids    Most pharmacies have places to get rid of medicine, called disposal kiosks. Medicine disposal options are also available in every [facility]. Search your county and  medication disposal  to find more options. You can find more details at:  [email or web address]     I agree that my provider, clinic care team, and pharmacy may work with any city, state or federal law enforcement agency that investigates the misuse, sale, or other diversion of my controlled medicine. I will allow my provider to discuss my care with, or share a copy of, this agreement with any other treating provider, pharmacy or emergency room where I receive care.    I have read this agreement and have asked questions about anything I did not understand.    _______________________________________________________  Patient Signature - Mane Chawla _____________________                   Date     _______________________________________________________  Provider Signature - Filemon Magana MD   _____________________                   Date     _______________________________________________________  Witness Signature (required if provider not present while patient signing)   _____________________                   Date

## 2022-01-04 NOTE — PROGRESS NOTES
Problem(s) Oriented visit        SUBJECTIVE:                                                    Mane Chawla is a 66 year old male who presents to clinic today for the following health issues :        1. Encounter for long-term (current) use of medications    - UScreen Toxisure ([facility])    2. Spinal stenosis of lumbar region with neurogenic claudication  Ongoing pain in the back and     3. Psoriatic arthritis (H)  Known issue that I take into account for his medical decisions, no current exacerbations or new concerns.    4. Atherosclerosis of aorta (H)  Known issue that I take into account for his medical decisions, no current exacerbations or new concerns.          Problem list, Medication list, Allergies, and Medical/Social/Surgical histories reviewed in EPIC and updated as appropriate.   Additional history: as documented    ROS:  General and Resp. completed and negative except as noted above    Histories:   Patient Active Problem List   Diagnosis     Psoriatic arthritis (H)     Hyperlipidemia LDL goal <160     ACP (advance care planning)     Health Care Home     Midline low back pain without sciatica     Benign essential hypertension     Encounter for long-term (current) use of medications     Nonrheumatic aortic sclerosis     Spinal stenosis of lumbar region with neurogenic claudication     Atherosclerosis of aorta (H)     Past Surgical History:   Procedure Laterality Date     ARTHROSCOPY KNEE RT/LT       CARPAL TUNNEL RELEASE RT/LT       FUSION CERVICAL ANTERIOR THREE+ LEVELS       FUSION LUMBAR ANTERIOR ONE LEVEL       ROTATOR CUFF REPAIR RT/LT         Social History     Tobacco Use     Smoking status: Former Smoker     Packs/day: 1.00     Years: 30.00     Pack years: 30.00     Types: Cigarettes     Quit date: 3/21/1996     Years since quittin.8     Smokeless tobacco: Never Used   Substance Use Topics     Alcohol use: Yes     Alcohol/week: 8.3 standard drinks     Types: 10 Standard drinks or equivalent  "per week     Family History   Problem Relation Age of Onset     Cerebrovascular Disease Father      Alzheimer Disease Father      Respiratory Mother            OBJECTIVE:                                                    /71   Pulse 60   Resp 18   Ht 1.715 m (5' 7.5\")   Wt 79.3 kg (174 lb 12.8 oz)   SpO2 97%   BMI 26.97 kg/m    Body mass index is 26.97 kg/m .   APPEARANCE: = Relaxed and in no distress  Musculsktl: arthritic changes of the spine sits      ASSESSMENT/PLAN:                                                        Mane was seen today for recheck medication.    Diagnoses and all orders for this visit:    Encounter for long-term (current) use of medications  -     UScreen Toxisure ([facility])    Spinal stenosis of lumbar region with neurogenic claudication    Psoriatic arthritis (H)    Atherosclerosis of aorta (H)    Reviewed his pain history.  Has had long standing pain in all of his major joints.  Has seen Orthopedists and Rheumatologists and has been told there is nothing that can be done.  His joints hurt all the time.   He has been [facility] to the  [facility], but still prefers just to see me.   We have tried to titrate down on his current doses of pain medications, but each time this has caused significant worsening of pain and significant decrease in functionality.   He has been very good in following instructions regarding pain med use, follow up appointments.  There has never been any signs of inappropriate use of these medications or irregularity regarding refills.      MEDICATIONS:        - Decrease Dilaudid to 2 mg at bed time       - Continue other medications without change  Regular exercise    There are no Patient Instructions on file for this visit.    The following health maintenance items are reviewed in Epic and correct as of today:  Health Maintenance   Topic Date Due     ADVANCE CARE PLANNING  03/25/2018     MEDICARE ANNUAL WELLNESS VISIT  11/20/2018     DTAP/TDAP/TD " IMMUNIZATION (2 - Td or Tdap) 08/03/2019     TIFFANIE ASSESSMENT  03/28/2020     PHQ-9  03/28/2020     URINE DRUG SCREEN  12/10/2021     FALL RISK ASSESSMENT  12/10/2021     PHQ-2  01/01/2022     LIPID  01/26/2026     COLORECTAL CANCER SCREENING  04/13/2031     HEPATITIS C SCREENING  Completed     INFLUENZA VACCINE  Completed     Pneumococcal Vaccine: 65+ Years  Completed     ZOSTER IMMUNIZATION  Completed     AORTIC ANEURYSM SCREENING (SYSTEM ASSIGNED)  Completed     COVID-19 Vaccine  Completed     IPV IMMUNIZATION  Aged Out     MENINGITIS IMMUNIZATION  Aged Out     HEPATITIS B IMMUNIZATION  Aged Out       Filemon Magana MD  [facility]  Family Practice  [facility]  [phone]    For any issues my office # is [phone]

## 2022-01-05 ASSESSMENT — ANXIETY QUESTIONNAIRES: GAD7 TOTAL SCORE: 2

## 2022-01-26 ENCOUNTER — TRANSFERRED RECORDS (OUTPATIENT)
Dept: FAMILY MEDICINE | Facility: CLINIC | Age: 67
End: 2022-01-26

## 2022-02-03 DIAGNOSIS — M62.838 MUSCLE SPASM: ICD-10-CM

## 2022-02-03 DIAGNOSIS — M48.062 SPINAL STENOSIS OF LUMBAR REGION WITH NEUROGENIC CLAUDICATION: ICD-10-CM

## 2022-02-03 RX ORDER — HYDROMORPHONE HYDROCHLORIDE 2 MG/1
2 TABLET ORAL AT BEDTIME
Qty: 30 TABLET | Refills: 0 | Status: SHIPPED | OUTPATIENT
Start: 2022-02-03 | End: 2022-03-01

## 2022-02-03 RX ORDER — DIAZEPAM 5 MG
TABLET ORAL
Qty: 30 TABLET | Refills: 1 | Status: SHIPPED | OUTPATIENT
Start: 2022-02-03 | End: 2022-04-22

## 2022-02-19 ENCOUNTER — HEALTH MAINTENANCE LETTER (OUTPATIENT)
Age: 67
End: 2022-02-19

## 2022-02-21 ENCOUNTER — TRANSFERRED RECORDS (OUTPATIENT)
Dept: FAMILY MEDICINE | Facility: CLINIC | Age: 67
End: 2022-02-21

## 2022-02-25 ENCOUNTER — TRANSFERRED RECORDS (OUTPATIENT)
Dept: FAMILY MEDICINE | Facility: CLINIC | Age: 67
End: 2022-02-25

## 2022-03-01 ENCOUNTER — OFFICE VISIT (OUTPATIENT)
Dept: FAMILY MEDICINE | Facility: CLINIC | Age: 67
End: 2022-03-01

## 2022-03-01 VITALS
HEIGHT: 68 IN | OXYGEN SATURATION: 99 % | HEART RATE: 53 BPM | DIASTOLIC BLOOD PRESSURE: 66 MMHG | WEIGHT: 174.8 LBS | BODY MASS INDEX: 26.49 KG/M2 | SYSTOLIC BLOOD PRESSURE: 155 MMHG | RESPIRATION RATE: 18 BRPM

## 2022-03-01 DIAGNOSIS — K92.1 BLACK STOOL: ICD-10-CM

## 2022-03-01 DIAGNOSIS — Z87.891 SMOKING HISTORY: Primary | ICD-10-CM

## 2022-03-01 DIAGNOSIS — I10 BENIGN ESSENTIAL HYPERTENSION: ICD-10-CM

## 2022-03-01 DIAGNOSIS — M48.062 SPINAL STENOSIS OF LUMBAR REGION WITH NEUROGENIC CLAUDICATION: ICD-10-CM

## 2022-03-01 LAB
% GRANULOCYTES: 56.2 % (ref 42.2–75.2)
HCT VFR BLD AUTO: 39.5 % (ref 39–51)
HEMOGLOBIN: 13.3 G/DL (ref 13.4–17.5)
LYMPHOCYTES NFR BLD AUTO: 36.1 % (ref 20.5–51.1)
MCH RBC QN AUTO: 31.6 PG (ref 27–31)
MCHC RBC AUTO-ENTMCNC: 33.7 G/DL (ref 33–37)
MCV RBC AUTO: 93.7 FL (ref 80–100)
MONOCYTES NFR BLD AUTO: 7.7 % (ref 1.7–9.3)
PLATELET # BLD AUTO: 301 K/UL (ref 140–450)
RBC # BLD AUTO: 4.21 X10/CMM (ref 4.2–5.9)
WBC # BLD AUTO: 9.3 X10/CMM (ref 3.8–11)

## 2022-03-01 PROCEDURE — 93050 ART PRESSURE WAVEFORM ANALYS: CPT | Performed by: FAMILY MEDICINE

## 2022-03-01 PROCEDURE — 99214 OFFICE O/P EST MOD 30 MIN: CPT | Performed by: FAMILY MEDICINE

## 2022-03-01 PROCEDURE — 36415 COLL VENOUS BLD VENIPUNCTURE: CPT | Performed by: FAMILY MEDICINE

## 2022-03-01 PROCEDURE — 85025 COMPLETE CBC W/AUTO DIFF WBC: CPT | Performed by: FAMILY MEDICINE

## 2022-03-01 RX ORDER — HYDROMORPHONE HYDROCHLORIDE 2 MG/1
2 TABLET ORAL AT BEDTIME
Qty: 30 TABLET | Refills: 0 | Status: SHIPPED | OUTPATIENT
Start: 2022-03-01 | End: 2022-03-31

## 2022-03-01 NOTE — PROGRESS NOTES
Went to [facility], dr. Cook ([facility]) had injection of SI joint on the left and got relief for 4 days....  Now back to usual pain.  Neuro surgeon thinks that might be able to do a pinning.  Next step is a CT scan.  All of his pain is in this area so excited to potentially make some progress.  Problem(s) Oriented visit      ROS:  General and Resp. completed and negative except as noted above     HISTORY:   reports current alcohol use of about 8.3 standard drinks of alcohol per week.   reports that he quit smoking about 25 years ago. His smoking use included cigarettes. He has a 30.00 pack-year smoking history. He has never used smokeless tobacco.    Past Medical History:   Diagnosis Date     ACP (advance care planning) 3-25-13     GERD (gastroesophageal reflux disease)      HTN, goal below 140/80      Hypercholesterolemia      Hyperlipidemia LDL goal <160 6/15/2011     Psoriatic arthritis (H)      Past Surgical History:   Procedure Laterality Date     ARTHROSCOPY KNEE RT/LT       CARPAL TUNNEL RELEASE RT/LT       FUSION CERVICAL ANTERIOR THREE+ LEVELS       FUSION LUMBAR ANTERIOR ONE LEVEL       ROTATOR CUFF REPAIR RT/LT         EXAM:  BP: 155/66[AtCor[   Pulse: 53    Temp: Data Unavailable    Wt Readings from Last 2 Encounters:   03/01/22 79.3 kg (174 lb 12.8 oz)   01/04/22 79.3 kg (174 lb 12.8 oz)       BMI= Body mass index is 26.97 kg/m .    EXAM:  APPEARANCE: = Relaxed and in no distress  Thyroid = Not enlarged and no masses felt  Breath Sounds = Good air movement with no rales or rhonchi in any lung fields  Recent/Remote Memory = Alert and Oriented x 3      Assessment/Plan:  Mane was seen today for recheck medication and nasal congestion.    Diagnoses and all orders for this visit:    Benign essential hypertension  Reviewed his current HTN management. Discussed our goal for him is a systolic pressure at or below 128 and diastolic pressure at or below 83.  We today managed his prescriptions with  "refills ensured to ensure availabilty of current medications.  Discussed the importance for aggressive management of HTN to prevent vascular complications later.  Recommended lower fat, lower carbohydrate, and lower sodium (<2000 mg)diet. Required intervals for follow up on HTN, lab studies reviewed.    Strongly recommened he follow his blood pressures outside the clinic to ensure that BPs are remaining within guidelines,.  Instructed to contact me if the readings are not within guidelines on a regular basis so we can adjust treatment as needed.    -     AL ART PRESS WAVEFORM ANALYS CENTRAL ART PRESSURE    Spinal stenosis of lumbar region with neurogenic claudication  exciting possibility with   -     HYDROmorphone (DILAUDID) 2 MG tablet; Take 1 tablet (2 mg) by mouth At Bedtime    Black stool  Check CBC, takes a lot of ibuprofen  hgb is 13  -     CBC with Diff/Plt ([facility])  -     Comp. Metabolic Panel (14) ([facility])    Smoking history  -     Referral to [facility]; Future        COUNSELING:   reports that he quit smoking about 25 years ago. His smoking use included cigarettes. He has a 30.00 pack-year smoking history. He has never used smokeless tobacco.    Estimated body mass index is 26.97 kg/m  as calculated from the following:    Height as of this encounter: 1.715 m (5' 7.5\").    Weight as of this encounter: 79.3 kg (174 lb 12.8 oz).       Appropriate preventive services were discussed with this patient, including applicable screening as appropriate for cardiovascular disease, diabetes, osteopenia/osteoporosis, and glaucoma.  As appropriate for age/gender, discussed screening for colorectal cancer, prostate cancer, breast cancer, and cervical cancer. Checklist reviewing preventive services available has been given to the patient.    Reviewed patients plan of care and provided an AVS. The Basic Care Plan (routine screening as documented in Health Maintenance) for Mane meets the Care Plan requirement. This " Care Plan has been established and reviewed with the  Patient.      The following health maintenance items are reviewed in Epic and correct as of today:  Health Maintenance   Topic Date Due     ADVANCE CARE PLANNING  03/25/2018     MEDICARE ANNUAL WELLNESS VISIT  11/20/2018     DTAP/TDAP/TD IMMUNIZATION (2 - Td or Tdap) 08/03/2019     TIFFANIE ASSESSMENT  01/04/2023     URINE DRUG SCREEN  01/04/2023     FALL RISK ASSESSMENT  01/04/2023     PHQ-9  01/04/2023     LIPID  01/26/2026     COLORECTAL CANCER SCREENING  04/13/2031     HEPATITIS C SCREENING  Completed     PHQ-2  Completed     INFLUENZA VACCINE  Completed     Pneumococcal Vaccine: 65+ Years  Completed     ZOSTER IMMUNIZATION  Completed     AORTIC ANEURYSM SCREENING (SYSTEM ASSIGNED)  Completed     COVID-19 Vaccine  Completed     IPV IMMUNIZATION  Aged Out     MENINGITIS IMMUNIZATION  Aged Out     HEPATITIS B IMMUNIZATION  Aged Out       Filemon Magana  [facility]  For any issues my office # is [phone]

## 2022-03-02 LAB
ALBUMIN SERPL-MCNC: 4.9 G/DL (ref 3.8–4.8)
ALBUMIN/GLOB SERPL: 2 {RATIO} (ref 1.2–2.2)
ALP SERPL-CCNC: 61 IU/L (ref 44–121)
ALT SERPL-CCNC: 18 IU/L (ref 0–44)
AST SERPL-CCNC: 19 IU/L (ref 0–40)
BILIRUB SERPL-MCNC: 0.3 MG/DL (ref 0–1.2)
BUN SERPL-MCNC: 22 MG/DL (ref 8–27)
BUN/CREATININE RATIO: 25 (ref 10–24)
CALCIUM SERPL-MCNC: 9.5 MG/DL (ref 8.6–10.2)
CHLORIDE SERPLBLD-SCNC: 100 MMOL/L (ref 96–106)
CREAT SERPL-MCNC: 0.88 MG/DL (ref 0.76–1.27)
EGFR: 94 ML/MIN/1.73
GLOBULIN, TOTAL: 2.4 G/DL (ref 1.5–4.5)
GLUCOSE SERPL-MCNC: 90 MG/DL (ref 65–99)
POTASSIUM SERPL-SCNC: 4.3 MMOL/L (ref 3.5–5.2)
PROT SERPL-MCNC: 7.3 G/DL (ref 6–8.5)
SODIUM SERPL-SCNC: 137 MMOL/L (ref 134–144)
TOTAL CO2: 21 MMOL/L (ref 20–29)

## 2022-03-09 ENCOUNTER — TRANSFERRED RECORDS (OUTPATIENT)
Dept: FAMILY MEDICINE | Facility: CLINIC | Age: 67
End: 2022-03-09

## 2022-03-31 DIAGNOSIS — M48.062 SPINAL STENOSIS OF LUMBAR REGION WITH NEUROGENIC CLAUDICATION: ICD-10-CM

## 2022-03-31 RX ORDER — HYDROMORPHONE HYDROCHLORIDE 2 MG/1
2 TABLET ORAL AT BEDTIME
Qty: 30 TABLET | Refills: 0 | Status: SHIPPED | OUTPATIENT
Start: 2022-03-31 | End: 2022-05-03

## 2022-04-04 ENCOUNTER — TRANSFERRED RECORDS (OUTPATIENT)
Dept: FAMILY MEDICINE | Facility: CLINIC | Age: 67
End: 2022-04-04

## 2022-04-22 ENCOUNTER — OFFICE VISIT (OUTPATIENT)
Dept: FAMILY MEDICINE | Facility: CLINIC | Age: 67
End: 2022-04-22

## 2022-04-22 VITALS
RESPIRATION RATE: 18 BRPM | TEMPERATURE: 97.5 F | HEART RATE: 74 BPM | BODY MASS INDEX: 26.52 KG/M2 | HEIGHT: 68 IN | DIASTOLIC BLOOD PRESSURE: 89 MMHG | SYSTOLIC BLOOD PRESSURE: 145 MMHG | WEIGHT: 175 LBS | OXYGEN SATURATION: 98 %

## 2022-04-22 DIAGNOSIS — M62.838 MUSCLE SPASM: ICD-10-CM

## 2022-04-22 DIAGNOSIS — K14.6 BURNING MOUTH SYNDROME: Primary | ICD-10-CM

## 2022-04-22 PROCEDURE — 99213 OFFICE O/P EST LOW 20 MIN: CPT | Performed by: FAMILY MEDICINE

## 2022-04-22 RX ORDER — DIAZEPAM 5 MG
TABLET ORAL
Qty: 30 TABLET | Refills: 1 | Status: SHIPPED | OUTPATIENT
Start: 2022-04-22 | End: 2022-08-01

## 2022-04-22 NOTE — PROGRESS NOTES
Burning mouth for a week      Had burning mouth a few years ago,  This time had for a bit and then lost taste,  Smell is perfect.  Had a neg covid test....    Problem(s) Oriented visit      ROS:  General and Resp. completed and negative except as noted above     HISTORY:   reports current alcohol use of about 8.3 standard drinks of alcohol per week.   reports that he quit smoking about 26 years ago. His smoking use included cigarettes. He has a 30.00 pack-year smoking history. He has never used smokeless tobacco.    Past Medical History:   Diagnosis Date     ACP (advance care planning) 3-25-13     GERD (gastroesophageal reflux disease)      HTN, goal below 140/80      Hypercholesterolemia      Hyperlipidemia LDL goal <160 6/15/2011     Psoriatic arthritis (H)      Past Surgical History:   Procedure Laterality Date     ARTHROSCOPY KNEE RT/LT       CARPAL TUNNEL RELEASE RT/LT       FUSION CERVICAL ANTERIOR THREE+ LEVELS       FUSION LUMBAR ANTERIOR ONE LEVEL       ROTATOR CUFF REPAIR RT/LT         EXAM:  BP: 145/89   Pulse: 74    Temp: 97.5    Wt Readings from Last 2 Encounters:   04/22/22 79.4 kg (175 lb)   03/01/22 79.3 kg (174 lb 12.8 oz)       BMI= Body mass index is 27 kg/m .    EXAM:  APPEARANCE: = Relaxed and in no distress  Conj/Eyelids = noninjected and lids and lashes are without inflammation  PERRLA/Irises = Pupils Round Reactive to Light and Irisis without inflammation  Ears/Nose = External structures and Nares have usual shape and form  Ear canals and TM's = Canals are not inflammed and have none or little wax and the drums are not injected and have a light reflex   Lips/Teeth/Gums = No lesions seen, good dentition, and gums seem healthy  Oropharynx = No leukoplakia, No injection to the tissues, Normal Uvula  Neck = No anterior or posterior adenopathy appreciated.  Resp effort = Calm regular breathing  Breath Sounds = Good air movement with no rales or rhonchi in any lung fields  Mood/Affect = Cooperative  "and interested      Assessment/Plan:  Mane was seen today for mouth problem.    Diagnoses and all orders for this visit:    Burning mouth syndrome    No obvious medical cause for the fatigue.    Will check for routine medical cause with the labs as ordered, suspect they will return abnormal.  Will follow up any abnormal labs as indicated.  D  Nonspecific fatigue may have other causes including stress and problems at home or work, or may be associated with underlying mood disorders in general.    Other causes may include sleep apnea, chronic fatigue syndrome, post viral syndromes, fibromyalgia, connective tissue disease etc.  Sleep is horrible due to pain  Likely is the issue for fatigue.    COUNSELING:   reports that he quit smoking about 26 years ago. His smoking use included cigarettes. He has a 30.00 pack-year smoking history. He has never used smokeless tobacco.    Estimated body mass index is 27 kg/m  as calculated from the following:    Height as of this encounter: 1.715 m (5' 7.5\").    Weight as of this encounter: 79.4 kg (175 lb).       Appropriate preventive services were discussed with this patient, including applicable screening as appropriate for cardiovascular disease, diabetes, osteopenia/osteoporosis, and glaucoma.  As appropriate for age/gender, discussed screening for colorectal cancer, prostate cancer, breast cancer, and cervical cancer. Checklist reviewing preventive services available has been given to the patient.    Reviewed patients plan of care and provided an AVS. The Basic Care Plan (routine screening as documented in Health Maintenance) for Mane meets the Care Plan requirement. This Care Plan has been established and reviewed with the  Patient.      The following health maintenance items are reviewed in Epic and correct as of today:  Health Maintenance   Topic Date Due     ADVANCE CARE PLANNING  03/25/2018     MEDICARE ANNUAL WELLNESS VISIT  11/20/2018     DTAP/TDAP/TD IMMUNIZATION (2 " - Td or Tdap) 08/03/2019     TIFFANIE ASSESSMENT  01/04/2023     URINE DRUG SCREEN  01/04/2023     FALL RISK ASSESSMENT  01/04/2023     PHQ-9  01/04/2023     LIPID  01/26/2026     COLORECTAL CANCER SCREENING  04/13/2031     HEPATITIS C SCREENING  Completed     PHQ-2 (once per calendar year)  Completed     INFLUENZA VACCINE  Completed     Pneumococcal Vaccine: 65+ Years  Completed     ZOSTER IMMUNIZATION  Completed     AORTIC ANEURYSM SCREENING (SYSTEM ASSIGNED)  Completed     COVID-19 Vaccine  Completed     IPV IMMUNIZATION  Aged Out     MENINGITIS IMMUNIZATION  Aged Out     HEPATITIS B IMMUNIZATION  Aged Out       Filemon Magana  [facility]  For any issues my office # is [phone]

## 2022-05-03 DIAGNOSIS — M48.062 SPINAL STENOSIS OF LUMBAR REGION WITH NEUROGENIC CLAUDICATION: ICD-10-CM

## 2022-05-03 RX ORDER — HYDROMORPHONE HYDROCHLORIDE 2 MG/1
2 TABLET ORAL AT BEDTIME
Qty: 30 TABLET | Refills: 0 | Status: SHIPPED | OUTPATIENT
Start: 2022-05-03 | End: 2022-06-02

## 2022-05-05 ENCOUNTER — TRANSFERRED RECORDS (OUTPATIENT)
Dept: FAMILY MEDICINE | Facility: CLINIC | Age: 67
End: 2022-05-05

## 2022-05-06 ENCOUNTER — OFFICE VISIT (OUTPATIENT)
Dept: FAMILY MEDICINE | Facility: CLINIC | Age: 67
End: 2022-05-06

## 2022-05-06 VITALS
BODY MASS INDEX: 26.41 KG/M2 | RESPIRATION RATE: 16 BRPM | SYSTOLIC BLOOD PRESSURE: 132 MMHG | WEIGHT: 171.13 LBS | OXYGEN SATURATION: 98 % | HEART RATE: 89 BPM | DIASTOLIC BLOOD PRESSURE: 78 MMHG

## 2022-05-06 DIAGNOSIS — R35.1 BENIGN PROSTATIC HYPERPLASIA WITH NOCTURIA: ICD-10-CM

## 2022-05-06 DIAGNOSIS — N40.1 BENIGN PROSTATIC HYPERPLASIA WITH NOCTURIA: ICD-10-CM

## 2022-05-06 DIAGNOSIS — R39.9 LOWER URINARY TRACT SYMPTOMS (LUTS): Primary | ICD-10-CM

## 2022-05-06 LAB
BACTERIA URINE: 0
BILIRUB UR QL STRIP: NORMAL
BLOOD URINE DIP: NORMAL
CASTS/LPF: 0
COLOR UR: YELLOW
CRYSTAL URINE: 0
EPITHELIAL CELLS - QUEST: NORMAL
GLUCOSE UR STRIP-MCNC: NORMAL MG/DL
KETONES UR QL STRIP: NORMAL
LEUKOCYTE ESTERASE URINE DIP: NORMAL
MUCOUS URINE: NORMAL
NITRITE UR QL STRIP: NORMAL
PH UR STRIP: 6.5 PH (ref 5–9)
PROT UR QL: NORMAL MG/DL (ref ?–0.01)
RBC URINE: 0 (ref 0–3)
SP GR UR STRIP: 1.01 (ref 1–1.02)
UROBILINOGEN UR QL STRIP: 0.2 EU/DL (ref 0.2–1)
WBC URINE: 0 (ref 0–3)

## 2022-05-06 PROCEDURE — 81003 URINALYSIS AUTO W/O SCOPE: CPT | Performed by: FAMILY MEDICINE

## 2022-05-06 PROCEDURE — 99214 OFFICE O/P EST MOD 30 MIN: CPT | Performed by: FAMILY MEDICINE

## 2022-05-06 RX ORDER — TAMSULOSIN HYDROCHLORIDE 0.4 MG/1
CAPSULE ORAL
Qty: 90 CAPSULE | Refills: 0 | Status: SHIPPED | OUTPATIENT
Start: 2022-05-06 | End: 2022-07-01

## 2022-05-06 RX ORDER — TAMSULOSIN HYDROCHLORIDE 0.4 MG/1
0.4 CAPSULE ORAL DAILY
Qty: 30 CAPSULE | Refills: 11 | Status: SHIPPED | OUTPATIENT
Start: 2022-05-06 | End: 2022-05-06

## 2022-05-06 NOTE — PROGRESS NOTES
He complains of urinary hesitancy, weak stream, double and incomplete voiding, intermittent urinary frequency and nocturia times 4-7.    Back pain, had a cortisone injection yesterday    Problem(s) Oriented visit      ROS:  General and Resp. completed and negative except as noted above     HISTORY:   reports current alcohol use of about 8.3 standard drinks of alcohol per week.   reports that he quit smoking about 26 years ago. His smoking use included cigarettes. He has a 30.00 pack-year smoking history. He has never used smokeless tobacco.    Past Medical History:   Diagnosis Date     ACP (advance care planning) 3-25-13     GERD (gastroesophageal reflux disease)      HTN, goal below 140/80      Hypercholesterolemia      Hyperlipidemia LDL goal <160 6/15/2011     Psoriatic arthritis (H)      Past Surgical History:   Procedure Laterality Date     ARTHROSCOPY KNEE RT/LT       CARPAL TUNNEL RELEASE RT/LT       FUSION CERVICAL ANTERIOR THREE+ LEVELS       FUSION LUMBAR ANTERIOR ONE LEVEL       ROTATOR CUFF REPAIR RT/LT         EXAM:  BP: 132/78   Pulse: 89    Temp: Data Unavailable    Wt Readings from Last 2 Encounters:   05/06/22 77.6 kg (171 lb 2 oz)   04/22/22 79.4 kg (175 lb)       BMI= Body mass index is 26.41 kg/m .    EXAM:  APPEARANCE: = Relaxed and in no distress  Resp effort = Calm regular breathing  = testes and penis are without lesions  Rectal = Anus without lesions, no polyps, Prostate nrl sized and no nodules      Assessment/Plan:  Mane was seen today for nocturia.    Diagnoses and all orders for this visit:    Lower urinary tract symptoms (LUTS)  -     Urinalysis ([facility])    Benign prostatic hyperplasia with nocturia  These symptoms sound most consistent with bladder irritation from an enlarged prostate (BPH).  Will make sure the PSA is normal and up to date.    Discussed treatment options for this including lifestyle changes ( i.e. restricting water intake close to bedtime or long trips, avoiding  "caffeine, etc.), medications (including FLOMAX, proscar, saw palmetto, etc), and referral to Urology.    -     tamsulosin (FLOMAX) 0.4 MG capsule; Take 1 capsule (0.4 mg) by mouth daily        COUNSELING:   reports that he quit smoking about 26 years ago. His smoking use included cigarettes. He has a 30.00 pack-year smoking history. He has never used smokeless tobacco.    Estimated body mass index is 26.41 kg/m  as calculated from the following:    Height as of 4/22/22: 1.715 m (5' 7.5\").    Weight as of this encounter: 77.6 kg (171 lb 2 oz).       Appropriate preventive services were discussed with this patient, including applicable screening as appropriate for cardiovascular disease, diabetes, osteopenia/osteoporosis, and glaucoma.  As appropriate for age/gender, discussed screening for colorectal cancer, prostate cancer, breast cancer, and cervical cancer. Checklist reviewing preventive services available has been given to the patient.    Reviewed patients plan of care and provided an AVS. The Basic Care Plan (routine screening as documented in Health Maintenance) for Mane meets the Care Plan requirement. This Care Plan has been established and reviewed with the  Patient.      The following health maintenance items are reviewed in Epic and correct as of today:  Health Maintenance   Topic Date Due     DTAP/TDAP/TD IMMUNIZATION (1 - Tdap) 08/03/2009     ADVANCE CARE PLANNING  03/25/2018     MEDICARE ANNUAL WELLNESS VISIT  11/20/2018     COVID-19 Vaccine (4 - Booster for Moderna series) 03/15/2022     TIFFANIE ASSESSMENT  01/04/2023     URINE DRUG SCREEN  01/04/2023     FALL RISK ASSESSMENT  01/04/2023     PHQ-9  01/04/2023     LIPID  01/26/2026     COLORECTAL CANCER SCREENING  04/13/2031     HEPATITIS C SCREENING  Completed     PHQ-2 (once per calendar year)  Completed     INFLUENZA VACCINE  Completed     Pneumococcal Vaccine: 65+ Years  Completed     ZOSTER IMMUNIZATION  Completed     AORTIC ANEURYSM SCREENING " (SYSTEM ASSIGNED)  Completed     IPV IMMUNIZATION  Aged Out     MENINGITIS IMMUNIZATION  Aged Out     HEPATITIS B IMMUNIZATION  Aged Out       Filemon Magana  [facility]  For any issues my office # is [phone]

## 2022-06-01 DIAGNOSIS — M48.062 SPINAL STENOSIS OF LUMBAR REGION WITH NEUROGENIC CLAUDICATION: ICD-10-CM

## 2022-06-02 RX ORDER — HYDROMORPHONE HYDROCHLORIDE 2 MG/1
2 TABLET ORAL AT BEDTIME
Qty: 30 TABLET | Refills: 0 | Status: SHIPPED | OUTPATIENT
Start: 2022-06-02 | End: 2022-07-01

## 2022-06-20 DIAGNOSIS — I10 BENIGN ESSENTIAL HYPERTENSION: ICD-10-CM

## 2022-06-20 RX ORDER — LISINOPRIL 10 MG/1
TABLET ORAL
Qty: 90 TABLET | Refills: 3 | Status: SHIPPED | OUTPATIENT
Start: 2022-06-20 | End: 2023-06-18

## 2022-06-20 NOTE — TELEPHONE ENCOUNTER
lisinopril (ZESTRIL) 10 MG      LOV 4/22/22    Last labs 3/1/22    BP Readings from Last 3 Encounters:   05/06/22 132/78   04/22/22 (!) 145/89   03/01/22 (!) 155/66     Last Comprehensive Metabolic Panel:  Sodium   Date Value Ref Range Status   03/01/2022 137 134 - 144 mmol/L Final     Potassium   Date Value Ref Range Status   03/01/2022 4.3 3.5 - 5.2 mmol/L Final     Chloride   Date Value Ref Range Status   03/01/2022 100 96 - 106 mmol/L Final     Glucose   Date Value Ref Range Status   03/01/2022 90 65 - 99 mg/dL Final     Urea Nitrogen   Date Value Ref Range Status   03/01/2022 22 8 - 27 mg/dL Final     BUN/Creatinine Ratio   Date Value Ref Range Status   03/01/2022 25 (H) 10 - 24 Final     Creatinine   Date Value Ref Range Status   03/01/2022 0.88 0.76 - 1.27 mg/dL Final     Calcium   Date Value Ref Range Status   03/01/2022 9.5 8.6 - 10.2 mg/dL Final

## 2022-07-01 ENCOUNTER — VIRTUAL VISIT (OUTPATIENT)
Dept: FAMILY MEDICINE | Facility: CLINIC | Age: 67
End: 2022-07-01

## 2022-07-01 DIAGNOSIS — G89.29 OTHER CHRONIC PAIN: Primary | ICD-10-CM

## 2022-07-01 DIAGNOSIS — R00.2 PALPITATIONS: ICD-10-CM

## 2022-07-01 DIAGNOSIS — M48.062 SPINAL STENOSIS OF LUMBAR REGION WITH NEUROGENIC CLAUDICATION: ICD-10-CM

## 2022-07-01 DIAGNOSIS — M54.50 CHRONIC MIDLINE LOW BACK PAIN WITHOUT SCIATICA: ICD-10-CM

## 2022-07-01 DIAGNOSIS — G89.29 CHRONIC MIDLINE LOW BACK PAIN WITHOUT SCIATICA: ICD-10-CM

## 2022-07-01 PROCEDURE — 99214 OFFICE O/P EST MOD 30 MIN: CPT | Mod: GT | Performed by: FAMILY MEDICINE

## 2022-07-01 RX ORDER — HYDROMORPHONE HYDROCHLORIDE 2 MG/1
2 TABLET ORAL AT BEDTIME
Qty: 30 TABLET | Refills: 0 | Status: SHIPPED | OUTPATIENT
Start: 2022-07-02 | End: 2022-08-01

## 2022-07-01 NOTE — PROGRESS NOTES
"  Problem(s) Oriented visit      Video-Visit Details    Type of service:  Video Visit    Video Start Time (time video started): 240    Video End Time (time video stopped): 252    Originating Location (pt. Location): Home    Distant Location (provider location):  [facility]     Mode of Communication:  Video Conference via [facility]    Physician has received verbal consent for a Video Visit from the patient? Yes      Aki Adhikari MD        Mane Chawla is being evaluated via a billable/telephone video visit.      The patient has been notified of following:     \"This video visit will be conducted via a call between you and your physician/provider. We have found that certain health care needs can be provided without the need for an in-person physical exam.  This service lets us provide the care you need with a video conversation.  If a prescription is necessary we can send it directly to your pharmacy.  If lab work is needed we can place an order for that and you can then stop by our lab to have the test done at a later time.    If during the course of the call the physician/provider feels a video visit is not appropriate, you will not be charged for this service.\"     Physician has received verbal consent for a Video Visit from the patient? Yes    SUBJECTIVE:                                                      Mane Chawla is a 66 year old male who is being seen through video consult for evaluation.  He has chronic pain of multiple joints and takes Hydromorphone 2 mg at bedtime.  This has been stable.  He is due for a refill. He has psoriatic arthritis on Remicade followed by rheumatology.      He also has been experiencing heart pounding sensation intermittently and his watch reported -130.  He denies chest pain or SOB.  This has not occurred in the past couple days.  No history of arrhythmia.        ROS:    A 10 system review was completed and is as noted in HPI and otherwise " negative.            OBJECTIVE:                                                      VS not able to be assessed      Gen: Well appearing, NAD  Eyes: Clear  Resp: Breathing comfortably, NAD  Skin: Clear  Psych: normal mood and affect  Neuro: grossly normal              ASSESSMENT/PLAN:                                                        Mane was seen today for refill request and fatigue.    Diagnoses and all orders for this visit:    Other chronic pain  PDMP reviewed without concerns.  Refill sent.      Palpitations  -     Adult Leadless EKG Monitor 3 to 7 Days; Future  Recommend further work up with monitor.  If recurs and does not subside or is causing chest pain or SOB should call 911    Spinal stenosis of lumbar region with neurogenic claudication  -     HYDROmorphone (DILAUDID) 2 MG tablet; Take 1 tablet (2 mg) by mouth At Bedtime    Chronic midline low back pain without sciatica

## 2022-07-19 ENCOUNTER — OFFICE VISIT (OUTPATIENT)
Dept: FAMILY MEDICINE | Facility: CLINIC | Age: 67
End: 2022-07-19

## 2022-07-19 VITALS
SYSTOLIC BLOOD PRESSURE: 126 MMHG | HEART RATE: 62 BPM | OXYGEN SATURATION: 96 % | BODY MASS INDEX: 26.91 KG/M2 | DIASTOLIC BLOOD PRESSURE: 60 MMHG | WEIGHT: 174.4 LBS

## 2022-07-19 DIAGNOSIS — K14.6 BURNING MOUTH SYNDROME: Primary | ICD-10-CM

## 2022-07-19 PROCEDURE — 99213 OFFICE O/P EST LOW 20 MIN: CPT | Performed by: FAMILY MEDICINE

## 2022-07-19 NOTE — PROGRESS NOTES
Kristi Gilliam is a 66 year old patient who presents to clinic for evaluation.  Has burning mouth syndrome that began a couple months ago.  Numerous foods are very irritating and painful.  Reports this occurred about 5 years ago as well.  Saw ENT and had a biopsy.  It resolved spontaneously but has recurred.  Feels somewhat better over the past couple days.  No other concerns.        Review of Systems   Constitutional, HEENT, cardiovascular, pulmonary, GI, , musculoskeletal, neuro, skin, endocrine and psych systems are negative, except as otherwise noted.      Objective    /60   Pulse 62   Wt 79.1 kg (174 lb 6.4 oz)   SpO2 96%   BMI 26.91 kg/m      General: Well appearing, NAD  Oropharynx is clear without lesions  Psych: normal mood and affect      Burning mouth syndrome  Discussed referral, trial of gabapentin/pregabalin, trial of capsaicin and other options.  He elects to monitor given recent improvement and if not continuing to improve or worsens will call and would start gabapentin.

## 2022-08-01 ENCOUNTER — OFFICE VISIT (OUTPATIENT)
Dept: FAMILY MEDICINE | Facility: CLINIC | Age: 67
End: 2022-08-01

## 2022-08-01 VITALS
WEIGHT: 174 LBS | BODY MASS INDEX: 26.37 KG/M2 | HEART RATE: 65 BPM | OXYGEN SATURATION: 97 % | RESPIRATION RATE: 16 BRPM | DIASTOLIC BLOOD PRESSURE: 68 MMHG | SYSTOLIC BLOOD PRESSURE: 119 MMHG | HEIGHT: 68 IN

## 2022-08-01 DIAGNOSIS — R35.1 BENIGN PROSTATIC HYPERPLASIA WITH NOCTURIA: Primary | ICD-10-CM

## 2022-08-01 DIAGNOSIS — M62.838 MUSCLE SPASM: ICD-10-CM

## 2022-08-01 DIAGNOSIS — M25.511 CHRONIC RIGHT SHOULDER PAIN: ICD-10-CM

## 2022-08-01 DIAGNOSIS — M48.062 SPINAL STENOSIS OF LUMBAR REGION WITH NEUROGENIC CLAUDICATION: ICD-10-CM

## 2022-08-01 DIAGNOSIS — N40.1 BENIGN PROSTATIC HYPERPLASIA WITH NOCTURIA: Primary | ICD-10-CM

## 2022-08-01 DIAGNOSIS — G89.29 CHRONIC RIGHT SHOULDER PAIN: ICD-10-CM

## 2022-08-01 PROCEDURE — 99214 OFFICE O/P EST MOD 30 MIN: CPT | Performed by: FAMILY MEDICINE

## 2022-08-01 RX ORDER — HYDROMORPHONE HYDROCHLORIDE 4 MG/1
4 TABLET ORAL AT BEDTIME
Qty: 30 TABLET | Refills: 0 | Status: SHIPPED | OUTPATIENT
Start: 2022-08-01 | End: 2022-09-02

## 2022-08-01 RX ORDER — DIAZEPAM 5 MG
TABLET ORAL
Qty: 30 TABLET | Refills: 1 | Status: SHIPPED | OUTPATIENT
Start: 2022-08-01 | End: 2023-05-10

## 2022-08-01 NOTE — PROGRESS NOTES
"Problem(s) Oriented visit        SUBJECTIVE:                                                    Mane Chawla is a 66 year old male who presents to clinic today for the following health issues :        1. Pain in the knee, getting injections.  Awaiting shoulder surgery on there right with Dr. Renee, unable to lift a cup of coffee,  Also in back from spinal stenosis of lumbar region with neurogenic claudication-   2. Muscle spasm  Ongoing spasm    3. BPH  Unable to tolerate the meds, made feel \"hazy\"     Problem list, Medication list, Allergies, and Medical/Social/Surgical histories reviewed in EPIC and updated as appropriate.   Additional history: as documented    ROS:  General and Resp. completed and negative except as noted above    Histories:   Patient Active Problem List   Diagnosis     Psoriatic arthritis (H)     Hyperlipidemia LDL goal <160     ACP (advance care planning)     Health Care Home     Midline low back pain without sciatica     Benign essential hypertension     Encounter for long-term (current) use of medications     Nonrheumatic aortic sclerosis     Spinal stenosis of lumbar region with neurogenic claudication     Atherosclerosis of aorta (H)     Past Surgical History:   Procedure Laterality Date     ARTHROSCOPY KNEE RT/LT       CARPAL TUNNEL RELEASE RT/LT       FUSION CERVICAL ANTERIOR THREE+ LEVELS       FUSION LUMBAR ANTERIOR ONE LEVEL       ROTATOR CUFF REPAIR RT/LT         Social History     Tobacco Use     Smoking status: Former Smoker     Packs/day: 1.00     Years: 30.00     Pack years: 30.00     Types: Cigarettes     Quit date: 3/21/1996     Years since quittin.3     Smokeless tobacco: Never Used   Substance Use Topics     Alcohol use: Yes     Alcohol/week: 8.3 standard drinks     Types: 10 Standard drinks or equivalent per week     Family History   Problem Relation Age of Onset     Cerebrovascular Disease Father      Alzheimer Disease Father      Respiratory Mother      " "      OBJECTIVE:                                                    /68   Pulse 65   Resp 16   Ht 1.715 m (5' 7.5\")   Wt 78.9 kg (174 lb)   SpO2 97%   BMI 26.85 kg/m    Body mass index is 26.85 kg/m .   APPEARANCE: = alert, mild distress and sits with his body at an angle and in obvious pain with the neck     ASSESSMENT/PLAN:                                                        Mane was seen today for recheck medication.    Diagnoses and all orders for this visit:    Benign prostatic hyperplasia with nocturia    Spinal stenosis of lumbar region with neurogenic claudication  -     HYDROmorphone (DILAUDID) 4 MG tablet; Take 1 tablet (4 mg) by mouth At Bedtime    Muscle spasm  -     diazepam (VALIUM) 5 MG tablet; TAKE 0.5 TABLET BY MOUTH AT BEDTIME AS NEEDED FOR severe MUSCLE SPASMS    Chronic right shoulder pain  Injection this afternoon planned.      See Patient Instructions  There are no Patient Instructions on file for this visit.    The following health maintenance items are reviewed in Epic and correct as of today:  Health Maintenance   Topic Date Due     ADVANCE CARE PLANNING  03/25/2018     MEDICARE ANNUAL WELLNESS VISIT  11/20/2018     DTAP/TDAP/TD IMMUNIZATION (2 - Td or Tdap) 08/03/2019     INFLUENZA VACCINE (1) 09/01/2022     TIFFANIE ASSESSMENT  01/04/2023     URINE DRUG SCREEN  01/04/2023     FALL RISK ASSESSMENT  01/04/2023     PHQ-9  01/04/2023     LIPID  01/26/2026     COLORECTAL CANCER SCREENING  04/13/2031     HEPATITIS C SCREENING  Completed     PHQ-2 (once per calendar year)  Completed     Pneumococcal Vaccine: 65+ Years  Completed     ZOSTER IMMUNIZATION  Completed     AORTIC ANEURYSM SCREENING (SYSTEM ASSIGNED)  Completed     COVID-19 Vaccine  Completed     IPV IMMUNIZATION  Aged Out     MENINGITIS IMMUNIZATION  Aged Out     HEPATITIS B IMMUNIZATION  Aged Out       Filemon Magana MD  [facility]  Family Practice  [facility]  [phone]    For any issues " my office # is [phone]

## 2022-09-01 DIAGNOSIS — M48.062 SPINAL STENOSIS OF LUMBAR REGION WITH NEUROGENIC CLAUDICATION: ICD-10-CM

## 2022-09-01 NOTE — TELEPHONE ENCOUNTER
Prescription refill request for dilaudid 4mg. Last office visit 8/1/22, CSA signed 1/4/22 and tox screen done 1/4/22. [place]  checked and fill history below. Refill routed to Dr Magana for review. Becky Williamson

## 2022-09-02 RX ORDER — HYDROMORPHONE HYDROCHLORIDE 4 MG/1
4 TABLET ORAL AT BEDTIME
Qty: 30 TABLET | Refills: 0 | Status: SHIPPED | OUTPATIENT
Start: 2022-09-02 | End: 2022-10-06

## 2022-09-07 ENCOUNTER — OFFICE VISIT (OUTPATIENT)
Dept: FAMILY MEDICINE | Facility: CLINIC | Age: 67
End: 2022-09-07

## 2022-09-07 VITALS
HEART RATE: 103 BPM | BODY MASS INDEX: 26.56 KG/M2 | WEIGHT: 172.13 LBS | DIASTOLIC BLOOD PRESSURE: 64 MMHG | RESPIRATION RATE: 16 BRPM | OXYGEN SATURATION: 98 % | SYSTOLIC BLOOD PRESSURE: 116 MMHG

## 2022-09-07 DIAGNOSIS — R21 RASH AND NONSPECIFIC SKIN ERUPTION: Primary | ICD-10-CM

## 2022-09-07 DIAGNOSIS — I47.10 PAROXYSMAL SUPRAVENTRICULAR TACHYCARDIA (H): ICD-10-CM

## 2022-09-07 DIAGNOSIS — S90.851A FOREIGN BODY IN RIGHT FOOT, INITIAL ENCOUNTER: ICD-10-CM

## 2022-09-07 PROBLEM — M17.11 PRIMARY OSTEOARTHRITIS OF RIGHT KNEE: Status: ACTIVE | Noted: 2021-05-14

## 2022-09-07 PROCEDURE — 28190 REMOVAL OF FOOT FOREIGN BODY: CPT | Mod: RT | Performed by: NURSE PRACTITIONER

## 2022-09-07 PROCEDURE — 99213 OFFICE O/P EST LOW 20 MIN: CPT | Mod: 25 | Performed by: NURSE PRACTITIONER

## 2022-09-07 NOTE — PROGRESS NOTES
Problem(s) Oriented visit        SUBJECTIVE:                                                    Mane Chawla is a 66 year old male who presents to clinic today for the following health issues :    Rash on left cheek first noticed about one week ago. Had thicker scales at first that are now thinner and flaking off. Area is sensitive with mild pain when shaving this morning. No drainage noted. Does have psoriasis elsewhere on body. No new soaps, lotions, detergents.     Stepped on glass 3 days ago. Got big chunk of glass out of ball of right foot but  and feels bump. Thinks some glass might still be there.     Recently had 7 day Zio patch due to symptoms of heart racing that he can feel.     Problem list, Medication list, Allergies, and Medical/Social/Surgical histories reviewed in EPIC and updated as appropriate.   Additional history: as documented    ROS:  6 point ROS completed and negative except noted above, including Gen, CV, Resp, MS, Neuro, Skin    OBJECTIVE:                                                    /64   Pulse 103   Resp 16   Wt 78.1 kg (172 lb 2 oz)   SpO2 98%   BMI 26.56 kg/m    Body mass index is 26.56 kg/m .   GENERAL: healthy, alert and no distress  RESP: lungs clear to auscultation - no rales, rhonchi or wheezes  CV: tachycardia  SKIN: scaling patch left cheek measuring 2.2. cm x 1.9 cm - see image below. (patient consent obtained for image to be used in chart). Also bottom of right foot near toes has palpable foreign body without surrounding erythema or drainage  PSYCH: normal affect & mood         ASSESSMENT/PLAN:                                                      Mane was seen today for derm problem and glass in foot.    Diagnoses and all orders for this visit:    Rash and nonspecific skin eruption  Recommend applying Hydrocortisone 2-3 times daily for 1-2 weeks. If not improving then recommend following up with dermatology    Foreign body in right foot, initial  encounter  -     REMOVAL FOREIGN BODY FOOT, SUBCUTANEOUS  Discussed simple incision with removal of foreign body (glass). Risk/benefits including risk for pain, infection discussed. Patient agrees - verbal consent given.  Area was cleansed with alcohol. Site was anesthetized with 1% lidocaine without epi. When appropriate numbing was achieved, small incision was performed. Two small clear glass fragments removed with tweezers. Minimal clear drainage. Hemostasis achieved with light pressure.  Instructed if bleeding occurs, apply gentle pressure and let me know. Dressed with bacitracin & bandaid. Patient instructed on wound care and signs of infection to watch for.    Paroxysmal supraventricular tachycardia (H)  Instructions on vagal maneuvers to use as needed      See Patient Instructions  Patient Instructions   For rash area on face start with over the counter Hydrocortisone - thin layer applied on area 2-3 times daily.   I will call if dermatology wants to see you or if they recommend something different.     Soak foot in warm water with epsom salts 1-2 times daily. Apply vaseline, bacitracin or neosporin to area and cover with bandage next 2-3 days.   Let me know if redness, swelling, purulent drainage develops          ELIN Stephens [facility]  Family Practice  [facility]  [phone]    For any issues my office # is [phone]

## 2022-09-07 NOTE — PATIENT INSTRUCTIONS
For rash area on face start with over the counter Hydrocortisone - thin layer applied on area 2-3 times daily.   I will call if dermatology wants to see you or if they recommend something different.     Soak foot in warm water with epsom salts 1-2 times daily. Apply vaseline, bacitracin or neosporin to area and cover with bandage next 2-3 days.   Let me know if redness, swelling, purulent drainage develops

## 2022-09-12 DIAGNOSIS — I10 BENIGN ESSENTIAL HYPERTENSION: ICD-10-CM

## 2022-09-13 RX ORDER — HYDROCHLOROTHIAZIDE 50 MG/1
TABLET ORAL
Qty: 90 TABLET | Refills: 3 | Status: SHIPPED | OUTPATIENT
Start: 2022-09-13 | End: 2023-09-23

## 2022-09-13 NOTE — TELEPHONE ENCOUNTER
Hydrochlorothiazide  LOV 9/7/22  BP Readings from Last 3 Encounters:   09/07/22 116/64   08/01/22 119/68   07/19/22 126/60     Component      Latest Ref Rng & Units 3/1/2022   Glucose      65 - 99 mg/dL 90   Urea Nitrogen      8 - 27 mg/dL 22   Creatinine      0.76 - 1.27 mg/dL 0.88   eGFR       >59 mL/min/1.73 94   BUN/Creatinine Ratio      10 - 24 25 (H)   Sodium      134 - 144 mmol/L 137   Potassium      3.5 - 5.2 mmol/L 4.3   Chloride      96 - 106 mmol/L 100   Total CO2      20 - 29 mmol/L 21   Calcium      8.6 - 10.2 mg/dL 9.5   Protein Total      6.0 - 8.5 g/dL 7.3   Albumin      3.8 - 4.8 g/dL 4.9 (H)   Globulin, Total      1.5 - 4.5 g/dL 2.4   A/G Ratio      1.2 - 2.2 2.0   Bilirubin Total      0.0 - 1.2 mg/dL 0.3   Alkaline Phosphatase      44 - 121 IU/L 61   AST      0 - 40 IU/L 19   ALT      0 - 44 IU/L 18

## 2022-10-04 DIAGNOSIS — N48.1 BALANITIS: ICD-10-CM

## 2022-10-06 ENCOUNTER — OFFICE VISIT (OUTPATIENT)
Dept: FAMILY MEDICINE | Facility: CLINIC | Age: 67
End: 2022-10-06

## 2022-10-06 VITALS
SYSTOLIC BLOOD PRESSURE: 128 MMHG | RESPIRATION RATE: 16 BRPM | DIASTOLIC BLOOD PRESSURE: 62 MMHG | HEART RATE: 59 BPM | BODY MASS INDEX: 26.67 KG/M2 | HEIGHT: 68 IN | OXYGEN SATURATION: 98 % | WEIGHT: 176 LBS

## 2022-10-06 DIAGNOSIS — N48.1 BALANITIS: ICD-10-CM

## 2022-10-06 DIAGNOSIS — L40.50 PSORIATIC ARTHRITIS (H): ICD-10-CM

## 2022-10-06 DIAGNOSIS — M48.062 SPINAL STENOSIS OF LUMBAR REGION WITH NEUROGENIC CLAUDICATION: ICD-10-CM

## 2022-10-06 DIAGNOSIS — I10 BENIGN ESSENTIAL HYPERTENSION: Primary | ICD-10-CM

## 2022-10-06 DIAGNOSIS — R53.83 FATIGUE, UNSPECIFIED TYPE: ICD-10-CM

## 2022-10-06 DIAGNOSIS — Z23 NEED FOR VACCINATION: ICD-10-CM

## 2022-10-06 PROCEDURE — 90694 VACC AIIV4 NO PRSRV 0.5ML IM: CPT | Performed by: FAMILY MEDICINE

## 2022-10-06 PROCEDURE — 93050 ART PRESSURE WAVEFORM ANALYS: CPT | Performed by: FAMILY MEDICINE

## 2022-10-06 PROCEDURE — 36415 COLL VENOUS BLD VENIPUNCTURE: CPT | Performed by: FAMILY MEDICINE

## 2022-10-06 PROCEDURE — 99214 OFFICE O/P EST MOD 30 MIN: CPT | Mod: 25 | Performed by: FAMILY MEDICINE

## 2022-10-06 PROCEDURE — G0008 ADMIN INFLUENZA VIRUS VAC: HCPCS | Mod: 59 | Performed by: FAMILY MEDICINE

## 2022-10-06 RX ORDER — HYDROMORPHONE HYDROCHLORIDE 4 MG/1
4 TABLET ORAL AT BEDTIME
Qty: 30 TABLET | Refills: 0 | Status: SHIPPED | OUTPATIENT
Start: 2022-10-06 | End: 2022-11-11

## 2022-10-06 RX ORDER — NYSTATIN AND TRIAMCINOLONE ACETONIDE 100000; 1 [USP'U]/G; MG/G
CREAM TOPICAL 2 TIMES DAILY PRN
Qty: 15 G | Refills: 4 | Status: SHIPPED | OUTPATIENT
Start: 2022-10-06 | End: 2024-06-13

## 2022-10-06 NOTE — PROGRESS NOTES
"\"everything sucks\"  Just had a shoulder shot, getting a knee injection in two weeks.  Getting shoulder replacement in December.  His pain is in his back, shoulder, knees.  Never takes the valium with the pain meds unless  by at least 6-7 hours on the rare times he takes on the same day.    Problem(s) Oriented visit      ROS:  General and Resp. completed and negative except as noted above     HISTORY:   reports current alcohol use of about 8.3 standard drinks of alcohol per week.   reports that he quit smoking about 26 years ago. His smoking use included cigarettes. He has a 30.00 pack-year smoking history. He has never used smokeless tobacco.    Past Medical History:   Diagnosis Date     ACP (advance care planning) 3-25-13     GERD (gastroesophageal reflux disease)      HTN, goal below 140/80      Hypercholesterolemia      Hyperlipidemia LDL goal <160 6/15/2011     Psoriatic arthritis (H)      Past Surgical History:   Procedure Laterality Date     ARTHROSCOPY KNEE RT/LT       CARPAL TUNNEL RELEASE RT/LT       FUSION CERVICAL ANTERIOR THREE+ LEVELS       FUSION LUMBAR ANTERIOR ONE LEVEL       ROTATOR CUFF REPAIR RT/LT         EXAM:  BP: 128/62[AtCor[   Pulse: 59    Temp: Data Unavailable    Wt Readings from Last 2 Encounters:   10/06/22 79.8 kg (176 lb)   09/07/22 78.1 kg (172 lb 2 oz)       BMI= Body mass index is 27.16 kg/m .    EXAM:  APPEARANCE: = alert and mild distress  Musculsktl: decreased range of motion in the neck back      Assessment/Plan:  Mane was seen today for recheck medication.    Diagnoses and all orders for this visit:    Benign essential hypertension  Reviewed his current HTN management. Discussed our goal for him is a systolic pressure at or below 128 and diastolic pressure at or below 83.  We today managed his prescriptions with refills ensured to ensure availabilty of current medications.  Discussed the importance for aggressive management of HTN to prevent vascular complications " "later.  Recommended lower fat, lower carbohydrate, and lower sodium (<2000 mg)diet. Required intervals for follow up on HTN, lab studies reviewed.    Strongly recommened he follow his blood pressures outside the clinic to ensure that BPs are remaining within guidelines,.  Instructed to contact me if the readings are not within guidelines on a regular basis so we can adjust treatment as needed.    -     [place] ART PRESS WAVEFORM ANALYS CENTRAL ART PRESSURE  -     Basic Metabolic Panel (8) ([facility])    Psoriatic arthritis (H)    Need for vaccination  -     FLUAD QUADRIVALENT 65+ ([facility])    Spinal stenosis of lumbar region with neurogenic claudication  -     HYDROmorphone (DILAUDID) 4 MG tablet; Take 1 tablet (4 mg) by mouth At Bedtime    Balanitis  -     nystatin-triamcinolone (MYCOLOG II) 046246-3.1 UNIT/GM-% external cream; Apply topically 2 times daily as needed (skin irritation)    Fatigue,  Has had some tick bites this summer  Check for Lyme.    COUNSELING:   reports that he quit smoking about 26 years ago. His smoking use included cigarettes. He has a 30.00 pack-year smoking history. He has never used smokeless tobacco.    Estimated body mass index is 27.16 kg/m  as calculated from the following:    Height as of this encounter: 1.715 m (5' 7.5\").    Weight as of this encounter: 79.8 kg (176 lb).       Appropriate preventive services were discussed with this patient, including applicable screening as appropriate for cardiovascular disease, diabetes, osteopenia/osteoporosis, and glaucoma.  As appropriate for age/gender, discussed screening for colorectal cancer, prostate cancer, breast cancer, and cervical cancer. Checklist reviewing preventive services available has been given to the patient.    Reviewed patients plan of care and provided an AVS. The Basic Care Plan (routine screening as documented in Health Maintenance) for Mane meets the Care Plan requirement. This Care Plan has been established and " reviewed with the  Patient.      The following health maintenance items are reviewed in Epic and correct as of today:  Health Maintenance   Topic Date Due     ADVANCE CARE PLANNING  03/25/2018     MEDICARE ANNUAL WELLNESS VISIT  11/20/2018     DTAP/TDAP/TD IMMUNIZATION (2 - Td or Tdap) 08/03/2019     INFLUENZA VACCINE (1) 09/01/2022     COVID-19 Vaccine (5 - Booster for Moderna series) 09/04/2022     TIFFANIE ASSESSMENT  01/04/2023     URINE DRUG SCREEN  01/04/2023     FALL RISK ASSESSMENT  01/04/2023     PHQ-9  01/04/2023     LIPID  01/26/2026     COLORECTAL CANCER SCREENING  04/13/2031     HEPATITIS C SCREENING  Completed     PHQ-2 (once per calendar year)  Completed     Pneumococcal Vaccine: 65+ Years  Completed     ZOSTER IMMUNIZATION  Completed     AORTIC ANEURYSM SCREENING (SYSTEM ASSIGNED)  Completed     IPV IMMUNIZATION  Aged Out     MENINGITIS IMMUNIZATION  Aged Out     HEPATITIS B IMMUNIZATION  Aged Out       Filemno Magana  [facility]  For any issues my office # is [phone]

## 2022-10-08 LAB
BUN SERPL-MCNC: 19 MG/DL (ref 8–27)
BUN/CREATININE RATIO: 17 (ref 10–24)
CALCIUM SERPL-MCNC: 9.6 MG/DL (ref 8.6–10.2)
CHLORIDE SERPLBLD-SCNC: 102 MMOL/L (ref 96–106)
CREAT SERPL-MCNC: 1.1 MG/DL (ref 0.76–1.27)
EGFR: 73 ML/MIN/1.73
GLUCOSE SERPL-MCNC: 88 MG/DL (ref 70–99)
LYME TOTAL ANTIBODY EIA: NEGATIVE
POTASSIUM SERPL-SCNC: 4.4 MMOL/L (ref 3.5–5.2)
SODIUM SERPL-SCNC: 140 MMOL/L (ref 134–144)
TOTAL CO2: 20 MMOL/L (ref 20–29)

## 2022-10-10 NOTE — RESULT ENCOUNTER NOTE
Dear Mane,     I am writing to report that your included test results are as expected.    Many labs contain some results that are slightly outside of the normal range, I have reviewed any of these results and they require no changes at this time.    Filemon Magana MD

## 2022-10-13 NOTE — PROGRESS NOTES
CLINICAL PHARMACY NOTE: MEDS TO BEDS    Total # of Prescriptions Filled: 2   The following medications were delivered to the patient:  Flecainide 50 mg  Diltiazem  mg    Additional Documentation:   Delivered Rx's to patients room. Gave Rx's to patients mother Monica Skinner. Monica Skinner paid $7.90 copays with cash. Chronic Pain Follow-Up  Observation Brochure and Video    Patient informed of observation status based on provider's order.  Observation brochure was given and the video watched. Patient/Family stated understanding. Questions answered.  Filemon Magana MD    Where in your body do you have pain? Back and neck due to bulging discs  How has your pain affected your ability to work? Can work part time without limitations  Which of these pain treatments have you tried since your last clinic visit? Other: is due to get surgery soon but currently on hold due to wifes illness.  How well are you sleeping? Awakens 6 times a night  How has your mood been since your last visit? About the same  Have you had a significant life event? Housing Concerns  Other aggravating factors: psoriatic arthritis  Taking medication as directed? Yes    PHQ-9 SCORE 11/22/2016 6/1/2018 3/28/2019   PHQ-9 Total Score 3 2 5     TIFFANIE-7 SCORE 11/22/2016 6/1/2018 3/28/2019   Total Score 3 1 1     No flowsheet data found.  Encounter-Level CSA - 07/12/2018:    Controlled Substance Agreement - Scan on 7/12/2018  3:40 PM: CONTROLLED SUBSTANCE AGREEMENT 7/12/18     Encounter-Level CSA - 04/27/2017:    Controlled Substance Agreement - Scan on 4/27/2017  3:48 PM: CONTROLLED SUBSTANCE AGREEMENT 4/27/17     Patient-Level CSA:    There are no patient-level csa.       Problem(s) Oriented visit      ROS:  General and CV completed and negative except as noted above     HISTORY:   reports current alcohol use of about 8.3 standard drinks of alcohol per week.   reports that he quit smoking about 24 years ago. His smoking use included cigarettes. He has a 30.00 pack-year smoking history. He has never used smokeless tobacco.    Past Medical History:   Diagnosis Date     ACP (advance care planning) 3-25-13     GERD (gastroesophageal reflux disease)      HTN, goal below 140/80      Hypercholesterolemia      Hyperlipidemia LDL goal <160 6/15/2011     Psoriatic arthritis  "(H)      Past Surgical History:   Procedure Laterality Date     ARTHROSCOPY KNEE RT/LT       CARPAL TUNNEL RELEASE RT/LT       FUSION CERVICAL ANTERIOR THREE+ LEVELS       FUSION LUMBAR ANTERIOR ONE LEVEL       ROTATOR CUFF REPAIR RT/LT         EXAM:  BP: 164/88   Pulse: 67    Temp: Data Unavailable    Wt Readings from Last 2 Encounters:   12/31/19 83.5 kg (184 lb)   12/19/19 83.9 kg (185 lb)       BMI= There is no height or weight on file to calculate BMI.    EXAM:  APPEARANCE: = Relaxed and in no distress  Conj/Eyelids = noninjected and lids and lashes are without inflammation  Resp effort = Calm regular breathing  SKIN = absent significant rashes or lesions   Recent/Remote Memory = Alert and Oriented x 3  Mood/Affect = Cooperative and interested      Assessment/Plan:  Mane was seen today for recheck medication.    Diagnoses and all orders for this visit:    Balanitis  recurrent  -     nystatin-triamcinolone (MYCOLOG II) 630571-3.1 UNIT/GM-% external cream; Apply topically 2 times daily as needed (skin irritation)    Spinal stenosis of lumbar region with neurogenic claudication  Working toward back surgery, this allows him to sleep-     HYDROmorphone (DILAUDID) 4 MG tablet; Take 1 tablet (4 mg) by mouth At Bedtime    Psoriatic arthritis (H)  Back on EMbrel  Nonrheumatic aortic sclerosis (H)  Known issue no current exacerbations or new concerns.        COUNSELING:   reports that he quit smoking about 24 years ago. His smoking use included cigarettes. He has a 30.00 pack-year smoking history. He has never used smokeless tobacco.    Estimated body mass index is 27.98 kg/m  as calculated from the following:    Height as of 12/31/19: 1.727 m (5' 8\").    Weight as of 12/31/19: 83.5 kg (184 lb).       Appropriate preventive services were discussed with this patient, including applicable screening as appropriate for cardiovascular disease, diabetes, osteopenia/osteoporosis, and glaucoma.  As appropriate for age/gender, " discussed screening for colorectal cancer, prostate cancer, breast cancer, and cervical cancer. Checklist reviewing preventive services available has been given to the patient.    Reviewed patients plan of care and provided an AVS. The Basic Care Plan (routine screening as documented in Health Maintenance) for Mane meets the Care Plan requirement. This Care Plan has been established and reviewed with the  Patient.      The following health maintenance items are reviewed in Epic and correct as of today:  Health Maintenance   Topic Date Due     ADVANCE CARE PLANNING  03/25/2018     MEDICARE ANNUAL WELLNESS VISIT  11/20/2018     LIPID  07/18/2019     DTAP/TDAP/TD IMMUNIZATION (2 - Td) 08/03/2019     PHQ-2  01/01/2020     FALL RISK ASSESSMENT  03/28/2020     PNEUMOCOCCAL IMMUNIZATION 65+ LOW/MEDIUM RISK (2 of 2 - PPSV23) 10/26/2020     COLORECTAL CANCER SCREENING  07/12/2028     HEPATITIS C SCREENING  Completed     INFLUENZA VACCINE  Completed     ZOSTER IMMUNIZATION  Completed     AORTIC ANEURYSM SCREENING (SYSTEM ASSIGNED)  Completed     IPV IMMUNIZATION  Aged Out     MENINGITIS IMMUNIZATION  Aged Out       Filemon Magana  [facility]  For any issues my office # is [phone]

## 2022-11-10 DIAGNOSIS — M48.062 SPINAL STENOSIS OF LUMBAR REGION WITH NEUROGENIC CLAUDICATION: ICD-10-CM

## 2022-11-10 NOTE — TELEPHONE ENCOUNTER
Refill request for Dilaudid 4mg tab. Last office visit 10/6/22, CSA signed and tox screen done 10/6/22. [place]  checked, fill history below. Refill routed to Dr Magana for review. Becky Williamson

## 2022-11-11 RX ORDER — HYDROMORPHONE HYDROCHLORIDE 4 MG/1
4 TABLET ORAL AT BEDTIME
Qty: 30 TABLET | Refills: 0 | Status: SHIPPED | OUTPATIENT
Start: 2022-11-11 | End: 2022-12-01

## 2022-11-29 NOTE — PROGRESS NOTES
[facility]  [address] [place] [address]  Phone: [phone]  Fax: [phone]  Primary Provider: Magaly Magana  Pre-op Performing Provider: MAGALY MAGANA      PREOPERATIVE EVALUATION:  Today's date: 12/1/2022  Preoperative Questionnaire:   No - Have you ever had a heart attack or stroke?  No - Have you ever had surgery on your heart or blood vessels, such as a stent, coronary (heart) bypass, or surgery on an artery in the head, neck, heart, or legs?  No - Do you have chest pain when you are physically active?  No - Do you have a history of heart failure?  No - Do you currently have a cold, bronchitis, or symptoms of other respiratory (head and chest) infections?  No - Do you have a cough, shortness of breath, or wheezing?  No - Do you or anyone in your family have a history of blood clots?  No - Do you or anyone in your family have a serious bleeding problem, such as long-lasting bleeding after surgeries or cuts?  No - Have you ever had anemia or been told to take iron pills?  No - Have you had any abnormal blood loss such as black, tarry or bloody stools, or abnormal vaginal bleeding?  No - Have you ever had a blood transfusion?  Yes - Are you willing to have a blood transfusion if it is medically needed before, during, or after your surgery?  No - Have you or anyone in your family ever had problems with anesthesia (sedation for surgery)?  Yes - Do you have sleep apnea, excessive snoring, or daytime drowsiness?   No - Do you have any artifical heart valves or other implanted medical devices, such as a pacemaker, defibrillator, or continuous glucose monitor?  No - Do you have any artifical joints?  No - Are you allergic to latex?  No - Is there any chance that you may be pregnant?        Mane Chawla is a 67 year old male who presents for a preoperative evaluation.    Surgical Information:  Surgery/Procedure: right shoulder total shoulder replacement with evaluation  of the long head of the biceps  Surgery Location: [facility]   Surgeon: Filemon Sandy  Surgery Date: 12/15/22  Time of Surgery: TBD  Where patient plans to recover: At home with family  Fax number for surgical facility: Note does not need to be faxed, will be available electronically in Epic.    Type of Anesthesia Anticipated: General    Assessment & Plan     The proposed surgical procedure is considered INTERMEDIATE risk.    Preop general physical exam      Primary osteoarthritis of right shoulder    Osteonecrosis (H)  And OA of the shoulder now ready for TSA    Psoriatic arthritis (H)  remicade on hold.    Chronic midline low back pain without sciatica  Ongoing narcotic management with Spinal stenosis of lumbar region with neurogenic claudication    Benign essential hypertension  Well contorlled  - [place] ART PRESS WAVEFORM ANALYS CENTRAL ART PRESSURE    Atherosclerosis of aorta (H)  On statin and ASA    Nonrheumatic aortic sclerosis  Slight murmer    Paroxysmal supraventricular tachycardia (H)  No recent palpitations.  - EKG 12-lead complete w/read - Clinics            Risks and Recommendations:  The patient has the following additional risks and recommendations for perioperative complications:  Cardiovascular:   - previous history of tachycardia with no recent problems  Anemia/Bleeding/Clotting:    - hold asa for a week    Medication Instructions:  Patient is to take all scheduled medications on the day of surgery EXCEPT for modifications listed below:     - aspirin: Discontinue aspirin 7-10 days prior to procedure to reduce bleeding risk. It should be resumed postoperatively.    - Ace Continue   - Beta Blockers: Continue taking on the day of surgery.   - Calcium Channel Blockers: May be continued on the day of surgery.   - Diuretics: HOLD on the day of surgery.    RECOMMENDATION:  APPROVAL GIVEN to proceed with proposed procedure, without further diagnostic evaluation.    Review of external notes as  documented above       Subjective     HPI related to upcoming procedure: end stage shoulder arthritis with years of injections developed some osteonecrosis, now ready for total joint as scheduled.      Health Care Directive:  Patient does not have a Health Care Directive or Living Will: As is reasonable care for most folks, In the short term, he wants usual aggressive medical care.   No desire for long term prolongation of life through artificial means if no hope to bring back to a reasonable status.      Preoperative Review of :   reviewed - controlled substances reflected in medication list.      Status of Chronic Conditions:  See problem list for active medical problems.  Problems all longstanding and stable, except as noted/documented.  See ROS for pertinent symptoms related to these conditions.      Review of Systems  Constitutional, neuro, ENT, endocrine, pulmonary, cardiac, gastrointestinal, genitourinary, musculoskeletal, integument and psychiatric systems are negative, except as otherwise noted.    Patient Active Problem List    Diagnosis Date Noted     Paroxysmal supraventricular tachycardia (H) 09/07/2022     Priority: Medium     Primary osteoarthritis of right knee 05/14/2021     Priority: Medium     Atherosclerosis of aorta (H) 01/26/2021     Priority: Medium     Nonrheumatic aortic sclerosis 10/10/2019     Priority: Medium     Spinal stenosis of lumbar region with neurogenic claudication 10/10/2019     Priority: Medium     Encounter for long-term (current) use of medications 03/13/2018     Priority: Medium     Benign essential hypertension 12/04/2017     Priority: Medium     Midline low back pain without sciatica 10/26/2015     Priority: Medium     ACP (advance care planning)      Priority: Medium     Hyperlipidemia LDL goal <160 06/15/2011     Priority: Medium     Psoriatic arthritis (H)      Priority: Medium      Past Medical History:   Diagnosis Date     ACP (advance care planning) 3-25-13     form given     GERD (gastroesophageal reflux disease)      HTN, goal below 140/80      Hypercholesterolemia      Hyperlipidemia LDL goal <160 6/15/2011     Psoriatic arthritis (H)      Past Surgical History:   Procedure Laterality Date     ARTHROSCOPY KNEE RT/LT       CARPAL TUNNEL RELEASE RT/LT       FUSION CERVICAL ANTERIOR THREE+ LEVELS       FUSION LUMBAR ANTERIOR ONE LEVEL       ROTATOR CUFF REPAIR RT/LT       Current Outpatient Medications   Medication Sig Dispense Refill     aspirin (ASA) 81 MG EC tablet Take 1 tablet (81 mg) by mouth daily       CIMETIDINE ACID REDUCER PO Take 1 tablet by mouth daily as needed        diazepam (VALIUM) 5 MG tablet TAKE 0.5 TABLET BY MOUTH AT BEDTIME AS NEEDED FOR severe MUSCLE SPASMS 30 tablet 1     fish oil-omega-3 fatty acids 1000 MG capsule Take 2 g by mouth daily       hydrochlorothiazide (HYDRODIURIL) 50 MG tablet TAKE 1 TABLET(50 MG) BY MOUTH DAILY 90 tablet 3     HYDROmorphone (DILAUDID) 4 MG tablet Take 1 tablet (4 mg) by mouth At Bedtime 30 tablet 0     Ibuprofen (ADVIL PO) Take 800 mg by mouth 4 times daily        ibuprofen (ADVIL/MOTRIN) 800 MG tablet Take 1 tablet (800 mg) by mouth every 8 hours as needed for moderate pain 60 tablet 11     lisinopril (ZESTRIL) 10 MG tablet TAKE 1 TABLET(10 MG) BY MOUTH DAILY 90 tablet 3     metoprolol succinate ER (TOPROL-XL) 50 MG 24 hr tablet TAKE 1 TABLET(50 MG) BY MOUTH DAILY 90 tablet 3     Multiple Vitamin (MULTI-VITAMINS) TABS Take 1 tablet by mouth       nystatin-triamcinolone (MYCOLOG II) 743453-5.1 UNIT/GM-% external cream Apply topically 2 times daily as needed (skin irritation) 15 g 4     omeprazole (PRILOSEC) 10 MG DR capsule Take 20 mg by mouth every morning        rosuvastatin (CRESTOR) 10 MG tablet TAKE 1 TABLET(10 MG) BY MOUTH DAILY (Patient not taking: Reported on 10/6/2022) 90 tablet 0     tadalafil (CIALIS) 5 MG tablet Take 1 tablet (5 mg) by mouth every 24 hours (Patient not taking: Reported on 10/6/2022) 15  "tablet 3     Turmeric 500 MG TABS Take by mouth daily          Allergies   Allergen Reactions     Lisinopril Fatigue     Cipro [Quinolones] Other (See Comments)     Achy joints     Codeine Nausea     per h&p     Codeine Camsylate Nausea     Fluocinolone Other (See Comments)     \"achy joints\"     Hmg-Coa-R Inhibitors Other (See Comments)     As provastatin- fatigue  On rovustatin  As of 10/1/14        Social History     Tobacco Use     Smoking status: Former     Packs/day: 1.00     Years: 30.00     Pack years: 30.00     Types: Cigarettes     Quit date: 3/21/1996     Years since quittin.7     Smokeless tobacco: Never   Substance Use Topics     Alcohol use: Yes     Alcohol/week: 8.3 standard drinks     Types: 10 Standard drinks or equivalent per week     Family History   Problem Relation Age of Onset     Cerebrovascular Disease Father      Alzheimer Disease Father      Respiratory Mother      History   Drug Use No         Objective     There were no vitals taken for this visit.    Physical Exam    GENERAL APPEARANCE: healthy, alert and no distress     EYES: EOMI,  PERRL     HENT: ear canals and TM's normal and nose and mouth without ulcers or lesions     NECK: no adenopathy, no asymmetry, masses, or scars and thyroid normal to palpation     RESP: lungs clear to auscultation - no rales, rhonchi or wheezes     CV: regular rates and rhythm, normal S1 S2, no S3 or S4 and no murmur, click or rub     ABDOMEN:  soft, nontender, no HSM or masses and bowel sounds normal     MS: decreased rom in the shoulders hips and back     SKIN: no suspicious lesions or rashes     NEURO: Normal strength and tone, sensory exam grossly normal, mentation intact and speech normal     PSYCH: mentation appears normal. and affect normal/bright     LYMPHATICS: No cervical adenopathy    Recent Labs   Lab Test 10/06/22  1315 22  1159 22  0000 21  0850   HGB  --   --  13.3* 13.0*   PLT  --   --  301 362    137  --   --  "   POTASSIUM 4.4 4.3  --   --    CR 1.10 0.88  --   --         Diagnostics:  No results found for this or any previous visit (from the past 240 hour(s)).   EKG: appears normal, NSR, normal axis, normal intervals, no acute ST/T changes c/w ischemia, no LVH by voltage criteria, unchanged from previous tracings    Revised Cardiac Risk Index (RCRI):  The patient has the following serious cardiovascular risks for perioperative complications:   - No serious cardiac risks = 0 points     RCRI Interpretation: 1 point: Class II (low risk - 0.9% complication rate)           Signed Electronically by: Filemon Magana MD  Copy of this evaluation report is provided to requesting physician.

## 2022-11-29 NOTE — PATIENT INSTRUCTIONS
Preparing for Your Surgery  Getting started  A nurse will call you to review your health history and instructions. They will give you an arrival time based on your scheduled surgery time. Please be ready to share:    Your doctor s clinic name and phone number    Your medical, surgical, and anesthesia history    A list of allergies and sensitivities    A list of medicines, including herbal treatments and over-the-counter drugs    Whether the patient has a legal guardian (ask how to send us the papers in advance)  Please tell us if you re pregnant--or if there s any chance you might be pregnant. Some surgeries may injure a fetus (unborn baby), so they require a pregnancy test. Surgeries that are safe for a fetus don t always need a test, and you can choose whether to have one.   If you have a child who s having surgery, please ask for a copy of Preparing for Your Child s Surgery.    Preparing for surgery    Within 10 to 30 days of surgery: Have a pre-op exam (sometimes called an H&P, or History and Physical). This can be done at a clinic or pre-operative center.  ? If you re having a , you may not need this exam. Talk to your care team.    At your pre-op exam, talk to your care team about all medicines you take. If you need to stop any medicines before surgery, ask when to start taking them again.  ? We do this for your safety. Many medicines can make you bleed too much during surgery. Some change how well surgery (anesthesia) drugs work.    Call your insurance company to let them know you re having surgery. (If you don t have insurance, call [phone].)    Call your clinic if there s any change in your health. This includes signs of a cold or flu (sore throat, runny nose, cough, rash, fever). It also includes a scrape or scratch near the surgery site.    If you have questions on the day of surgery, call your hospital or surgery center.  COVID testing  You may need to be tested for COVID-19 before having  surgery. If so, we will give you instructions (or click here).  Eating and drinking guidelines  For your safety: Unless your surgeon tells you otherwise, follow the guidelines below.    Eat and drink as usual until 8 hours before you arrive for surgery. After that, no food or milk.    Drink clear liquids until 2 hours before you arrive. These are liquids you can see through, like water, Gatorade, and Propel Water. They also include plain black coffee and tea (no cream or milk), candy, and breath mints. You can spit out gum when you arrive.    If you drink alcohol: Stop drinking it the night before surgery.    If your care team tells you to take medicine on the morning of surgery, it s okay to take it with a sip of water.  Preventing infection    Shower or bathe the night before and morning of your surgery. Follow the instructions your clinic gave you. (If no instructions, use regular soap.)    Don t shave or clip hair near your surgery site. We ll remove the hair if needed.    Don t smoke or vape the morning of surgery. You may chew nicotine gum up to 2 hours before surgery. A nicotine patch is okay.  ? Note: Some surgeries require you to completely quit smoking and nicotine. Check with your surgeon.    Your care team will make every effort to keep you safe from infection. We will:  ? Clean our hands often with soap and water (or an alcohol-based hand rub).  ? Clean the skin at your surgery site with a special soap that kills germs.  ? Give you a special gown to keep you warm. (Cold raises the risk of infection.)  ? Wear special hair covers, masks, gowns and gloves during surgery.  ? Give antibiotic medicine, if prescribed. Not all surgeries need antibiotics.  What to bring on the day of surgery    Photo ID and insurance card    Copy of your health care directive, if you have one    Glasses and hearing aids (bring cases)  ? You can t wear contacts during surgery    Inhaler and eye drops, if you use them (tell us  about these when you arrive)    CPAP machine or breathing device, if you use them    A few personal items, if spending the night    If you have . . .  ? A pacemaker, ICD (cardiac defibrillator) or other implant: Bring the ID card.  ? An implanted stimulator: Bring the remote control.  ? A legal guardian: Bring a copy of the certified (court-stamped) guardianship papers.  Please remove any jewelry, including body piercings. Leave jewelry and other valuables at home.  If you re going home the day of surgery    You must have a responsible adult drive you home. They should stay with you overnight as well.    If you don t have someone to stay with you, and you aren t safe to go home alone, we may keep you overnight. Insurance often won t pay for this.  After surgery  If it s hard to control your pain or you need more pain medicine, please call your surgeon s office.  Questions?   If you have any questions for your care team, list them here:   ____________________________________________________________________________________________________________________________________________________________________________________________________________________________________________________________________  For informational purposes only. Not to replace the advice of your health care provider. Copyright   2003, 2019 [employer]. All rights reserved. Clinically reviewed by Clarissa Ram MD. [employer] 156370 - REV 10/22.

## 2022-12-01 ENCOUNTER — OFFICE VISIT (OUTPATIENT)
Dept: FAMILY MEDICINE | Facility: CLINIC | Age: 67
End: 2022-12-01

## 2022-12-01 VITALS
RESPIRATION RATE: 16 BRPM | WEIGHT: 175.2 LBS | SYSTOLIC BLOOD PRESSURE: 142 MMHG | TEMPERATURE: 97.5 F | HEART RATE: 55 BPM | DIASTOLIC BLOOD PRESSURE: 70 MMHG | HEIGHT: 68 IN | BODY MASS INDEX: 26.55 KG/M2 | OXYGEN SATURATION: 98 %

## 2022-12-01 DIAGNOSIS — I35.8 NONRHEUMATIC AORTIC SCLEROSIS: ICD-10-CM

## 2022-12-01 DIAGNOSIS — Z01.818 PREOP GENERAL PHYSICAL EXAM: Primary | ICD-10-CM

## 2022-12-01 DIAGNOSIS — I47.10 PAROXYSMAL SUPRAVENTRICULAR TACHYCARDIA (H): ICD-10-CM

## 2022-12-01 DIAGNOSIS — M87.9 OSTEONECROSIS (H): ICD-10-CM

## 2022-12-01 DIAGNOSIS — M54.50 CHRONIC MIDLINE LOW BACK PAIN WITHOUT SCIATICA: ICD-10-CM

## 2022-12-01 DIAGNOSIS — G89.29 CHRONIC MIDLINE LOW BACK PAIN WITHOUT SCIATICA: ICD-10-CM

## 2022-12-01 DIAGNOSIS — I70.0 ATHEROSCLEROSIS OF AORTA (H): ICD-10-CM

## 2022-12-01 DIAGNOSIS — M19.011 PRIMARY OSTEOARTHRITIS OF RIGHT SHOULDER: ICD-10-CM

## 2022-12-01 DIAGNOSIS — M48.062 SPINAL STENOSIS OF LUMBAR REGION WITH NEUROGENIC CLAUDICATION: ICD-10-CM

## 2022-12-01 DIAGNOSIS — L40.50 PSORIATIC ARTHRITIS (H): ICD-10-CM

## 2022-12-01 DIAGNOSIS — I10 BENIGN ESSENTIAL HYPERTENSION: ICD-10-CM

## 2022-12-01 PROCEDURE — 99215 OFFICE O/P EST HI 40 MIN: CPT | Performed by: FAMILY MEDICINE

## 2022-12-01 PROCEDURE — 93050 ART PRESSURE WAVEFORM ANALYS: CPT | Performed by: FAMILY MEDICINE

## 2022-12-01 PROCEDURE — 93000 ELECTROCARDIOGRAM COMPLETE: CPT | Performed by: FAMILY MEDICINE

## 2022-12-01 RX ORDER — HYDROMORPHONE HYDROCHLORIDE 4 MG/1
4 TABLET ORAL AT BEDTIME
Qty: 30 TABLET | Refills: 0 | Status: SHIPPED | OUTPATIENT
Start: 2022-12-10 | End: 2023-02-23

## 2022-12-08 NOTE — PROGRESS NOTES
12/1/22 faxed this preop and EKG to [phone]  [facility]    Nacho Palacios,   [facility]  [phone]

## 2023-01-01 NOTE — PROGRESS NOTES
"  Kristi Gilliam is a 65 year old patient who presents to clinic for evaluation.  Reports 2 weeks of left wrist pain and left scapular pain.  No injury or trauma.  He has a history of chronic cervical and lumbar pain with multiple previous surgeries.  He also has a history of psoriatic arthritis followed by Dr Douglas.  He has been off biologics for an unknown amount of time due to cost but previously did well on Enbrel.  No fever, chills, tick bites, rash.      Review of Systems   Constitutional, HEENT, cardiovascular, pulmonary, GI, , musculoskeletal, neuro, skin, endocrine and psych systems are negative, except as otherwise noted.      Objective    /62   Pulse 59   Resp 16   Ht 1.715 m (5' 7.5\")   Wt 79.8 kg (176 lb)   SpO2 97%   BMI 27.16 kg/m      General: Well appearing, NAD  MSK: there is diffuse wrist lydia, dorsal>volar.  Full but painful ROM.  Mild swelling without erythema or warmth.  There is also left periscapular tenderness to palpation without mass or bony tenderness.  Psych: normal mood and affect        Xray - Reviewed and interpreted by me.  normal    Assessment & Plan     Benign essential hypertension  At goal  - [place] ART PRESS WAVEFORM ANALYS CENTRAL ART PRESSURE  - VENOUS COLLECTION    Psoriatic arthritis (H)  ? Flare.  Discussed with Dr Douglas.  Prednisone taper and follow up with Dr Douglas to discuss biologics  - VENOUS COLLECTION  - predniSONE (DELTASONE) 5 MG tablet; Take 15 mg daily for one week, then 10 mg daily for one week, then 5 mg daily for one week, then stop.    Left wrist pain  See above  - C-Reactive Protein  Quant ([facility])  - Sed Rate Westergren ([facility])  - XR Wrist Left G/E 3 Views; Future  - VENOUS COLLECTION  - XR Wrist Left G/E 3 Views  - predniSONE (DELTASONE) 5 MG tablet; Take 15 mg daily for one week, then 10 mg daily for one week, then 5 mg daily for one week, then stop.    Periscapular pain  Uncertain cause.  ? Neck.  Follow up if not improved with " prednisone.  - VENOUS COLLECTION  - predniSONE (DELTASONE) 5 MG tablet; Take 15 mg daily for one week, then 10 mg daily for one week, then 5 mg daily for one week, then stop.      40 minutes total time including chart review, exam, discussion with specialist, face to face time, and documentation.     Aki Adhikari MD  [facility]           Statement Selected

## 2023-01-09 DIAGNOSIS — I10 BENIGN ESSENTIAL HYPERTENSION: ICD-10-CM

## 2023-01-10 RX ORDER — METOPROLOL SUCCINATE 50 MG/1
50 TABLET, EXTENDED RELEASE ORAL DAILY
Qty: 90 TABLET | Refills: 3 | Status: SHIPPED | OUTPATIENT
Start: 2023-01-10 | End: 2024-02-11

## 2023-01-27 ENCOUNTER — TRANSFERRED RECORDS (OUTPATIENT)
Dept: FAMILY MEDICINE | Facility: CLINIC | Age: 68
End: 2023-01-27

## 2023-02-07 ENCOUNTER — TRANSFERRED RECORDS (OUTPATIENT)
Dept: FAMILY MEDICINE | Facility: CLINIC | Age: 68
End: 2023-02-07

## 2023-02-23 ENCOUNTER — VIRTUAL VISIT (OUTPATIENT)
Dept: FAMILY MEDICINE | Facility: CLINIC | Age: 68
End: 2023-02-23

## 2023-02-23 DIAGNOSIS — I47.10 PAROXYSMAL SUPRAVENTRICULAR TACHYCARDIA (H): ICD-10-CM

## 2023-02-23 DIAGNOSIS — M48.062 SPINAL STENOSIS OF LUMBAR REGION WITH NEUROGENIC CLAUDICATION: ICD-10-CM

## 2023-02-23 DIAGNOSIS — I70.0 ATHEROSCLEROSIS OF AORTA (H): Primary | ICD-10-CM

## 2023-02-23 DIAGNOSIS — L40.50 PSORIATIC ARTHRITIS (H): ICD-10-CM

## 2023-02-23 PROCEDURE — 99214 OFFICE O/P EST MOD 30 MIN: CPT | Mod: GT | Performed by: FAMILY MEDICINE

## 2023-02-23 RX ORDER — HYDROMORPHONE HYDROCHLORIDE 4 MG/1
4 TABLET ORAL AT BEDTIME
Qty: 30 TABLET | Refills: 0 | Status: SHIPPED | OUTPATIENT
Start: 2023-02-23 | End: 2023-03-29

## 2023-02-23 NOTE — PROGRESS NOTES
"    Video Problem(s) Oriented visit      Video Start Time: 1:20 PM    The patient has been notified of following:     \"This video visit will be conducted via a call between you and your physician/provider. We have found that certain health care needs can be provided without the need for an in-person physical exam.  This service lets us provide the care you need with a video conversation.  If a prescription is necessary we can send it directly to your pharmacy.  If lab work is needed we can place an order for that and you can then stop by our lab to have the test done at a later time.    Video visits are billed at different rates depending on your insurance coverage.  Please reach out to your insurance provider with any questions.    If during the course of the call the physician/provider feels a video visit is not appropriate, you will not be charged for this service.\"    Patient has given verbal consent for Video visit? Yes    How would you like to obtain your AVS? MyChart    Will anyone else be joining your video visit? No  SUBJECTIVE:                                                    Mane Chawla is a 67 year old male who presents to clinic today for the following health issues :      Had shoulder replaced in December and is doing well  Doing exercises 6 times a day.  Was on the dilaudid from Dr. Colbert.    Now planning on seeing back doctor to see if can get surgery       Problem list, Medication list, Allergies, and Medical/Social/Surgical histories reviewed in EPIC and updated as appropriate.   Additional history: as documented    ROS:  General and Resp. completed and negative except as noted above    Histories:   Patient Active Problem List   Diagnosis     Psoriatic arthritis (H)     Hyperlipidemia LDL goal <160     ACP (advance care planning)     Midline low back pain without sciatica     Benign essential hypertension     Encounter for long-term (current) use of medications     Nonrheumatic aortic " sclerosis     Spinal stenosis of lumbar region with neurogenic claudication     Atherosclerosis of aorta (H)     Primary osteoarthritis of right knee     Paroxysmal supraventricular tachycardia (H)     Past Surgical History:   Procedure Laterality Date     ARTHROSCOPY KNEE RT/LT       CARPAL TUNNEL RELEASE RT/LT       FUSION CERVICAL ANTERIOR THREE+ LEVELS       FUSION LUMBAR ANTERIOR ONE LEVEL       ROTATOR CUFF REPAIR RT/LT         Social History     Tobacco Use     Smoking status: Former     Packs/day: 1.00     Years: 30.00     Pack years: 30.00     Types: Cigarettes     Quit date: 3/21/1996     Years since quittin.9     Smokeless tobacco: Never   Substance Use Topics     Alcohol use: Yes     Alcohol/week: 8.3 standard drinks     Types: 10 Standard drinks or equivalent per week     Family History   Problem Relation Age of Onset     Cerebrovascular Disease Father      Alzheimer Disease Father      Respiratory Mother            OBJECTIVE:                                                    There were no vitals taken for this visit.  There is no height or weight on file to calculate BMI.   APPEARANCE: = Relaxed and in no distress  Resp effort = Calm regular breathing  Recent/Remote Memory = Alert and Oriented x 3     ASSESSMENT/PLAN:                                                        Diagnoses and all orders for this visit:    Atherosclerosis of aorta (H)  On statin and BP meds.    Spinal stenosis of lumbar region with neurogenic claudication  Pain continues, long term narcotic at stable dose to manage.  Planning to see Spine specialist to see if can fix this as well  -     HYDROmorphone (DILAUDID) 4 MG tablet; Take 1 tablet (4 mg) by mouth At Bedtime    Psoriatic arthritis (H)  actvely managed by rhuematologist and getting infusions.    Paroxysmal supraventricular tachycardia (H)  Metoprolol keeping in good pulse range.        Regular exercise  There are no Patient Instructions on file for this  visit.    The following health maintenance items are reviewed in Epic and correct as of today:  Health Maintenance   Topic Date Due     ADVANCE CARE PLANNING  03/25/2018     MEDICARE ANNUAL WELLNESS VISIT  11/20/2018     DTAP/TDAP/TD IMMUNIZATION (2 - Td or Tdap) 08/03/2019     PHQ-2 (once per calendar year)  01/01/2023     TIFFANIE ASSESSMENT  01/04/2023     URINE DRUG SCREEN  01/04/2023     FALL RISK ASSESSMENT  01/04/2023     PHQ-9  01/04/2023     LIPID  01/26/2026     COLORECTAL CANCER SCREENING  04/13/2031     HEPATITIS C SCREENING  Completed     INFLUENZA VACCINE  Completed     Pneumococcal Vaccine: 65+ Years  Completed     ZOSTER IMMUNIZATION  Completed     AORTIC ANEURYSM SCREENING (SYSTEM ASSIGNED)  Completed     COVID-19 Vaccine  Completed     IPV IMMUNIZATION  Aged Out     MENINGITIS IMMUNIZATION  Aged Out       Video-Visit Details    This visit is being conducted as a virtual visit due to the emphasis on mitigation of the COVID-19 virus pandemic.   Type of service:  Video Visit    Originating Location (pt. Location): Home    Distant Location (provider location):  [facility]     Platform used for Video Visit: Unable to connect with video, audio only available    Filemon Magana MD  [facility]  Family Practice  [facility]  [phone]    Video End Time:1:27 PM  For any issues my office # is [phone]

## 2023-03-26 ENCOUNTER — HEALTH MAINTENANCE LETTER (OUTPATIENT)
Age: 68
End: 2023-03-26

## 2023-03-29 DIAGNOSIS — M48.062 SPINAL STENOSIS OF LUMBAR REGION WITH NEUROGENIC CLAUDICATION: ICD-10-CM

## 2023-03-29 RX ORDER — HYDROMORPHONE HYDROCHLORIDE 4 MG/1
4 TABLET ORAL AT BEDTIME
Qty: 30 TABLET | Refills: 0 | Status: SHIPPED | OUTPATIENT
Start: 2023-03-29 | End: 2023-05-10

## 2023-05-10 ENCOUNTER — OFFICE VISIT (OUTPATIENT)
Dept: FAMILY MEDICINE | Facility: CLINIC | Age: 68
End: 2023-05-10

## 2023-05-10 VITALS
DIASTOLIC BLOOD PRESSURE: 86 MMHG | HEART RATE: 81 BPM | BODY MASS INDEX: 27.62 KG/M2 | SYSTOLIC BLOOD PRESSURE: 130 MMHG | OXYGEN SATURATION: 95 % | HEIGHT: 67 IN | WEIGHT: 176 LBS

## 2023-05-10 DIAGNOSIS — M48.062 SPINAL STENOSIS OF LUMBAR REGION WITH NEUROGENIC CLAUDICATION: ICD-10-CM

## 2023-05-10 DIAGNOSIS — F11.20 CONTINUOUS OPIOID DEPENDENCE (H): Primary | ICD-10-CM

## 2023-05-10 DIAGNOSIS — E78.2 MIXED HYPERLIPIDEMIA: ICD-10-CM

## 2023-05-10 DIAGNOSIS — I10 BENIGN ESSENTIAL HYPERTENSION: ICD-10-CM

## 2023-05-10 DIAGNOSIS — M62.838 MUSCLE SPASM: ICD-10-CM

## 2023-05-10 PROCEDURE — 99214 OFFICE O/P EST MOD 30 MIN: CPT | Performed by: FAMILY MEDICINE

## 2023-05-10 RX ORDER — HYDROMORPHONE HYDROCHLORIDE 4 MG/1
4 TABLET ORAL AT BEDTIME
Qty: 30 TABLET | Refills: 0 | Status: SHIPPED | OUTPATIENT
Start: 2023-05-10 | End: 2023-06-21

## 2023-05-10 RX ORDER — DIAZEPAM 5 MG
TABLET ORAL
Qty: 30 TABLET | Refills: 1 | Status: SHIPPED | OUTPATIENT
Start: 2023-05-10 | End: 2024-01-30

## 2023-05-10 RX ORDER — AMOXICILLIN 500 MG/1
CAPSULE ORAL
COMMUNITY
Start: 2023-04-12

## 2023-05-10 ASSESSMENT — PATIENT HEALTH QUESTIONNAIRE - PHQ9
5. POOR APPETITE OR OVEREATING: NOT AT ALL
SUM OF ALL RESPONSES TO PHQ QUESTIONS 1-9: 6

## 2023-05-10 ASSESSMENT — ANXIETY QUESTIONNAIRES
1. FEELING NERVOUS, ANXIOUS, OR ON EDGE: NOT AT ALL
7. FEELING AFRAID AS IF SOMETHING AWFUL MIGHT HAPPEN: NOT AT ALL
5. BEING SO RESTLESS THAT IT IS HARD TO SIT STILL: NOT AT ALL
GAD7 TOTAL SCORE: 0
2. NOT BEING ABLE TO STOP OR CONTROL WORRYING: NOT AT ALL
6. BECOMING EASILY ANNOYED OR IRRITABLE: NOT AT ALL
GAD7 TOTAL SCORE: 0
3. WORRYING TOO MUCH ABOUT DIFFERENT THINGS: NOT AT ALL
IF YOU CHECKED OFF ANY PROBLEMS ON THIS QUESTIONNAIRE, HOW DIFFICULT HAVE THESE PROBLEMS MADE IT FOR YOU TO DO YOUR WORK, TAKE CARE OF THINGS AT HOME, OR GET ALONG WITH OTHER PEOPLE: NOT DIFFICULT AT ALL

## 2023-05-10 NOTE — PROGRESS NOTES
.  Problem(s) Oriented visit        SUBJECTIVE:                                                    Mane Chawla is a 67 year old male who presents to clinic today for the following health issues :    Pain History:  When did you first notice your pain? Years ago   Have you seen this provider for your pain in the past?   Yes   Where in your body do you have pain? Back   Are you seeing anyone else for your pain? Yes - spine surgeon        3/28/2019    11:08 AM 1/4/2022     1:18 PM 5/10/2023    12:05 PM   PHQ-9 SCORE   PHQ-9 Total Score 5 6 6           3/28/2019    11:08 AM 1/4/2022     1:18 PM 5/10/2023    12:05 PM   TIFFANIE-7 SCORE   Total Score 1 2 0               Chronic Pain Follow Up:    Location of pain: back  Analgesia/pain control:    - Recent changes:  none    - Overall control: Tolerable with discomfort    - Current treatments: see meds   Adherence:     - Do you ever take more pain medicine than prescribed? No    - When did you take your last dose of pain medicine?  yesterday   Adverse effects: No   PDMP Review       Value Time User    State PDMP site checked  Yes 5/10/2023 12:18 PM Filemon Magana MD        Last CSA Agreement:   CSA -- Patient Level:     [Media Unavailable] Controlled Substance Agreement - Opioid - Scan on 1/4/2022  5:18 PM: CONTROLLED SUBSTANCE AGREEMENT 01/04/22   [Media Unavailable] Controlled Substance Agreement - Opioid - Scan on 12/10/2020  2:07 PM: CONTROLLED SUBSTANCE AGREEMENT FORM 12/10/20       Last UDS:   Patient declined to complete   today            1. Muscle spasm      2. Benign essential hypertension      3. Spinal stenosis of lumbar region with neurogenic claudication         Problem list, Medication list, Allergies, and Medical/Social/Surgical histories reviewed in [facility] and updated as appropriate.   Additional history: as documented    ROS:  General and Resp. completed and negative except as noted above    Histories:   Patient Active Problem List   Diagnosis      "Psoriatic arthritis (H)     Hyperlipidemia LDL goal <160     ACP (advance care planning)     Midline low back pain without sciatica     Benign essential hypertension     Encounter for long-term (current) use of medications     Nonrheumatic aortic sclerosis     Spinal stenosis of lumbar region with neurogenic claudication     Atherosclerosis of aorta (H)     Primary osteoarthritis of right knee     Paroxysmal supraventricular tachycardia (H)     F11.2 - Continuous opioid dependence (H)     Past Surgical History:   Procedure Laterality Date     ARTHROSCOPY KNEE RT/LT       CARPAL TUNNEL RELEASE RT/LT       FUSION CERVICAL ANTERIOR THREE+ LEVELS       FUSION LUMBAR ANTERIOR ONE LEVEL       ROTATOR CUFF REPAIR RT/LT         Social History     Tobacco Use     Smoking status: Former     Packs/day: 1.00     Years: 30.00     Pack years: 30.00     Types: Cigarettes     Quit date: 3/21/1996     Years since quittin.1     Smokeless tobacco: Never   Vaping Use     Vaping status: Not on file   Substance Use Topics     Alcohol use: Yes     Alcohol/week: 8.3 standard drinks of alcohol     Types: 10 Standard drinks or equivalent per week     Family History   Problem Relation Age of Onset     Cerebrovascular Disease Father      Alzheimer Disease Father      Respiratory Mother            OBJECTIVE:                                                    /86   Pulse 81   Ht 1.702 m (5' 7\")   Wt 79.8 kg (176 lb)   SpO2 95%   BMI 27.57 kg/m    Body mass index is 27.57 kg/m .   APPEARANCE: = alert and mild distress  Musculsktl: decreased range of motion of the spine  Recent/Remote Memory = Alert and Oriented x 3     ASSESSMENT/PLAN:                                                        Mane was seen today for recheck medication.    Diagnoses and all orders for this visit:      Mixed hyperlipidemia  Hyperlipidemia  Discussed current lipid results, previous results (if available) current guidelines (NCEP) for treatment and " goals for lipids.    Discussed ongoing lifestyle modification, dietary changes (low fat, low simple carb) and regular aerobic exercise.    Discussed the link between dysmetabolic syndrome and impaired glucose tolerance seen in certain patterns of lipids.     Reviewed medication use for lipid lowering, including the statins are their possible side effects of myalgias, rhabdomyolysis, and liver toxicity.  We today managed his prescriptions with refills ensured to ensure availabilty of current medications.  Discussed the importance for aggressive management of dyslipidemia to prevent vascular complications later.     Instructed to contact me if he develop any intolerance to the treatment.        Benign essential hypertension  Controlled     Spinal stenosis of lumbar region with neurogenic claudication  With Continuous opioid dependence (H)  Reviewed his pain history.  Has had long standing pain in all of his major joints.  Has seen Orthopedists and Rheumatologists and has been told there is nothing that can be done.  His joints hurt all the time.   He has been referred to the  Pain Clinic, but still prefers just to see me.   We have tried to titrate down on his current doses of pain medications, but each time this has caused significant worsening of pain and significant decrease in functionality.   He has been very good in following instructions regarding pain med use, follow up appointments.  There has never been any signs of inappropriate use of these medications or irregularity regarding refills.    -     HYDROmorphone (DILAUDID) 4 MG tablet; Take 1 tablet (4 mg) by mouth At Bedtime    Muscle spasm rarely will take-     diazepam (VALIUM) 5 MG tablet; TAKE 0.5 TABLET BY MOUTH AT BEDTIME AS NEEDED FOR severe MUSCLE SPASMS    seeing Surgeon for possible intervention for back,  Just had an MRI.      See Patient Instructions  There are no Patient Instructions on file for this visit.    The following health maintenance  items are reviewed in Epic and correct as of today:  Health Maintenance   Topic Date Due     ADVANCE CARE PLANNING  03/25/2018     MEDICARE ANNUAL WELLNESS VISIT  11/20/2018     DTAP/TDAP/TD IMMUNIZATION (2 - Td or Tdap) 08/03/2019     URINE DRUG SCREEN  01/04/2023     FALL RISK ASSESSMENT  01/04/2023     TIFFANIE ASSESSMENT  05/10/2024     PHQ-9  05/10/2024     LIPID  01/26/2026     COLORECTAL CANCER SCREENING  04/13/2031     HEPATITIS C SCREENING  Completed     PHQ-2 (once per calendar year)  Completed     INFLUENZA VACCINE  Completed     Pneumococcal Vaccine: 65+ Years  Completed     ZOSTER IMMUNIZATION  Completed     AORTIC ANEURYSM SCREENING (SYSTEM ASSIGNED)  Completed     COVID-19 Vaccine  Completed     IPV IMMUNIZATION  Aged Out     MENINGITIS IMMUNIZATION  Aged Out       Filemon Magana MD  [facility]  Family Practice  [facility]  [phone]    For any issues my office # is [phone]

## 2023-06-17 DIAGNOSIS — I10 BENIGN ESSENTIAL HYPERTENSION: ICD-10-CM

## 2023-06-18 RX ORDER — LISINOPRIL 10 MG/1
TABLET ORAL
Qty: 90 TABLET | Refills: 3 | Status: SHIPPED | OUTPATIENT
Start: 2023-06-18 | End: 2023-12-21 | Stop reason: DRUGHIGH

## 2023-06-20 DIAGNOSIS — M48.062 SPINAL STENOSIS OF LUMBAR REGION WITH NEUROGENIC CLAUDICATION: ICD-10-CM

## 2023-06-20 NOTE — TELEPHONE ENCOUNTER
Refill request for hydromorphone 4mg, si tab PO at bedtime #30 for 30 days. Last office visit 5/10/23 to address pain, will be due in August for 3 month medication check. CSA signed and tox screen done 2022. [place]  checked [place] history below. Refill routed to Dr Magana for review. Becky Williamson

## 2023-06-21 RX ORDER — HYDROMORPHONE HYDROCHLORIDE 4 MG/1
4 TABLET ORAL AT BEDTIME
Qty: 30 TABLET | Refills: 0 | Status: SHIPPED | OUTPATIENT
Start: 2023-06-21 | End: 2023-12-21

## 2023-07-10 ENCOUNTER — OFFICE VISIT (OUTPATIENT)
Dept: FAMILY MEDICINE | Facility: CLINIC | Age: 68
End: 2023-07-10

## 2023-07-10 VITALS
HEART RATE: 66 BPM | BODY MASS INDEX: 27.13 KG/M2 | TEMPERATURE: 98 F | OXYGEN SATURATION: 96 % | WEIGHT: 173.2 LBS | SYSTOLIC BLOOD PRESSURE: 122 MMHG | DIASTOLIC BLOOD PRESSURE: 79 MMHG

## 2023-07-10 DIAGNOSIS — M48.062 SPINAL STENOSIS OF LUMBAR REGION WITH NEUROGENIC CLAUDICATION: ICD-10-CM

## 2023-07-10 DIAGNOSIS — Z87.891 SMOKING HISTORY: ICD-10-CM

## 2023-07-10 DIAGNOSIS — L40.50 PSORIATIC ARTHRITIS (H): ICD-10-CM

## 2023-07-10 DIAGNOSIS — I10 BENIGN ESSENTIAL HYPERTENSION: ICD-10-CM

## 2023-07-10 DIAGNOSIS — Z01.818 PREOP GENERAL PHYSICAL EXAM: Primary | ICD-10-CM

## 2023-07-10 DIAGNOSIS — R39.9 LOWER URINARY TRACT SYMPTOMS (LUTS): ICD-10-CM

## 2023-07-10 DIAGNOSIS — E78.5 HYPERLIPIDEMIA LDL GOAL <160: ICD-10-CM

## 2023-07-10 DIAGNOSIS — I47.10 PAROXYSMAL SUPRAVENTRICULAR TACHYCARDIA (H): ICD-10-CM

## 2023-07-10 DIAGNOSIS — F11.20 CONTINUOUS OPIOID DEPENDENCE (H): ICD-10-CM

## 2023-07-10 DIAGNOSIS — I35.8 NONRHEUMATIC AORTIC SCLEROSIS: ICD-10-CM

## 2023-07-10 DIAGNOSIS — I70.0 ATHEROSCLEROSIS OF AORTA (H): ICD-10-CM

## 2023-07-10 LAB
BACTERIA (RMG): NORMAL
BILIRUBIN (RMG): NEGATIVE
BLOOD (RMG): NEGATIVE
CASTS (RMG): NORMAL
COLOR UR: YELLOW
CRYSTAL (RMG): NORMAL
EPITHELIAL (RMG): NORMAL
GLUCOSE (RMG): NEGATIVE
INR PPP: 1
KETONE (RMG): NEGATIVE
LEUKOCYTE (RMG): NEGATIVE
MUCOUS (RMG): NORMAL
NITRITE (RMG): NEGATIVE
PH UR STRIP: 7 PH (ref 5–7)
PROTEIN (RMG): NEGATIVE
RBC (RMG): NORMAL
SP GR UR STRIP: 1.01
UROBILINOGEN (RMG): 0.2
WBC (RMG): NORMAL

## 2023-07-10 PROCEDURE — 85610 PROTHROMBIN TIME: CPT | Performed by: FAMILY MEDICINE

## 2023-07-10 PROCEDURE — 99215 OFFICE O/P EST HI 40 MIN: CPT | Performed by: FAMILY MEDICINE

## 2023-07-10 PROCEDURE — 81003 URINALYSIS AUTO W/O SCOPE: CPT | Mod: QW | Performed by: FAMILY MEDICINE

## 2023-07-10 PROCEDURE — 36415 COLL VENOUS BLD VENIPUNCTURE: CPT | Performed by: FAMILY MEDICINE

## 2023-07-10 PROCEDURE — 93000 ELECTROCARDIOGRAM COMPLETE: CPT | Performed by: FAMILY MEDICINE

## 2023-07-10 NOTE — PROGRESS NOTES
[facility] [address] [place] [address]  Phone: [phone]  Fax: [phone]  Primary Provider: Magaly Magana  Pre-op Performing Provider: MAGALY MAGANA      PREOPERATIVE EVALUATION:  Today's date: 7/10/2023    Mane Chawla is a 67 year old male who presents for a preoperative evaluation.    Surgical Information:  Surgery/Procedure: Decompression-Laminectomy L2-4 Bilateral   Surgery Location: [facility]   Surgeon: Marcia Forte  Surgery Date: 7/17/23  Time of Surgery: 8:30  Where patient plans to recover: At home with family  Fax number for surgical facility: Note does not need to be faxed, will be available electronically in Epic. JUST IN CASE [phone]    Assessment & Plan     The proposed surgical procedure is considered INTERMEDIATE risk.    Preop general physical exam  Prior to planned repair to alleviate Spinal stenosis of lumbar region with neurogenic claudication  Has had 5 years of pain and been through multiple PT treatments with no resolution.    Lower urinary tract symptoms (LUTS)  Pain in the epigastric region that is mild, wonders if could have UTI,   Urinalysis done here today is negative    Psoriatic arthritis (H)  On every other month infusions..    Hyperlipidemia LDL goal <160  On mediaciaio    Benign essential hypertension  Recent good control  - EKG 12-lead complete w/read - Clinics  - Basic Metabolic Panel (8) ([facility])  - Prothrombin - INR ([facility])    Atherosclerosis of aorta (H)  On medications   - Prothrombin - INR ([facility])    Paroxysmal supraventricular tachycardia (H)  No recent palpitations.  - Prothrombin - INR ([facility])    F11.2 - Continuous opioid dependence (H)  Has been weaning down on his maintinence dose after shoudler surgery relieved most of his shoulder pains.    Smoking history  I don't find that he has been screened for an Aortic aneurism..  I will order  - Radiology Referral            - No identified additional risk  factors other than previously addressed    Antiplatelet or Anticoagulation Medication Instructions:   - aspirin: Discontinue aspirin 7-10 days prior to procedure to reduce bleeding risk. It should be resumed postoperatively.     Additional Medication Instructions:   - ACE/ARB: HOLD on day of surgery (minimum 11 hours for general anesthesia).   - Beta Blockers: Continue taking on the day of surgery.   - Diuretics: HOLD on the day of surgery.      RECOMMENDATION:  APPROVAL GIVEN to proceed with proposed procedure, without further diagnostic evaluation.    Review of prior external note(s) from - Outside records from    [facility] data to display   Time spent by me doing chart review, history and exam, documentation and further activities per the note      Subjective       HPI related to upcoming procedure: Lumbar Stenosis with first surgery 45 years ago had lower back had laminectomy and fustion, then 5 years ago pain began with injury lifting a dock  Pain was complicated by ongoing shoulder pain recently repaired.  Has been weaning his narcotics         7/9/2023     4:25 PM   Preop Questions   1. Have you ever had a heart attack or stroke? No   2. Have you ever had surgery on your heart or blood vessels, such as a stent placement, a coronary artery bypass, or surgery on an artery in your head, neck, heart, or legs? No   3. Do you have chest pain with activity? No   4. Do you have a history of  heart failure? No   5. Do you currently have a cold, bronchitis or symptoms of other infection? No   6. Do you have a cough, shortness of breath, or wheezing? No   7. Do you or anyone in your family have previous history of blood clots? No   8. Do you or does anyone in your family have a serious bleeding problem such as prolonged bleeding following surgeries or cuts? No   9. Have you ever had problems with anemia or been told to take iron pills? No   10. Have you had any abnormal blood loss such as black, tarry or bloody stools? No    11. Have you ever had a blood transfusion? No   12. Are you willing to have a blood transfusion if it is medically needed before, during, or after your surgery? Yes   13. Have you or any of your relatives ever had problems with anesthesia? No   14. Do you have sleep apnea, excessive snoring or daytime drowsiness? YES - Daytime    14a. Do you have a CPAP machine? No   15. Do you have any artifical heart valves or other implanted medical devices like a pacemaker, defibrillator, or continuous glucose monitor? No   16. Do you have artificial joints? YES - right shoulder    17. Are you allergic to latex? No       Health Care Directive:  Patient does not have a Health Care Directive or Living Will: As is reasonable care for most folks, In the short term, he wants usual aggressive medical care.   No desire for long term prolongation of life through artificial means if no hope to bring back to a reasonable status.      Preoperative Review of :   reviewed - controlled substances reflected in medication list.      Status of Chronic Conditions:  See problem list for active medical problems.  Problems all longstanding and stable, except as noted/documented.  See ROS for pertinent symptoms related to these conditions.      Review of Systems  Constitutional, neuro, ENT, endocrine, pulmonary, cardiac, gastrointestinal, genitourinary, musculoskeletal, integument and psychiatric systems are negative, except as otherwise noted.    Patient Active Problem List    Diagnosis Date Noted     F11.2 - Continuous opioid dependence (H) 05/10/2023     Priority: Medium     Paroxysmal supraventricular tachycardia (H) 09/07/2022     Priority: Medium     Primary osteoarthritis of right knee 05/14/2021     Priority: Medium     Atherosclerosis of aorta (H) 01/26/2021     Priority: Medium     Nonrheumatic aortic sclerosis 10/10/2019     Priority: Medium     Spinal stenosis of lumbar region with neurogenic claudication 10/10/2019     Priority:  Medium     Encounter for long-term (current) use of medications 03/13/2018     Priority: Medium     Benign essential hypertension 12/04/2017     Priority: Medium     ACP (advance care planning)      Priority: Medium     Hyperlipidemia LDL goal <160 06/15/2011     Priority: Medium     Psoriatic arthritis (H)      Priority: Medium      Past Medical History:   Diagnosis Date     ACP (advance care planning) 3-25-13    form given     GERD (gastroesophageal reflux disease)      HTN, goal below 140/80      Hypercholesterolemia      Hyperlipidemia LDL goal <160 6/15/2011     Psoriatic arthritis (H)      Past Surgical History:   Procedure Laterality Date     ARTHROSCOPY KNEE RT/LT       CARPAL TUNNEL RELEASE RT/LT       FUSION CERVICAL ANTERIOR THREE+ LEVELS       FUSION LUMBAR ANTERIOR ONE LEVEL       HC SHOULDER ARTHROSCOPY,PARTIAL DEBRIDEMENT Right 2023    Unable to due total shoulder     ROTATOR CUFF REPAIR RT/LT       Current Outpatient Medications   Medication Sig Dispense Refill     amoxicillin (AMOXIL) 500 MG capsule TAKE ONE CAPSULE BY MOUTH EVERY 6 HOURS UNTIL ALL TAKEN       aspirin (ASA) 81 MG EC tablet Take 1 tablet (81 mg) by mouth daily       CIMETIDINE ACID REDUCER PO Take 1 tablet by mouth daily as needed        diazepam (VALIUM) 5 MG tablet TAKE 0.5 TABLET BY MOUTH AT BEDTIME AS NEEDED FOR severe MUSCLE SPASMS 30 tablet 1     fish oil-omega-3 fatty acids 1000 MG capsule Take 2 g by mouth daily       hydrochlorothiazide (HYDRODIURIL) 50 MG tablet TAKE 1 TABLET(50 MG) BY MOUTH DAILY 90 tablet 3     HYDROmorphone (DILAUDID) 4 MG tablet Take 1 tablet (4 mg) by mouth At Bedtime 30 tablet 0     Ibuprofen (ADVIL PO) Take 800 mg by mouth 4 times daily        ibuprofen (ADVIL/MOTRIN) 800 MG tablet Take 1 tablet (800 mg) by mouth every 8 hours as needed for moderate pain 60 tablet 11     lisinopril (ZESTRIL) 10 MG tablet TAKE 1 TABLET(10 MG) BY MOUTH DAILY 90 tablet 3     metoprolol succinate ER (TOPROL XL) 50 MG 24  "hr tablet Take 1 tablet (50 mg) by mouth daily 90 tablet 3     Multiple Vitamin (MULTI-VITAMINS) TABS Take 1 tablet by mouth       nystatin-triamcinolone (MYCOLOG II) 494640-6.1 UNIT/GM-% external cream Apply topically 2 times daily as needed (skin irritation) 15 g 4     omeprazole (PRILOSEC) 10 MG DR capsule Take 20 mg by mouth every morning        Turmeric 500 MG TABS Take by mouth daily          Allergies   Allergen Reactions     Lisinopril Fatigue     Cipro [Quinolones] Other (See Comments)     Achy joints     Codeine Nausea     per h&p     Codeine Camsylate Nausea     Fluocinolone Other (See Comments)     \"achy joints\"     Statins Other (See Comments)     As provastatin- fatigue  On rovustatin  As of 10/1/14        Social History     Tobacco Use     Smoking status: Former     Packs/day: 1.00     Years: 30.00     Pack years: 30.00     Types: Cigarettes     Quit date: 3/21/1996     Years since quittin.3     Smokeless tobacco: Never   Substance Use Topics     Alcohol use: Yes     Alcohol/week: 8.3 standard drinks of alcohol     Types: 10 Standard drinks or equivalent per week     Family History   Problem Relation Age of Onset     Respiratory Mother      Cerebrovascular Disease Father      Alzheimer Disease Father      History   Drug Use No         Objective     /79   Pulse 66   Temp 98  F (36.7  C) (Oral)   Wt 78.6 kg (173 lb 3.2 oz)   SpO2 96%   BMI 27.13 kg/m      Physical Exam    GENERAL APPEARANCE: healthy, alert and no distress     EYES: EOMI,  PERRL     HENT: ear canals and TM's normal and nose and mouth without ulcers or lesions     NECK: no adenopathy, no asymmetry, masses, or scars and thyroid normal to palpation     RESP: lungs clear to auscultation - no rales, rhonchi or wheezes     CV: regular rates and rhythm, normal S1 S2, no S3 or S4 and no murmur, click or rub     ABDOMEN:  soft, nontender, no HSM or masses and bowel sounds normal     MS: extremities normal- no gross deformities " noted and decreased rom in left leg     SKIN: no suspicious lesions or rashes     NEURO: Normal strength and tone, sensory exam grossly normal, mentation intact and speech normal     PSYCH: mentation appears normal. and affect normal/bright     LYMPHATICS: No cervical adenopathy    Recent Labs   Lab Test 10/06/22  1315 03/01/22  1159 03/01/22  0000   HGB  --   --  13.3*   PLT  --   --  301    137  --    POTASSIUM 4.4 4.3  --    CR 1.10 0.88  --         Diagnostics:  Labs pending at this time.  Results will be reviewed when available.   EKG: sinus bradycardia, normal axis, normal intervals, no acute ST/T changes c/w ischemia, no LVH by voltage criteria, unchanged from previous tracings    Revised Cardiac Risk Index (RCRI):  The patient has the following serious cardiovascular risks for perioperative complications:   - No serious cardiac risks = 0 points     RCRI Interpretation: 0 points: Class I (very low risk - 0.4% complication rate)           Signed Electronically by: Filemon Magana MD  Copy of this evaluation report is provided to requesting physician.

## 2023-07-10 NOTE — PATIENT INSTRUCTIONS
For informational purposes only. Not to replace the advice of your health care provider. Copyright   2003,  [employer] [facility]. All rights reserved. Clinically reviewed by Clarissa Ram MD. [employer] [id number] - REV .  Preparing for Your Surgery  Getting started  A nurse will call you to review your health history and instructions. They will give you an arrival time based on your scheduled surgery time. Please be ready to share:  Your doctor's clinic name and phone number  Your medical, surgical, and anesthesia history  A list of allergies and sensitivities  A list of medicines, including herbal treatments and over-the-counter drugs  Whether the patient has a legal guardian (ask how to send us the papers in advance)  Please tell us if you're pregnant--or if there's any chance you might be pregnant. Some surgeries may injure a fetus (unborn baby), so they require a pregnancy test. Surgeries that are safe for a fetus don't always need a test, and you can choose whether to have one.   If you have a child who's having surgery, please ask for a copy of Preparing for Your Child's Surgery.    Preparing for surgery  Within 10 to 30 days of surgery: Have a pre-op exam (sometimes called an H&P, or History and Physical). This can be done at a clinic or pre-operative center.  If you're having a , you may not need this exam. Talk to your care team.  At your pre-op exam, talk to your care team about all medicines you take. If you need to stop any medicines before surgery, ask when to start taking them again.  We do this for your safety. Many medicines can make you bleed too much during surgery. Some change how well surgery (anesthesia) drugs work.  Call your insurance company to let them know you're having surgery. (If you don't have insurance, call [phone].)  Call your clinic if there's any change in your health. This includes signs of a cold or flu (sore throat, runny nose, cough, rash, fever). It  also includes a scrape or scratch near the surgery site.  If you have questions on the day of surgery, call your hospital or surgery center.  Eating and drinking guidelines  For your safety: Unless your surgeon tells you otherwise, follow the guidelines below.  Eat and drink as usual until 8 hours before you arrive for surgery. After that, no food or milk.  Drink clear liquids until 2 hours before you arrive. These are liquids you can see through, like water, Gatorade, and Propel Water. They also include plain black coffee and tea (no cream or milk), candy, and breath mints. You can spit out gum when you arrive.  If you drink alcohol: Stop drinking it the night before surgery.  If your care team tells you to take medicine on the morning of surgery, it's okay to take it with a sip of water.  Preventing infection  Shower or bathe the night before and morning of your surgery. Follow the instructions your clinic gave you. (If no instructions, use regular soap.)  Don't shave or clip hair near your surgery site. We'll remove the hair if needed.  Don't smoke or vape the morning of surgery. You may chew nicotine gum up to 2 hours before surgery. A nicotine patch is okay.  Note: Some surgeries require you to completely quit smoking and nicotine. Check with your surgeon.  Your care team will make every effort to keep you safe from infection. We will:  Clean our hands often with soap and water (or an alcohol-based hand rub).  Clean the skin at your surgery site with a special soap that kills germs.  Give you a special gown to keep you warm. (Cold raises the risk of infection.)  Wear special hair covers, masks, gowns and gloves during surgery.  Give antibiotic medicine, if prescribed. Not all surgeries need antibiotics.  What to bring on the day of surgery  Photo ID and insurance card  Copy of your health care directive, if you have one  Glasses and hearing aids (bring cases)  You can't wear contacts during surgery  Inhaler and  eye drops, if you use them (tell us about these when you arrive)  CPAP machine or breathing device, if you use them  A few personal items, if spending the night  If you have . . .  A pacemaker, ICD (cardiac defibrillator) or other implant: Bring the ID card.  An implanted stimulator: Bring the remote control.  A legal guardian: Bring a copy of the certified (court-stamped) guardianship papers.  Please remove any jewelry, including body piercings. Leave jewelry and other valuables at home.  If you're going home the day of surgery  You must have a responsible adult drive you home. They should stay with you overnight as well.  If you don't have someone to stay with you, and you aren't safe to go home alone, we may keep you overnight. Insurance often won't pay for this.  After surgery  If it's hard to control your pain or you need more pain medicine, please call your surgeon's office.  Questions?   If you have any questions for your care team, list them here: _________________________________________________________________________________________________________________________________________________________________________ ____________________________________ ____________________________________ ____________________________________    How to Take Your Medication Before Surgery  Additional Medication Instructions:   - Lisinopril   ACE/ARB: HOLD on day of surgery (minimum 11 hours for general anesthesia).   - Metoprolol Beta Blockers: Continue taking on the day of surgery.   - Hydrochlorathiazide  Diuretics: HOLD on the day of surgery.

## 2023-07-11 LAB
BUN SERPL-MCNC: 15 MG/DL (ref 8–27)
BUN/CREATININE RATIO: 13 (ref 10–24)
CALCIUM SERPL-MCNC: 9.3 MG/DL (ref 8.6–10.2)
CHLORIDE SERPLBLD-SCNC: 102 MMOL/L (ref 96–106)
CREAT SERPL-MCNC: 1.12 MG/DL (ref 0.76–1.27)
EGFR: 71 ML/MIN/1.73
GLUCOSE SERPL-MCNC: 102 MG/DL (ref 70–99)
POTASSIUM SERPL-SCNC: 4.3 MMOL/L (ref 3.5–5.2)
SODIUM SERPL-SCNC: 137 MMOL/L (ref 134–144)
TOTAL CO2: 20 MMOL/L (ref 20–29)

## 2023-07-13 NOTE — PROGRESS NOTES
7/11/23 faxed this preop, EKG and labs to FIDELIAW @ [phone]    Nacho Palacios,   [facility]  [phone]

## 2023-09-22 DIAGNOSIS — I10 BENIGN ESSENTIAL HYPERTENSION: ICD-10-CM

## 2023-09-22 NOTE — CONFIDENTIAL NOTE
Med: HCTZ    LOV (related): 7/10/23 - PREOP    Last Lab: 7/10/23      Due for F/U around: OVERDUE FOR AWV    Next Appt: NONE        BP Readings from Last 3 Encounters:   07/10/23 122/79   05/10/23 130/86   12/01/22 (!) 142/70       Last Comprehensive Metabolic Panel:  Lab Results   Component Value Date     07/10/2023    POTASSIUM 4.3 07/10/2023    CHLORIDE 102 07/10/2023     (H) 07/10/2023    BUN 15 07/10/2023    BUN 13 07/10/2023    CR 1.12 07/10/2023    LEIGHA 9.3 07/10/2023

## 2023-09-23 RX ORDER — HYDROCHLOROTHIAZIDE 50 MG/1
50 TABLET ORAL DAILY
Qty: 154 TABLET | Refills: 0 | Status: SHIPPED | OUTPATIENT
Start: 2023-09-23 | End: 2024-01-01

## 2023-10-25 ENCOUNTER — OFFICE VISIT (OUTPATIENT)
Dept: FAMILY MEDICINE | Facility: CLINIC | Age: 68
End: 2023-10-25

## 2023-10-25 VITALS
BODY MASS INDEX: 27.57 KG/M2 | WEIGHT: 176 LBS | DIASTOLIC BLOOD PRESSURE: 63 MMHG | SYSTOLIC BLOOD PRESSURE: 127 MMHG | HEART RATE: 70 BPM | OXYGEN SATURATION: 99 %

## 2023-10-25 DIAGNOSIS — N40.1 BENIGN PROSTATIC HYPERPLASIA WITH NOCTURIA: Primary | ICD-10-CM

## 2023-10-25 DIAGNOSIS — M62.830 BACK MUSCLE SPASM: ICD-10-CM

## 2023-10-25 DIAGNOSIS — R35.1 BENIGN PROSTATIC HYPERPLASIA WITH NOCTURIA: Primary | ICD-10-CM

## 2023-10-25 PROCEDURE — 99214 OFFICE O/P EST MOD 30 MIN: CPT | Mod: 25 | Performed by: FAMILY MEDICINE

## 2023-10-25 PROCEDURE — G0008 ADMIN INFLUENZA VIRUS VAC: HCPCS | Mod: 59 | Performed by: FAMILY MEDICINE

## 2023-10-25 PROCEDURE — 90694 VACC AIIV4 NO PRSRV 0.5ML IM: CPT | Performed by: FAMILY MEDICINE

## 2023-10-25 RX ORDER — CYCLOBENZAPRINE HCL 5 MG
5 TABLET ORAL DAILY PRN
Qty: 90 TABLET | Refills: 1 | Status: SHIPPED | OUTPATIENT
Start: 2023-10-25 | End: 2024-08-05

## 2023-10-25 NOTE — PROGRESS NOTES
He complains of urinary hesitancy, weak stream, double and incomplete voiding, intermittent urinary frequency and nocturia times 5. AUA Bother Score is 23.    AFTER back surgery still getting spasms...  Problem(s) Oriented visit      ROS:  General and Resp. completed and negative except as noted above     HISTORY:   reports current alcohol use of about 8.3 standard drinks of alcohol per week.   reports that he quit smoking about 27 years ago. His smoking use included cigarettes. He has a 30.00 pack-year smoking history. He has never used smokeless tobacco.    See chart for additional current history and problem list    EXAM:  BP: 127/63   Pulse: 70    Temp: Data Unavailable    Wt Readings from Last 2 Encounters:   10/25/23 79.8 kg (176 lb)   07/10/23 78.6 kg (173 lb 3.2 oz)       BMI= Body mass index is 27.57 kg/m .    EXAM:  APPEARANCE: = Relaxed and in no distress  Thyroid = Not enlarged and no masses felt  Breath Sounds = Good air movement with no rales or rhonchi in any lung fields  Heart Rate, Rythym, & sounds (no Murm)  = Regular rate and rythym with no S3, S4, or murmer appreciated.  Musculsktl =  Strength and ROM of major joints are within normal limits  Recent/Remote Memory = Alert and Oriented x 3      Assessment/Plan:  Alexandro was seen today for recheck medication, referral and musculoskeletal problem.    Diagnoses and all orders for this visit:    Benign prostatic hyperplasia with nocturia  These symptoms sound most consistent with bladder irritation from an enlarged prostate (BPH).  Will make sure the PSA is normal and up to date.    Discussed treatment options for this including lifestyle changes ( i.e. restricting water intake close to bedtime or long trips, avoiding caffeine, etc.), medications (including FLOMAX, proscar, saw palmetto, etc), and referral to Urology.    -     Adult Urology  Referral; Future    Back muscle spasm  Spasms try relaxent until further surgery is scheduel-      "cyclobenzaprine (FLEXERIL) 5 MG tablet; Take 1 tablet (5 mg) by mouth daily as needed for muscle spasms    Other orders  -     INFLUENZA VACCINE 65+ (FLUAD)        COUNSELING:   reports that he quit smoking about 27 years ago. His smoking use included cigarettes. He has a 30.00 pack-year smoking history. He has never used smokeless tobacco.    Estimated body mass index is 27.57 kg/m  as calculated from the following:    Height as of 5/10/23: 1.702 m (5' 7\").    Weight as of this encounter: 79.8 kg (176 lb).       Appropriate preventive services were discussed with this patient, including applicable screening as appropriate for cardiovascular disease, diabetes, osteopenia/osteoporosis, and glaucoma.  As appropriate for age/gender, discussed screening for colorectal cancer, prostate cancer, breast cancer, and cervical cancer. Checklist reviewing preventive services available has been given to the patient.    Reviewed patients plan of care and provided an AVS. The Basic Care Plan (routine screening as documented in Health Maintenance) for Mane meets the Care Plan requirement. This Care Plan has been established and reviewed with the  Patient.      The following health maintenance items are reviewed in Epic and correct as of today:  Health Maintenance   Topic Date Due    RSV VACCINE 60+ (1 - 1-dose 60+ series) Never done    ADVANCE CARE PLANNING  03/25/2018    MEDICARE ANNUAL WELLNESS VISIT  11/20/2018    DTAP/TDAP/TD IMMUNIZATION (2 - Td or Tdap) 08/03/2019    URINE DRUG SCREEN  01/04/2023    FALL RISK ASSESSMENT  01/04/2023    CONTROLLED SUBSTANCE AGREEMENT FOR CHRONIC PAIN MANAGEMENT  01/04/2023    COVID-19 Vaccine (6 - 2023-24 season) 09/01/2023    TIFFANIE ASSESSMENT  05/10/2024    PHQ-9  05/10/2024    LIPID  01/26/2026    COLORECTAL CANCER SCREENING  04/13/2031    HEPATITIS C SCREENING  Completed    PHQ-2 (once per calendar year)  Completed    INFLUENZA VACCINE  Completed    Pneumococcal Vaccine: 65+ Years  Completed "    ZOSTER IMMUNIZATION  Completed    HEPATITIS A IMMUNIZATION  Completed    AORTIC ANEURYSM SCREENING (SYSTEM ASSIGNED)  Completed    IPV IMMUNIZATION  Aged Out    HPV IMMUNIZATION  Aged Out    MENINGITIS IMMUNIZATION  Aged Out       Filemon Magana  [facility]  For any issues my office # is [phone]

## 2023-10-25 NOTE — LETTER
Opioid / Opioid Plus Controlled Substance Agreement    This is an agreement between you and your provider about the safe and appropriate use of controlled substance/opioids prescribed by your care team. Controlled substances are medicines that can cause physical and mental dependence (abuse).    There are strict laws about having and using these medicines. We here at [facility] are committing to working with you in your efforts to get better. To support you in this work, we ll help you schedule regular office appointments for medicine refills. If we must cancel or change your appointment for any reason, we ll make sure you have enough medicine to last until your next appointment.     As a Provider, I will:  Listen carefully to your concerns and treat you with respect.   Recommend a treatment plan that I believe is in your best interest. This plan may involve therapies other than opioid pain medication.   Talk with you often about the possible benefits, and the risk of harm of any medicine that we prescribe for you.   Provide a plan on how to taper (discontinue or go off) using this medicine if the decision is made to stop its use.    As a Patient, I understand that opioid(s):   Are a controlled substance prescribed by my care team to help me function or work and manage my condition(s).   Are strong medicines and can cause serious side effects such as:  Drowsiness, which can seriously affect my driving ability  A lower breathing rate, enough to cause death  Harm to my thinking ability   Depression   Abuse of and addiction to this medicine  Need to be taken exactly as prescribed. Combining opioids with certain medicines or chemicals (such as illegal drugs, sedatives, sleeping pills, and benzodiazepines) can be dangerous or even fatal. If I stop opioids suddenly, I may have severe withdrawal symptoms.  Do not work for all types of pain nor for all patients. If they re not helpful, I may be asked to stop  them.        The risks, benefits and side effects of these medicine(s) were explained to me. I agree that:  I will take part in other treatments as advised by my care team. This may be psychiatry or counseling, physical therapy, behavioral therapy, group treatment or a referral to a specialist.     I will keep all my appointments. I understand that this is part of the monitoring of opioids. My care team may require an office visit for EVERY opioid/controlled substance refill. If I miss appointments or don t follow instructions, my care team may stop my medicine.    I will take my medicines as prescribed. I will not change the dose or schedule unless my care team tells me to. There will be no refills if I run out early.     I may be asked to come to the clinic and complete a urine drug test or complete a pill count at any time. If I don t give a urine sample or participate in a pill count, the care team may stop my medicine.    I will only receive prescriptions from this clinic for chronic pain. If I am treated by another provider for acute pain issues, I will tell them that I am taking opioid pain medication for chronic pain and that I have a treatment agreement with this provider. I will inform my [facility] care team within one business day if I am given a prescription for any pain medication by another healthcare provider. My [facility] care team can contact other providers and pharmacists about my use of any medicines.    It is up to me to make sure that I don t run out of my medicines on weekends or holidays. If my care team is willing to refill my opioid prescription without a visit, I must request refills only during office hours. Refills may take up to 3 business days to process. I will use one pharmacy to fill all my opioid and other controlled substance prescriptions. I will notify the clinic about any changes to my insurance or medication availability.    I am responsible for my  prescriptions. If the medicine/prescription is lost, stolen or destroyed, it will not be replaced. I also agree not to share controlled substance medicines with anyone.    I am aware I should not use any illegal or recreational drugs. I agree not to drink alcohol unless my care team says I can.       If I enroll in the [place] [facility] Cann[facility] program, I will tell my care team prior to my next refill.     I will tell my care team right away if I become pregnant, have a new medical problem treated outside of my regular clinic, or have a change in my medications.    I understand that this medicine can affect my thinking, judgment and reaction time. Alcohol and drugs affect the brain and body, which can affect the safety of my driving. Being under the influence of alcohol or drugs can affect my decision-making, behaviors, personal safety, and the safety of others. Driving while impaired (DWI) can occur if a person is driving, operating, or in physical control of a car, motorcycle, boat, snowmobile, ATV, motorbike, off-road vehicle, or any other motor vehicle ([place] Statute 169A.20). I understand the risk if I choose to drive or operate any vehicle or machinery.    I understand that if I do not follow any of the conditions above, my prescriptions or treatment may be stopped or changed.          Opioids  What You Need to Know    What are opioids?   Opioids are pain medicines that must be prescribed by a doctor. They are also known as narcotics.     Examples are:   morphine (MS Contin, Jennifer)  oxycodone (Oxycontin)  oxycodone and acetaminophen (Percocet)  hydrocodone and acetaminophen (Vicodin, Norco)   fentanyl patch (Duragesic)   hydromorphone (Dilaudid)   methadone  codeine (Tylenol #3)     What do opioids do well?   Opioids are best for severe short-term pain such as after a surgery or injury. They may work well for cancer pain. They may help some people with long-lasting (chronic) pain.     What do opioids NOT do  well?   Opioids never get rid of pain entirely, and they don t work well for most patients with chronic pain. Opioids don t reduce swelling, one of the causes of pain.                                    Other ways to manage chronic pain and improve function include:     Treat the health problem that may be causing pain  Anti-inflammation medicines, which reduce swelling and tenderness, such as ibuprofen (Advil, Motrin) or naproxen (Aleve)  Acetaminophen (Tylenol)  Antidepressants and anti-seizure medicines, especially for nerve pain  Topical treatments such as patches or creams  Injections or nerve blocks  Chiropractic or osteopathic treatment  Acupuncture, massage, deep breathing, meditation, visual imagery, aromatherapy  Use heat or ice at the pain site  Physical therapy   Exercise  Stop smoking  Take part in therapy       Risks and side effects     Talk to your doctor before you start or decide to keep taking opioids. Possible side effects include:    Lowering your breathing rate enough to cause death  Overdose, including death, especially if taking higher than prescribed doses  Worse depression symptoms; less pleasure in things you usually enjoy  Feeling tired or sluggish  Slower thoughts or cloudy thinking  Being more sensitive to pain over time; pain is harder to control  Trouble sleeping or restless sleep  Changes in hormone levels (for example, less testosterone)  Changes in sex drive or ability to have sex  Constipation  Unsafe driving  Itching and sweating  Dizziness  Nausea, throwing up and dry mouth    What else should I know about opioids?    Opioids may lead to dependence, tolerance, or addiction.    Dependence means that if you stop or reduce the medicine too quickly, you will have withdrawal symptoms. These include loose poop (diarrhea), jitters, flu-like symptoms, nervousness and tremors. Dependence is not the same as addiction.                     Tolerance means needing higher doses over time to  get the same effect. This may increase the chance of serious side effects.    Addiction is when people improperly use a substance that harms their body, their mind or their relations with others. Use of opiates can cause a relapse of addiction if you have a history of drug or alcohol abuse.    People who have used opioids for a long time may have a lower quality of life, worse depression, higher levels of pain and more visits to doctors.    You can overdose on opioids. Take these steps to lower your risk of overdose:    Recognize the signs:  Signs of overdose include decrease or loss of consciousness (blackout), slowed breathing, trouble waking up and blue lips. If someone is worried about overdose, they should call 911.    Talk to your doctor about Narcan (naloxone).   If you are at risk for overdose, you may be given a prescription for Narcan. This medicine very quickly reverses the effects of opioids.   If you overdose, a friend or family member can give you Narcan while waiting for the ambulance. They need to know the signs of overdose and how to give Narcan.     Don't use alcohol or street drugs.   Taking them with opioids can cause death.    Do not take any of these medicines unless your doctor says it s OK. Taking these with opioids can cause death:  Benzodiazepines, such as lorazepam (Ativan), alprazolam (Xanax) or diazepam (Valium)  Muscle relaxers, such as cyclobenzaprine (Flexeril)  Sleeping pills like zolpidem (Ambien)   Other opioids      How to keep you and other people safe while taking opioids:    Never share your opioids with others.  Opioid medicines are regulated by the Drug Enforcement Agency ([employer]AC[employer]). Selling or sharing medications is a criminal act.    2. Be sure to store opioids in a secure place, locked up if possible. Young children can easily swallow them and overdose.    3. When you are traveling with your medicines, keep them in the original bottles. If you use a pill box, be sure you also  carry a copy of your medicine list from your clinic or pharmacy.    4. Safe disposal of opioids    Most pharmacies have places to get rid of medicine, called disposal kiosks. Medicine disposal options are also available in every [facility]. Search your county and  medication disposal  to find more options. You can find more details at:  [email or web address]     I agree that my provider, clinic care team, and pharmacy may work with any city, state or federal law enforcement agency that investigates the misuse, sale, or other diversion of my controlled medicine. I will allow my provider to discuss my care with, or share a copy of, this agreement with any other treating provider, pharmacy or emergency room where I receive care.    I have read this agreement and have asked questions about anything I did not understand.    _______________________________________________________  Patient Signature - Mane Chawla _____________________                   Date     _______________________________________________________  Provider Signature - Filemon Magana MD   _____________________                   Date     _______________________________________________________  Witness Signature (required if provider not present while patient signing)   _____________________                   Date

## 2023-11-22 ENCOUNTER — TRANSFERRED RECORDS (OUTPATIENT)
Dept: FAMILY MEDICINE | Facility: CLINIC | Age: 68
End: 2023-11-22

## 2023-11-28 ENCOUNTER — OFFICE VISIT (OUTPATIENT)
Dept: FAMILY MEDICINE | Facility: CLINIC | Age: 68
End: 2023-11-28

## 2023-11-28 VITALS
BODY MASS INDEX: 27.25 KG/M2 | HEART RATE: 70 BPM | OXYGEN SATURATION: 99 % | SYSTOLIC BLOOD PRESSURE: 141 MMHG | WEIGHT: 174 LBS | DIASTOLIC BLOOD PRESSURE: 82 MMHG

## 2023-11-28 DIAGNOSIS — I10 BENIGN ESSENTIAL HYPERTENSION: Primary | ICD-10-CM

## 2023-11-28 PROCEDURE — 99213 OFFICE O/P EST LOW 20 MIN: CPT | Performed by: FAMILY MEDICINE

## 2023-11-28 RX ORDER — LISINOPRIL 20 MG/1
20 TABLET ORAL DAILY
Qty: 90 TABLET | Refills: 3 | Status: SHIPPED | OUTPATIENT
Start: 2023-11-28

## 2023-11-28 NOTE — PROGRESS NOTES
SUBJECTIVE:    Mane Chawla, is a 68 year old male presenting for the below:     1. Hypertension : lisinopril 10 mg daily, hydrochlorothiazide 50 mg daily, metoprolol succinate ER 50 mg daily.     During infusion this morning BP recorded at 170s/80s. Reports home measurements are in the 160s to 180s over 70s to 80s. Denies chest pain or shortness of breath.       OBJECTIVE:  Vitals:    11/28/23 1353 11/28/23 1357   BP: (!) 151/80 (!) 141/82   Pulse: 70    SpO2: 99%    Weight: 78.9 kg (174 lb)     Body mass index is 27.25 kg/m .    General: no acute distress, cooperative with exam.    BP Readings from Last 6 Encounters:   11/28/23 (!) 141/82   10/25/23 127/63   07/10/23 122/79   05/10/23 130/86   12/01/22 (!) 142/70   10/06/22 128/62     ASSESSMENT / PLAN:        Benign essential hypertension  Not at target of <140/90  Increased ACEi   Continue BB, thiazide diuretic.     Discussed and issued on AVS:  Start taking 20 mg lisinopril (2 of the 10 mg tabs)  I have sent 20 mg tabs of lisinopril into your pharmacy.   Follow up in 2 weeks for blood pressure and kidney function lab testing.     -     lisinopril (ZESTRIL) 20 MG tablet; Take 1 tablet (20 mg) by mouth daily

## 2023-11-28 NOTE — PATIENT INSTRUCTIONS
Start taking 20 mg lisinopril (2 of the 10 mg tabs)  I have sent 20 mg tabs of lisinopril into your pharmacy.   Follow up in 2 weeks for blood pressure and kidney function lab testing.

## 2023-12-21 ENCOUNTER — OFFICE VISIT (OUTPATIENT)
Dept: FAMILY MEDICINE | Facility: CLINIC | Age: 68
End: 2023-12-21

## 2023-12-21 VITALS
BODY MASS INDEX: 27.5 KG/M2 | DIASTOLIC BLOOD PRESSURE: 78 MMHG | WEIGHT: 175.6 LBS | SYSTOLIC BLOOD PRESSURE: 134 MMHG | HEART RATE: 75 BPM | OXYGEN SATURATION: 99 %

## 2023-12-21 DIAGNOSIS — Z00.00 ENCOUNTER FOR MEDICARE ANNUAL WELLNESS EXAM: Primary | ICD-10-CM

## 2023-12-21 DIAGNOSIS — I10 BENIGN ESSENTIAL HYPERTENSION: ICD-10-CM

## 2023-12-21 PROCEDURE — G0439 PPPS, SUBSEQ VISIT: HCPCS | Performed by: FAMILY MEDICINE

## 2023-12-21 RX ORDER — TADALAFIL 5 MG/1
TABLET ORAL
COMMUNITY
Start: 2023-12-15

## 2023-12-21 RX ORDER — ESOMEPRAZOLE MAGNESIUM 40 MG/1
40 CAPSULE, DELAYED RELEASE ORAL 2 TIMES DAILY
COMMUNITY
Start: 2023-11-24

## 2023-12-21 NOTE — PATIENT INSTRUCTIONS
Patient Education   Personalized Prevention Plan  You are due for the preventive services outlined below.  Your care team is available to assist you in scheduling these services.  If you have already completed any of these items, please share that information with your care team to update in your medical record.  Health Maintenance Due   Topic Date Due     RSV VACCINE (Pregnancy & 60+) (1 - 1-dose 60+ series) Never done     Discuss Advance Care Planning  03/25/2018     Diptheria Tetanus Pertussis (DTAP/TDAP/TD) Vaccine (2 - Td or Tdap) 08/03/2019     URINE DRUG SCREEN  01/04/2023

## 2023-12-21 NOTE — PROGRESS NOTES
"SUBJECTIVE:   Patient today who presents for Preventive Visit.  Lisinopril went up to 20 at last visit  Back and shoulder and neck still causing pain and currently worse than nrl.  Had surgery in July:  Minimally invasive / Tubular retractor-assisted laminectomy with decompression central and bilateral subarticular, L2-3 and L3-4 (right paracentral approach)   At times the pain is like being stabbed in the back.     Patient has been advised of split billing requirements and indicates understanding: Yes  Are you in the first 12 months of your Medicare Part B coverage?  No    Physical Health:  In general, how would you rate your overall physical health? fair  Outside of work, how many days during the week do you exercise? 2-3 days/week  Outside of work, approximately how many minutes a day do you exercise?greater than 60 minutes  If you drink alcohol do you typically have >3 drinks per day or >7 drinks per week? Yes - AUDIT SCORE:       Do you usually eat at least 4 servings of fruit and vegetables a day, include whole grains & fiber and avoid regularly eating high fat or \"junk\" foods? NO  Do you have any problems taking medications regularly?  No  Do you have any side effects from medications? none  Needs assistance for the following daily activities: no assistance needed  Which of the following safety concerns are present in your home?  none identified   Hearing impairment: No  HEARING FREQUENCY:   Right Ear:     500 Hz :  pass   1000 Hz:  pass   2000 Hz:  pass   4000 Hz:  fail  Left Ear:     500 Hz :  pass   1000 Hz:  pass   2000 Hz:  pass   4000 Hz   fail  Petty Daniel CMA. 12/21/2023   In the past 6 months, have you been bothered by leaking of urine? no  Mental Health:  In general, how would you rate your overall mental or emotional health? excellent  Do you feel safe in your environment? Yes  Have you ever done Advance Care Planning? (For example, a Health Directive, POLST, or a discussion with a medical " provider or your loved ones about your wishes): Yes, patient states has an Advance Care Planning document and will bring a copy to the clinic.  Do you have sleep apnea, excessive snoring or daytime drowsiness? : no  Healthy Habits:  Have you had an eye exam in the past two years? no   Do you see a dentist twice per year? yes   What is your current smoking status? quit  Do you use smokeless tobacco? no  Abuse: Current or Past(Physical, Sexual or Emotional)- No     PHQ-2 Score:    Fall risk:  Fallen 2 or more times in the past year?: No  Any fall with injury in the past year?: No    Cognitive Screenin) Repeat 3 items (Leader, Season, Table)    2) Clock draw: NORMAL  3) 3 item recall: Recalls 3 objects  Results: 3 items recalled: COGNITIVE IMPAIRMENT LESS LIKELY    Mini-CogTM Copyright S Dc. Licensed by the author for use in [facility]; reprinted with permission ([email or web address]@[email or web address]). All rights reserved.      Additional concerns to address?  No                PROBLEMS TO ADD ON...  HTN recheck and also do AWW    Reviewed and updated as needed this visit by clinical staff    Allergies  Meds              Reviewed and updated as needed this visit by Provider                 Social History     Tobacco Use    Smoking status: Former     Packs/day: 1.00     Years: 30.00     Additional pack years: 0.00     Total pack years: 30.00     Types: Cigarettes     Quit date: 3/21/1996     Years since quittin.7    Smokeless tobacco: Never   Substance Use Topics    Alcohol use: Yes     Alcohol/week: 8.3 standard drinks of alcohol     Types: 10 Standard drinks or equivalent per week              No data to display              Do you have a current opioid prescription? Yes   How severe is your pain on a scale from 1-10? 8/10         Do you use any other controlled substances or medications that are not prescribed by a provider? None                      Current providers sharing in care for this patient include:  "  Patient Care Team:  Filemon Magana MD as PCP - General (Family Practice)  Filemon Magana MD as Assigned PCP  Filemon Magana MD as Assigned Pain Medication Provider    The following health maintenance items are reviewed in Epic and correct as of today:  Health Maintenance   Topic Date Due    RSV VACCINE (Pregnancy & 60+) (1 - 1-dose 60+ series) Never done    DTAP/TDAP/TD IMMUNIZATION (2 - Td or Tdap) 08/03/2019    URINE DRUG SCREEN  01/04/2023    TIFFANIE ASSESSMENT  05/10/2024    PHQ-9  05/10/2024    CONTROLLED SUBSTANCE AGREEMENT FOR CHRONIC PAIN MANAGEMENT  10/30/2024    MEDICARE ANNUAL WELLNESS VISIT  12/21/2024    FALL RISK ASSESSMENT  12/21/2024    LIPID  01/26/2026    ADVANCE CARE PLANNING  12/21/2028    COLORECTAL CANCER SCREENING  04/13/2031    HEPATITIS C SCREENING  Completed    PHQ-2 (once per calendar year)  Completed    INFLUENZA VACCINE  Completed    Pneumococcal Vaccine: 65+ Years  Completed    ZOSTER IMMUNIZATION  Completed    HEPATITIS A IMMUNIZATION  Completed    AORTIC ANEURYSM SCREENING (SYSTEM ASSIGNED)  Completed    COVID-19 Vaccine  Completed    IPV IMMUNIZATION  Aged Out    HPV IMMUNIZATION  Aged Out    MENINGITIS IMMUNIZATION  Aged Out    RSV MONOCLONAL ANTIBODY  Aged Out     Lab work is in process      ROS:  Constitutional, HEENT, cardiovascular, pulmonary, GI, , musculoskeletal, neuro, skin, endocrine and psych systems are negative, except as otherwise noted.    OBJECTIVE:   /78   Pulse 75   Wt 79.7 kg (175 lb 9.6 oz)   SpO2 99%   BMI 27.50 kg/m     Estimated body mass index is 27.5 kg/m  as calculated from the following:    Height as of 5/10/23: 1.702 m (5' 7\").    Weight as of this encounter: 79.7 kg (175 lb 9.6 oz).  EXAM:   GENERAL: healthy, alert and no distress  EYES: Eyes grossly normal to inspection, PERRL and conjunctivae and sclerae normal  HENT: ear canals and TM's normal, nose and mouth without ulcers or lesions  NECK: no adenopathy, no asymmetry, " "masses, or scars and thyroid normal to palpation  RESP: lungs clear to auscultation - no rales, rhonchi or wheezes  CV: regular rate and rhythm, normal S1 S2, no S3 or S4, no murmur, click or rub, no peripheral edema and peripheral pulses strong  ABDOMEN: soft, nontender, no hepatosplenomegaly, no masses and bowel sounds normal  MS: no gross musculoskeletal defects noted, no edema  SKIN: no suspicious lesions or rashes  NEURO: Normal strength and tone, mentation intact and speech normal  PSYCH: mentation appears normal, affect normal/bright    Diagnostic Test Results:  Labs reviewed in Epic    ASSESSMENT / PLAN:   Alexandro was seen today for hypertension and physical.    Diagnoses and all orders for this visit:    Encounter for Medicare annual wellness exam    Benign essential hypertension  Reviewed his current HTN management. Discussed our goal for him is a systolic pressure at or below 128 and diastolic pressure at or below 83.  We today managed his prescriptions with refills ensured to ensure availabilty of current medications.  Discussed the importance for aggressive management of HTN to prevent vascular complications later.  Recommended lower fat, lower carbohydrate, and lower sodium (<2000 mg)diet. Required intervals for follow up on HTN, lab studies reviewed.    Strongly recommened he follow his blood pressures outside the clinic to ensure that BPs are remaining within guidelines,.  Instructed to contact me if the readings are not within guidelines on a regular basis so we can adjust treatment as needed.    Upt to date on labs,  Rec shots as due for at pharm    Patient has been advised of split billing requirements and indicates understanding: Yes    COUNSELING:  Reviewed preventive health counseling, as reflected in patient instructions       Regular exercise       Healthy diet/nutrition    Estimated body mass index is 27.5 kg/m  as calculated from the following:    Height as of 5/10/23: 1.702 m (5' 7\").    " Weight as of this encounter: 79.7 kg (175 lb 9.6 oz).        He reports that he quit smoking about 27 years ago. His smoking use included cigarettes. He has a 30 pack-year smoking history. He has never used smokeless tobacco.    I have reviewed Opioid Use Disorder and Substance Use Disorder risk factors and made any needed referrals.   Appropriate preventive services were discussed with this patient, including applicable screening as appropriate for cardiovascular disease, diabetes, osteopenia/osteoporosis, and glaucoma.  As appropriate for age/gender, discussed screening for colorectal cancer, prostate cancer, breast cancer, and cervical cancer. Checklist reviewing preventive services available has been given to the patient.    Reviewed patients plan of care and provided an AVS. The Basic Care Plan (routine screening as documented in Health Maintenance) for Usman meets the Care Plan requirement. This Care Plan has been established and reviewed with the Patient.    Counseling Resources:  ATP IV Guidelines  Pooled Cohorts Equation Calculator  Breast Cancer Risk Calculator  BRCA-Related Cancer Risk Assessment: FHS-7 Tool  FRAX Risk Assessment  ICSI Preventive Guidelines  Dietary Guidelines for Americans, 2010  USDA's MyPlate  ASA Prophylaxis  Lung CA Screening    Filemon Magana MD  [facility]

## 2023-12-30 DIAGNOSIS — I10 BENIGN ESSENTIAL HYPERTENSION: ICD-10-CM

## 2024-01-01 RX ORDER — HYDROCHLOROTHIAZIDE 50 MG/1
50 TABLET ORAL DAILY
Qty: 154 TABLET | Refills: 0 | Status: ON HOLD | OUTPATIENT
Start: 2024-01-01 | End: 2024-05-06

## 2024-01-04 ENCOUNTER — OFFICE VISIT (OUTPATIENT)
Dept: FAMILY MEDICINE | Facility: CLINIC | Age: 69
End: 2024-01-04

## 2024-01-04 VITALS
BODY MASS INDEX: 26.98 KG/M2 | HEART RATE: 61 BPM | DIASTOLIC BLOOD PRESSURE: 80 MMHG | OXYGEN SATURATION: 100 % | HEIGHT: 68 IN | SYSTOLIC BLOOD PRESSURE: 148 MMHG | WEIGHT: 178 LBS

## 2024-01-04 DIAGNOSIS — I47.10 SVT (SUPRAVENTRICULAR TACHYCARDIA) (H): ICD-10-CM

## 2024-01-04 DIAGNOSIS — M70.22 OLECRANON BURSITIS OF LEFT ELBOW: Primary | ICD-10-CM

## 2024-01-04 DIAGNOSIS — Z79.899 IMMUNODEFICIENCY DUE TO DRUG THERAPY (H): ICD-10-CM

## 2024-01-04 DIAGNOSIS — D84.821 IMMUNODEFICIENCY DUE TO DRUG THERAPY (H): ICD-10-CM

## 2024-01-04 DIAGNOSIS — F11.21 NARCOTIC DEPENDENCE, IN REMISSION (H): ICD-10-CM

## 2024-01-04 DIAGNOSIS — I70.0 ATHEROSCLEROSIS OF AORTA (H): ICD-10-CM

## 2024-01-04 DIAGNOSIS — Z79.620 INFLIXIMAB (REMICADE) LONG-TERM USE: ICD-10-CM

## 2024-01-04 DIAGNOSIS — L40.59 POLYARTICULAR PSORIATIC ARTHRITIS (H): ICD-10-CM

## 2024-01-04 PROCEDURE — A6449 LT COMPRES BAND >=3" <5"/YD: HCPCS | Mod: GA | Performed by: FAMILY MEDICINE

## 2024-01-04 PROCEDURE — 99214 OFFICE O/P EST MOD 30 MIN: CPT | Performed by: FAMILY MEDICINE

## 2024-01-04 RX ORDER — ZINC ACETATE 50 MG/1
50 CAPSULE ORAL DAILY
COMMUNITY

## 2024-01-04 RX ORDER — HYDROMORPHONE HYDROCHLORIDE 4 MG/1
4 TABLET ORAL EVERY 6 HOURS PRN
COMMUNITY
End: 2024-01-04

## 2024-01-04 RX ORDER — MULTIVIT WITH MINERALS/LUTEIN
1 TABLET ORAL DAILY
COMMUNITY

## 2024-01-04 NOTE — PROGRESS NOTES
"Problem(s) Oriented visit      ROS:  General and Resp. completed and negative except as noted above     HISTORY:  Changes made to appropriate history sections   See chart for additional current history and problem list    EXAM:  BP: 148/80   Pulse: 61    Temp: Data Unavailable    Wt Readings from Last 2 Encounters:   01/04/24 80.7 kg (178 lb)   12/21/23 79.7 kg (175 lb 9.6 oz)       BMI= Body mass index is 27.47 kg/m .    EXAM:  APPEARANCE: = Relaxed and in no distress  Ext (edema) = No pretibial edema noted or elsewhere  Musculsktl: bursa swelling on left elbow      Assessment/Plan:  Alexandro was seen today for mass.    Diagnoses and all orders for this visit:    Olecranon bursitis of left elbow  Ace wrap and ice  -     LIGHT COMPRESSION BANDAGE, ELASTIC, KNITTED/WOVEN, WIDTH GREATER THAN OR EQUAL    Polyarticular psoriatic arthritis (H)  Being actively managed by Rheum with Remicaide with resulting Immunodeficiency so vigiliant on watch for infections    Atherosclerosis of aorta (H24)  We will continue to aggressively manage secondary risk factors, including aggressive BP control (under 130/ideally), aggressive LDL lowering with statin (goal under 100 for sure/under 70 ideally), no smoking, diabetes prevention/management, no smoking, and use of either ASA or similar anti platelet agent if tolerated.  On statin     SVT (supraventricular tachycardia)  No recent palpitations or chest pain continue beta blocker    Previous consistent narcotic treatment with dependence, now off regular use, using nsaids and other modalities, but should be aware of dependence in remission     COUNSELING:    Estimated body mass index is 27.47 kg/m  as calculated from the following:    Height as of this encounter: 1.715 m (5' 7.5\").    Weight as of this encounter: 80.7 kg (178 lb).       The following health maintenance items are reviewed in Epic and correct as of today:  Health Maintenance   Topic Date Due    RSV VACCINE (Pregnancy & 60+) " (1 - 1-dose 60+ series) Never done    DTAP/TDAP/TD IMMUNIZATION (2 - Td or Tdap) 08/03/2019    URINE DRUG SCREEN  01/04/2023    TIFFANIE ASSESSMENT  05/10/2024    PHQ-9  05/10/2024    CONTROLLED SUBSTANCE AGREEMENT FOR CHRONIC PAIN MANAGEMENT  10/30/2024    MEDICARE ANNUAL WELLNESS VISIT  12/21/2024    FALL RISK ASSESSMENT  12/21/2024    LIPID  01/26/2026    ADVANCE CARE PLANNING  12/21/2028    COLORECTAL CANCER SCREENING  04/13/2031    HEPATITIS C SCREENING  Completed    PHQ-2 (once per calendar year)  Completed    INFLUENZA VACCINE  Completed    Pneumococcal Vaccine: 65+ Years  Completed    ZOSTER IMMUNIZATION  Completed    HEPATITIS A IMMUNIZATION  Completed    AORTIC ANEURYSM SCREENING (SYSTEM ASSIGNED)  Completed    COVID-19 Vaccine  Completed    IPV IMMUNIZATION  Aged Out    HPV IMMUNIZATION  Aged Out    MENINGITIS IMMUNIZATION  Aged Out    RSV MONOCLONAL ANTIBODY  Aged Out       Filemon Magana  [facility]  For any issues my office # is [phone]

## 2024-01-08 ENCOUNTER — TRANSFERRED RECORDS (OUTPATIENT)
Dept: FAMILY MEDICINE | Facility: CLINIC | Age: 69
End: 2024-01-08

## 2024-01-25 ENCOUNTER — PATIENT OUTREACH (OUTPATIENT)
Dept: GASTROENTEROLOGY | Facility: CLINIC | Age: 69
End: 2024-01-25
Payer: COMMERCIAL

## 2024-01-25 DIAGNOSIS — Z12.11 SPECIAL SCREENING FOR MALIGNANT NEOPLASMS, COLON: Primary | ICD-10-CM

## 2024-01-25 NOTE — PROGRESS NOTES
"Patients last colonoscopy was on 4/13/21 and was recommended to repeat in 3 years.  CRC Screening Colonoscopy Referral Review    Patient meets the inclusion criteria for screening colonoscopy standing order.    Ordering/Referring Provider:  Filemon Magana MD    BMI: Estimated body mass index is 27.47 kg/m  as calculated from the following:    Height as of 1/4/24: 1.715 m (5' 7.5\").    Weight as of 1/4/24: 80.7 kg (178 lb).     Sedation:  Does patient have any of the following conditions affecting sedation?  No medical conditions affecting sedation.    Previous Scopes:  Any previous recommendations or follow up needs based on previous scope?  na / No recommendations.    Medical Concerns to Postpone Order:  Does patient have any of the following medical concerns that should postpone/delay colonoscopy referral?  No medical conditions affecting colonoscopy referral.    Final Referral Details:  Based on patient's medical history patient is appropriate for referral order with moderate sedation. If patient's BMI > 50 do not schedule in ASC.  "

## 2024-01-30 DIAGNOSIS — M62.838 MUSCLE SPASM: ICD-10-CM

## 2024-01-30 RX ORDER — DIAZEPAM 5 MG
TABLET ORAL
Qty: 30 TABLET | Refills: 1 | Status: SHIPPED | OUTPATIENT
Start: 2024-01-30 | End: 2024-08-05

## 2024-02-01 ENCOUNTER — PATIENT OUTREACH (OUTPATIENT)
Dept: GASTROENTEROLOGY | Facility: CLINIC | Age: 69
End: 2024-02-01
Payer: COMMERCIAL

## 2024-02-01 NOTE — PROGRESS NOTES
Order for colonoscopy and message sent to patient. After review: Patient part of [facility] and does not meet inclusion criteria for management by CRC team.

## 2024-02-07 ENCOUNTER — TRANSFERRED RECORDS (OUTPATIENT)
Dept: FAMILY MEDICINE | Facility: CLINIC | Age: 69
End: 2024-02-07

## 2024-02-09 DIAGNOSIS — I10 BENIGN ESSENTIAL HYPERTENSION: ICD-10-CM

## 2024-02-09 NOTE — TELEPHONE ENCOUNTER
Med: Metoprolol       LOV (related): 12/21/23    Last Lab: 7/17/23      Due for F/U around: 6 months     Next Appt: None         BP Readings from Last 3 Encounters:   01/04/24 (!) 148/80   12/21/23 134/78   11/28/23 (!) 141/82       Last Comprehensive Metabolic Panel:  Lab Results   Component Value Date     07/10/2023    POTASSIUM 4.3 07/10/2023    CHLORIDE 102 07/10/2023     (H) 07/10/2023    BUN 15 07/10/2023    BUN 13 07/10/2023    CR 1.12 07/10/2023    LEIGHA 9.3 07/10/2023

## 2024-02-11 RX ORDER — METOPROLOL SUCCINATE 50 MG/1
50 TABLET, EXTENDED RELEASE ORAL DAILY
Qty: 90 TABLET | Refills: 3 | Status: SHIPPED | OUTPATIENT
Start: 2024-02-11

## 2024-02-16 ENCOUNTER — TRANSFERRED RECORDS (OUTPATIENT)
Dept: FAMILY MEDICINE | Facility: CLINIC | Age: 69
End: 2024-02-16

## 2024-05-01 ENCOUNTER — OFFICE VISIT (OUTPATIENT)
Dept: FAMILY MEDICINE | Facility: CLINIC | Age: 69
End: 2024-05-01

## 2024-05-01 VITALS
SYSTOLIC BLOOD PRESSURE: 113 MMHG | OXYGEN SATURATION: 97 % | HEART RATE: 76 BPM | BODY MASS INDEX: 28.08 KG/M2 | WEIGHT: 182 LBS | DIASTOLIC BLOOD PRESSURE: 47 MMHG

## 2024-05-01 DIAGNOSIS — R05.1 ACUTE COUGH: Primary | ICD-10-CM

## 2024-05-01 DIAGNOSIS — J10.1 INFLUENZA A: ICD-10-CM

## 2024-05-01 LAB
INFLUENZA A (RMG): POSITIVE
INFLUENZA B (RMG): NEGATIVE
SARS ANTIGEN (RMG): NEGATIVE

## 2024-05-01 PROCEDURE — 87428 SARSCOV & INF VIR A&B AG IA: CPT | Performed by: FAMILY MEDICINE

## 2024-05-01 PROCEDURE — 99214 OFFICE O/P EST MOD 30 MIN: CPT | Performed by: FAMILY MEDICINE

## 2024-05-01 NOTE — PROGRESS NOTES
Mane Chawla  is a 68 year old  year old male presenting with a complaint of  Cough.   Started with rhinorhea for 4-5 days, then progressed to significant cough, no fever but has chills,  Produces greenish phlegm, getting worse every day....  The  cough non-productive, productive of clear sputum, productive of green/yellow sputum for 10 days.   Quality: weak, daytime, and night time  Severity: mild  Associated symptoms: nasal congestion.    Diarrhea as well for 4-5 days, negative covid test on Monday....  ROS:  General and Resp. completed and negative except as noted above    Histories: reviewed    OBJECTIVE:                                                    /47   Pulse 76   Wt 82.6 kg (182 lb)   SpO2 97%   BMI 28.08 kg/m    Body mass index is 28.08 kg/m .   APPEARANCE: = Relaxed and in no distress  Ears/Nose = External structures and Nares have usual shape and form  Oropharynx = No leukoplakia, No injection to the tissues, Normal Uvula  Resp effort =  Mild Distress  Breath Sounds =  scattered rhonchi     ASSESSMENT/PLAN:                                                    Quality gaps reviewed    Alexandro was seen today for uri.    Diagnoses and all orders for this visit:    Acute cough  -     Influenza + SARS Antigen ([facility])    Influenza A    To late for tamilflu  Symptomatic treatment    Work on weight loss  Regular exercise    Health maintenance items are reviewed in Epic and correct as of today:    Filemon Magana MD  [place] [facility]  Family Practice  [facility]  [phone]    For any issues my office # is [phone]

## 2024-05-03 ENCOUNTER — HOSPITAL ENCOUNTER (INPATIENT)
Facility: CLINIC | Age: 69
LOS: 3 days | Discharge: HOME | DRG: 194 | End: 2024-05-06
Attending: EMERGENCY MEDICINE | Admitting: HOSPITALIST
Payer: COMMERCIAL

## 2024-05-03 ENCOUNTER — APPOINTMENT (OUTPATIENT)
Dept: GENERAL RADIOLOGY | Facility: CLINIC | Age: 69
DRG: 194 | End: 2024-05-03
Attending: EMERGENCY MEDICINE
Payer: COMMERCIAL

## 2024-05-03 DIAGNOSIS — I10 BENIGN ESSENTIAL HYPERTENSION: ICD-10-CM

## 2024-05-03 DIAGNOSIS — R53.1 WEAKNESS: ICD-10-CM

## 2024-05-03 DIAGNOSIS — R19.7 DIARRHEA, UNSPECIFIED TYPE: ICD-10-CM

## 2024-05-03 DIAGNOSIS — J10.1 INFLUENZA A: Primary | ICD-10-CM

## 2024-05-03 DIAGNOSIS — J18.9 MULTIFOCAL PNEUMONIA: ICD-10-CM

## 2024-05-03 LAB
ALBUMIN SERPL BCG-MCNC: 3.3 G/DL (ref 3.5–5.2)
ALP SERPL-CCNC: 98 U/L (ref 40–150)
ALT SERPL W P-5'-P-CCNC: 17 U/L (ref 0–70)
ANION GAP SERPL CALCULATED.3IONS-SCNC: 15 MMOL/L (ref 7–15)
ANION GAP SERPL CALCULATED.3IONS-SCNC: 16 MMOL/L (ref 7–15)
AST SERPL W P-5'-P-CCNC: 34 U/L (ref 0–45)
BASOPHILS # BLD AUTO: 0 10E3/UL (ref 0–0.2)
BASOPHILS NFR BLD AUTO: 0 %
BILIRUB SERPL-MCNC: 0.5 MG/DL
BUN SERPL-MCNC: 17.5 MG/DL (ref 8–23)
BUN SERPL-MCNC: 24.5 MG/DL (ref 8–23)
CALCIUM SERPL-MCNC: 7.9 MG/DL (ref 8.8–10.2)
CALCIUM SERPL-MCNC: 8.5 MG/DL (ref 8.8–10.2)
CHLORIDE SERPL-SCNC: 102 MMOL/L (ref 98–107)
CHLORIDE SERPL-SCNC: 99 MMOL/L (ref 98–107)
CK SERPL-CCNC: 78 U/L (ref 39–308)
CREAT SERPL-MCNC: 1.09 MG/DL (ref 0.67–1.17)
CREAT SERPL-MCNC: 1.19 MG/DL (ref 0.67–1.17)
CRP SERPL-MCNC: 157.57 MG/L
DEPRECATED HCO3 PLAS-SCNC: 13 MMOL/L (ref 22–29)
DEPRECATED HCO3 PLAS-SCNC: 14 MMOL/L (ref 22–29)
EGFRCR SERPLBLD CKD-EPI 2021: 66 ML/MIN/1.73M2
EGFRCR SERPLBLD CKD-EPI 2021: 73 ML/MIN/1.73M2
EOSINOPHIL # BLD AUTO: 0 10E3/UL (ref 0–0.7)
EOSINOPHIL NFR BLD AUTO: 0 %
ERYTHROCYTE [DISTWIDTH] IN BLOOD BY AUTOMATED COUNT: 13.5 % (ref 10–15)
FLUAV RNA SPEC QL NAA+PROBE: POSITIVE
FLUBV RNA RESP QL NAA+PROBE: NEGATIVE
GLUCOSE SERPL-MCNC: 100 MG/DL (ref 70–99)
GLUCOSE SERPL-MCNC: 88 MG/DL (ref 70–99)
HCT VFR BLD AUTO: 36.2 % (ref 40–53)
HGB BLD-MCNC: 12.6 G/DL (ref 13.3–17.7)
IMM GRANULOCYTES # BLD: 0.1 10E3/UL
IMM GRANULOCYTES NFR BLD: 1 %
LACTATE SERPL-SCNC: 0.8 MMOL/L (ref 0.7–2)
LYMPHOCYTES # BLD AUTO: 1.3 10E3/UL (ref 0.8–5.3)
LYMPHOCYTES NFR BLD AUTO: 13 %
MCH RBC QN AUTO: 31.7 PG (ref 26.5–33)
MCHC RBC AUTO-ENTMCNC: 34.8 G/DL (ref 31.5–36.5)
MCV RBC AUTO: 91 FL (ref 78–100)
MONOCYTES # BLD AUTO: 1 10E3/UL (ref 0–1.3)
MONOCYTES NFR BLD AUTO: 10 %
NEUTROPHILS # BLD AUTO: 7.4 10E3/UL (ref 1.6–8.3)
NEUTROPHILS NFR BLD AUTO: 76 %
NRBC # BLD AUTO: 0 10E3/UL
NRBC BLD AUTO-RTO: 0 /100
PLATELET # BLD AUTO: 193 10E3/UL (ref 150–450)
POTASSIUM SERPL-SCNC: 3.6 MMOL/L (ref 3.4–5.3)
POTASSIUM SERPL-SCNC: 3.7 MMOL/L (ref 3.4–5.3)
PROCALCITONIN SERPL IA-MCNC: 1.62 NG/ML
PROT SERPL-MCNC: 7.1 G/DL (ref 6.4–8.3)
RBC # BLD AUTO: 3.98 10E6/UL (ref 4.4–5.9)
RSV RNA SPEC NAA+PROBE: NEGATIVE
SARS-COV-2 RNA RESP QL NAA+PROBE: NEGATIVE
SODIUM SERPL-SCNC: 128 MMOL/L (ref 135–145)
SODIUM SERPL-SCNC: 131 MMOL/L (ref 135–145)
TROPONIN T SERPL HS-MCNC: 9 NG/L
WBC # BLD AUTO: 9.8 10E3/UL (ref 4–11)

## 2024-05-03 PROCEDURE — 36415 COLL VENOUS BLD VENIPUNCTURE: CPT | Performed by: HOSPITALIST

## 2024-05-03 PROCEDURE — 250N000011 HC RX IP 250 OP 636: Performed by: EMERGENCY MEDICINE

## 2024-05-03 PROCEDURE — 87040 BLOOD CULTURE FOR BACTERIA: CPT | Performed by: EMERGENCY MEDICINE

## 2024-05-03 PROCEDURE — 99223 1ST HOSP IP/OBS HIGH 75: CPT | Mod: AI | Performed by: HOSPITALIST

## 2024-05-03 PROCEDURE — 36415 COLL VENOUS BLD VENIPUNCTURE: CPT | Performed by: EMERGENCY MEDICINE

## 2024-05-03 PROCEDURE — 85025 COMPLETE CBC W/AUTO DIFF WBC: CPT | Performed by: EMERGENCY MEDICINE

## 2024-05-03 PROCEDURE — 84145 PROCALCITONIN (PCT): CPT | Performed by: HOSPITALIST

## 2024-05-03 PROCEDURE — 80053 COMPREHEN METABOLIC PANEL: CPT | Performed by: EMERGENCY MEDICINE

## 2024-05-03 PROCEDURE — 120N000001 HC R&B MED SURG/OB

## 2024-05-03 PROCEDURE — 83605 ASSAY OF LACTIC ACID: CPT | Performed by: EMERGENCY MEDICINE

## 2024-05-03 PROCEDURE — 96375 TX/PRO/DX INJ NEW DRUG ADDON: CPT

## 2024-05-03 PROCEDURE — 71046 X-RAY EXAM CHEST 2 VIEWS: CPT

## 2024-05-03 PROCEDURE — 82550 ASSAY OF CK (CPK): CPT | Performed by: HOSPITALIST

## 2024-05-03 PROCEDURE — 87637 SARSCOV2&INF A&B&RSV AMP PRB: CPT | Performed by: EMERGENCY MEDICINE

## 2024-05-03 PROCEDURE — 258N000003 HC RX IP 258 OP 636: Performed by: EMERGENCY MEDICINE

## 2024-05-03 PROCEDURE — 250N000011 HC RX IP 250 OP 636: Performed by: HOSPITALIST

## 2024-05-03 PROCEDURE — 99285 EMERGENCY DEPT VISIT HI MDM: CPT | Mod: 25

## 2024-05-03 PROCEDURE — 250N000013 HC RX MED GY IP 250 OP 250 PS 637: Performed by: HOSPITALIST

## 2024-05-03 PROCEDURE — 86140 C-REACTIVE PROTEIN: CPT | Performed by: HOSPITALIST

## 2024-05-03 PROCEDURE — 96361 HYDRATE IV INFUSION ADD-ON: CPT

## 2024-05-03 PROCEDURE — 84484 ASSAY OF TROPONIN QUANT: CPT | Performed by: HOSPITALIST

## 2024-05-03 PROCEDURE — 96374 THER/PROPH/DIAG INJ IV PUSH: CPT

## 2024-05-03 RX ORDER — ASCORBIC ACID 500 MG
1000 TABLET ORAL DAILY
Status: DISCONTINUED | OUTPATIENT
Start: 2024-05-04 | End: 2024-05-06 | Stop reason: HOSPADM

## 2024-05-03 RX ORDER — CYCLOBENZAPRINE HCL 5 MG
5 TABLET ORAL DAILY PRN
Status: DISCONTINUED | OUTPATIENT
Start: 2024-05-03 | End: 2024-05-05

## 2024-05-03 RX ORDER — AZITHROMYCIN 500 MG/1
500 INJECTION, POWDER, LYOPHILIZED, FOR SOLUTION INTRAVENOUS EVERY 24 HOURS
Qty: 20 ML | Refills: 0 | Status: DISCONTINUED | OUTPATIENT
Start: 2024-05-04 | End: 2024-05-06 | Stop reason: HOSPADM

## 2024-05-03 RX ORDER — CEFTRIAXONE 2 G/1
2 INJECTION, POWDER, FOR SOLUTION INTRAMUSCULAR; INTRAVENOUS ONCE
Status: COMPLETED | OUTPATIENT
Start: 2024-05-03 | End: 2024-05-03

## 2024-05-03 RX ORDER — ACETAMINOPHEN 325 MG/1
650 TABLET ORAL EVERY 4 HOURS PRN
Status: DISCONTINUED | OUTPATIENT
Start: 2024-05-03 | End: 2024-05-06 | Stop reason: HOSPADM

## 2024-05-03 RX ORDER — LISINOPRIL 20 MG/1
20 TABLET ORAL DAILY
Status: DISCONTINUED | OUTPATIENT
Start: 2024-05-03 | End: 2024-05-06 | Stop reason: HOSPADM

## 2024-05-03 RX ORDER — LIDOCAINE 40 MG/G
CREAM TOPICAL
Status: DISCONTINUED | OUTPATIENT
Start: 2024-05-03 | End: 2024-05-06 | Stop reason: HOSPADM

## 2024-05-03 RX ORDER — AZITHROMYCIN 500 MG/1
500 INJECTION, POWDER, LYOPHILIZED, FOR SOLUTION INTRAVENOUS ONCE
Status: COMPLETED | OUTPATIENT
Start: 2024-05-03 | End: 2024-05-03

## 2024-05-03 RX ORDER — ONDANSETRON 2 MG/ML
4 INJECTION INTRAMUSCULAR; INTRAVENOUS EVERY 6 HOURS PRN
Status: DISCONTINUED | OUTPATIENT
Start: 2024-05-03 | End: 2024-05-06 | Stop reason: HOSPADM

## 2024-05-03 RX ORDER — HYDRALAZINE HYDROCHLORIDE 20 MG/ML
10 INJECTION INTRAMUSCULAR; INTRAVENOUS EVERY 4 HOURS PRN
Status: DISCONTINUED | OUTPATIENT
Start: 2024-05-03 | End: 2024-05-06 | Stop reason: HOSPADM

## 2024-05-03 RX ORDER — METOPROLOL SUCCINATE 50 MG/1
50 TABLET, EXTENDED RELEASE ORAL DAILY
Status: DISCONTINUED | OUTPATIENT
Start: 2024-05-04 | End: 2024-05-06 | Stop reason: HOSPADM

## 2024-05-03 RX ORDER — PANTOPRAZOLE SODIUM 40 MG/1
40 TABLET, DELAYED RELEASE ORAL
Status: DISCONTINUED | OUTPATIENT
Start: 2024-05-03 | End: 2024-05-06 | Stop reason: HOSPADM

## 2024-05-03 RX ORDER — BENZONATATE 200 MG/1
200 CAPSULE ORAL 3 TIMES DAILY PRN
Qty: 30 CAPSULE | Refills: 0 | Status: SHIPPED | OUTPATIENT
Start: 2024-05-03 | End: 2024-05-06

## 2024-05-03 RX ORDER — MULTIVITAMIN,THERAPEUTIC
1 TABLET ORAL DAILY
Status: DISCONTINUED | OUTPATIENT
Start: 2024-05-04 | End: 2024-05-06 | Stop reason: HOSPADM

## 2024-05-03 RX ORDER — CALCIUM CARBONATE 500 MG/1
1000 TABLET, CHEWABLE ORAL 4 TIMES DAILY PRN
Status: DISCONTINUED | OUTPATIENT
Start: 2024-05-03 | End: 2024-05-06 | Stop reason: HOSPADM

## 2024-05-03 RX ORDER — ENOXAPARIN SODIUM 100 MG/ML
40 INJECTION SUBCUTANEOUS EVERY 24 HOURS
Status: DISCONTINUED | OUTPATIENT
Start: 2024-05-04 | End: 2024-05-06 | Stop reason: HOSPADM

## 2024-05-03 RX ORDER — GUAIFENESIN/DEXTROMETHORPHAN 100-10MG/5
10 SYRUP ORAL EVERY 4 HOURS PRN
Status: DISCONTINUED | OUTPATIENT
Start: 2024-05-03 | End: 2024-05-06 | Stop reason: HOSPADM

## 2024-05-03 RX ORDER — HYDROCHLOROTHIAZIDE 50 MG/1
50 TABLET ORAL DAILY
Status: DISCONTINUED | OUTPATIENT
Start: 2024-05-03 | End: 2024-05-05

## 2024-05-03 RX ORDER — ZINC SULFATE 50(220)MG
220 CAPSULE ORAL DAILY
Status: DISCONTINUED | OUTPATIENT
Start: 2024-05-04 | End: 2024-05-06 | Stop reason: HOSPADM

## 2024-05-03 RX ORDER — KETOROLAC TROMETHAMINE 15 MG/ML
15 INJECTION, SOLUTION INTRAMUSCULAR; INTRAVENOUS ONCE
Status: COMPLETED | OUTPATIENT
Start: 2024-05-03 | End: 2024-05-03

## 2024-05-03 RX ORDER — ASPIRIN 81 MG/1
81 TABLET ORAL DAILY
Status: DISCONTINUED | OUTPATIENT
Start: 2024-05-04 | End: 2024-05-06 | Stop reason: HOSPADM

## 2024-05-03 RX ORDER — AMOXICILLIN 250 MG
2 CAPSULE ORAL 2 TIMES DAILY PRN
Status: DISCONTINUED | OUTPATIENT
Start: 2024-05-03 | End: 2024-05-06 | Stop reason: HOSPADM

## 2024-05-03 RX ORDER — LOPERAMIDE HYDROCHLORIDE 2 MG/1
2-4 TABLET ORAL 3 TIMES DAILY PRN
Qty: 20 TABLET | Refills: 0 | Status: SHIPPED | OUTPATIENT
Start: 2024-05-03 | End: 2024-05-06

## 2024-05-03 RX ORDER — ONDANSETRON 2 MG/ML
4 INJECTION INTRAMUSCULAR; INTRAVENOUS ONCE
Status: COMPLETED | OUTPATIENT
Start: 2024-05-03 | End: 2024-05-03

## 2024-05-03 RX ORDER — AMOXICILLIN 250 MG
1 CAPSULE ORAL 2 TIMES DAILY PRN
Status: DISCONTINUED | OUTPATIENT
Start: 2024-05-03 | End: 2024-05-06 | Stop reason: HOSPADM

## 2024-05-03 RX ORDER — CEFTRIAXONE 2 G/1
2 INJECTION, POWDER, FOR SOLUTION INTRAMUSCULAR; INTRAVENOUS EVERY 24 HOURS
Qty: 120 ML | Refills: 0 | Status: DISCONTINUED | OUTPATIENT
Start: 2024-05-04 | End: 2024-05-06 | Stop reason: HOSPADM

## 2024-05-03 RX ORDER — ONDANSETRON 4 MG/1
4 TABLET, ORALLY DISINTEGRATING ORAL EVERY 6 HOURS PRN
Status: DISCONTINUED | OUTPATIENT
Start: 2024-05-03 | End: 2024-05-06 | Stop reason: HOSPADM

## 2024-05-03 RX ORDER — ACETAMINOPHEN 650 MG/1
650 SUPPOSITORY RECTAL EVERY 4 HOURS PRN
Status: DISCONTINUED | OUTPATIENT
Start: 2024-05-03 | End: 2024-05-06 | Stop reason: HOSPADM

## 2024-05-03 RX ORDER — DIAZEPAM 2 MG
2 TABLET ORAL
Status: DISCONTINUED | OUTPATIENT
Start: 2024-05-03 | End: 2024-05-06 | Stop reason: HOSPADM

## 2024-05-03 RX ORDER — ONDANSETRON 4 MG/1
4 TABLET, ORALLY DISINTEGRATING ORAL EVERY 8 HOURS PRN
Qty: 10 TABLET | Refills: 0 | Status: SHIPPED | OUTPATIENT
Start: 2024-05-03 | End: 2024-05-06

## 2024-05-03 RX ADMIN — PANTOPRAZOLE SODIUM 40 MG: 40 TABLET, DELAYED RELEASE ORAL at 19:32

## 2024-05-03 RX ADMIN — AZITHROMYCIN MONOHYDRATE 500 MG: 500 INJECTION, POWDER, LYOPHILIZED, FOR SOLUTION INTRAVENOUS at 17:09

## 2024-05-03 RX ADMIN — ACETAMINOPHEN 650 MG: 325 TABLET, FILM COATED ORAL at 19:33

## 2024-05-03 RX ADMIN — KETOROLAC TROMETHAMINE 15 MG: 15 INJECTION, SOLUTION INTRAMUSCULAR; INTRAVENOUS at 12:52

## 2024-05-03 RX ADMIN — SODIUM CHLORIDE 1000 ML: 9 INJECTION, SOLUTION INTRAVENOUS at 12:53

## 2024-05-03 RX ADMIN — ONDANSETRON 4 MG: 2 INJECTION INTRAMUSCULAR; INTRAVENOUS at 12:53

## 2024-05-03 RX ADMIN — CEFTRIAXONE SODIUM 2 G: 2 INJECTION, POWDER, FOR SOLUTION INTRAMUSCULAR; INTRAVENOUS at 16:31

## 2024-05-03 RX ADMIN — SODIUM CHLORIDE 1000 ML: 9 INJECTION, SOLUTION INTRAVENOUS at 16:06

## 2024-05-03 RX ADMIN — ONDANSETRON 4 MG: 2 INJECTION INTRAMUSCULAR; INTRAVENOUS at 17:23

## 2024-05-03 RX ADMIN — CYCLOBENZAPRINE 5 MG: 5 TABLET, FILM COATED ORAL at 19:32

## 2024-05-03 ASSESSMENT — ACTIVITIES OF DAILY LIVING (ADL)
ADLS_ACUITY_SCORE: 38
ADLS_ACUITY_SCORE: 38
ADLS_ACUITY_SCORE: 35
ADLS_ACUITY_SCORE: 38
ADLS_ACUITY_SCORE: 35

## 2024-05-03 NOTE — ED TRIAGE NOTES
Pt diagnosed with influenza A 5/1. Pt was told by PCP to come to ER if symptoms have not resolved. Pt reports shortness of breath and productive cough.

## 2024-05-03 NOTE — ED PROVIDER NOTES
"This patient was signed out to me by Dr. Stevenson at shift change.  The chest x-ray and CMP are pending.    Chest x-ray showing multifocal pneumonia        Labs Ordered and Resulted from Time of ED Arrival to Time of ED Departure   COMPREHENSIVE METABOLIC PANEL - Abnormal       Result Value    Sodium 128 (*)     Potassium 3.7      Carbon Dioxide (CO2) 13 (*)     Anion Gap 16 (*)     Urea Nitrogen 24.5 (*)     Creatinine 1.19 (*)     GFR Estimate 66      Calcium 8.5 (*)     Chloride 99      Glucose 88      Alkaline Phosphatase 98      AST 34      ALT 17      Protein Total 7.1      Albumin 3.3 (*)     Bilirubin Total 0.5       Patient Vitals for the past 24 hrs:   BP Temp Temp src Pulse Resp SpO2 Height Weight   05/03/24 1534 122/63 -- -- 78 20 98 % -- --   05/03/24 1212 (!) 153/68 97.8  F (36.6  C) Temporal 85 20 97 % 1.727 m (5' 8\") 81.6 kg (180 lb)     General: Alert, No distress. Nontoxic appearance  Head: No signs of trauma.   Mouth/Throat: Oropharynx moist.   Eyes: Conjunctivae are normal. Pupils are equal..   Neck: Normal range of motion.    CV: Appears well perfused.  Resp:No respiratory distress.   MSK: Normal range of motion. No obvious deformity.   Neuro: The patient is alert and interactive. CORRAL. Speech normal. GCS 15  Skin: No lesion or sign of trauma noted.   Psych: normal mood and affect. behavior is normal.       ED course:  I informed the patient and his wife of the results.  The patient is continuing to feel extremely weak.  The patient is in agreement with admission to the hospital for further evaluation and treatment.    I spoke with Dr. Diggs.  The patient will be started on IV antibiotics for bacterial pneumonia.  Blood cultures and a lactic acid will be obtained.  Patient will be admitted to the under Dr. Diggs    Medications   cefTRIAXone (ROCEPHIN) 2 g vial to attach to  ml bag for ADULTS or NS 50 ml bag for PEDS (has no administration in time range)   azithromycin (ZITHROMAX) 500 mg " vial to attach to  mL bag (has no administration in time range)   sodium chloride 0.9% BOLUS 1,000 mL (has no administration in time range)   sodium chloride 0.9% BOLUS 1,000 mL (0 mLs Intravenous Stopped 5/3/24 8645)   ondansetron (ZOFRAN) injection 4 mg (4 mg Intravenous $Given 5/3/24 1253)   ketorolac (TORADOL) injection 15 mg (15 mg Intravenous $Given 5/3/24 1252)          Rafael Rogers MD  05/03/24 9484

## 2024-05-03 NOTE — ED NOTES
"[facility]  ED Nurse Handoff Report    ED Chief complaint: Cough and Shortness of Breath      ED Diagnosis:   Final diagnoses:   Influenza A   Weakness   Diarrhea, unspecified type   Multifocal pneumonia       Code Status: full code in 2022; admitting to confirm for this admission    Allergies:   Allergies   Allergen Reactions    Lisinopril Fatigue    Cipro [Quinolones] Other (See Comments)     Achy joints    Codeine Nausea     per h&p    Codeine Camsylate Nausea    Fluocinolone Other (See Comments)     \"achy joints\"    Statins Other (See Comments)     As provastatin- fatigue  On rovustatin  As of 10/1/14       Patient Story: diagnosed with influenza last week but has been having worsening cough and SOB.  Focused Assessment:  cough, SOB, SpO2 stable on room air and with activity  Labs Ordered and Resulted from Time of ED Arrival to Time of ED Departure   COMPREHENSIVE METABOLIC PANEL - Abnormal       Result Value    Sodium 128 (*)     Potassium 3.7      Carbon Dioxide (CO2) 13 (*)     Anion Gap 16 (*)     Urea Nitrogen 24.5 (*)     Creatinine 1.19 (*)     GFR Estimate 66      Calcium 8.5 (*)     Chloride 99      Glucose 88      Alkaline Phosphatase 98      AST 34      ALT 17      Protein Total 7.1      Albumin 3.3 (*)     Bilirubin Total 0.5     CBC WITH PLATELETS AND DIFFERENTIAL - Abnormal    WBC Count 9.8      RBC Count 3.98 (*)     Hemoglobin 12.6 (*)     Hematocrit 36.2 (*)     MCV 91      MCH 31.7      MCHC 34.8      RDW 13.5      Platelet Count 193      % Neutrophils 76      % Lymphocytes 13      % Monocytes 10      % Eosinophils 0      % Basophils 0      % Immature Granulocytes 1      NRBCs per 100 WBC 0      Absolute Neutrophils 7.4      Absolute Lymphocytes 1.3      Absolute Monocytes 1.0      Absolute Eosinophils 0.0      Absolute Basophils 0.0      Absolute Immature Granulocytes 0.1      Absolute NRBCs 0.0       XR Chest 2 Views   Final Result   IMPRESSION: Normal size of " cardiomediastinal silhouette. Patchy ground   glass opacities and consolidation throughout both lungs, suspicious   for multifocal pneumonia, recommend radiographic follow-up to   resolution. No pleural effusion or pneumothorax. No acute bony   abnormality. ACDF hardware and right shoulder arthroplasty are noted.      HAIR CANELA MD            SYSTEM ID:  [id number]            Treatments and/or interventions provided:   Medications   cefTRIAXone (ROCEPHIN) 2 g vial to attach to  ml bag for ADULTS or NS 50 ml bag for PEDS (2 g Intravenous $New Bag 5/3/24 1631)   azithromycin (ZITHROMAX) 500 mg vial to attach to  mL bag (has no administration in time range)   sodium chloride 0.9% BOLUS 1,000 mL (has no administration in time range)   aspirin EC tablet 81 mg (has no administration in time range)   cyclobenzaprine (FLEXERIL) tablet 5 mg (has no administration in time range)   diazepam (VALIUM) tablet 2 mg (has no administration in time range)   pantoprazole (PROTONIX) EC tablet 40 mg (has no administration in time range)   hydrochlorothiazide (HYDRODIURIL) tablet 50 mg ( Oral Automatically Held 5/6/24 0800)   lisinopril (ZESTRIL) tablet 20 mg ( Oral Automatically Held 5/6/24 0800)   metoprolol succinate ER (TOPROL XL) 24 hr tablet 50 mg (has no administration in time range)   vitamin C (ASCORBIC ACID) tablet 1,000 mg (has no administration in time range)   Zinc Acetate (Oral) CAPS 50 mg (has no administration in time range)   Multi-Vitamins TABS 1 tablet (has no administration in time range)   sodium chloride 0.9% BOLUS 1,000 mL (0 mLs Intravenous Stopped 5/3/24 1541)   ondansetron (ZOFRAN) injection 4 mg (4 mg Intravenous $Given 5/3/24 1253)   ketorolac (TORADOL) injection 15 mg (15 mg Intravenous $Given 5/3/24 1252)   sodium chloride 0.9% BOLUS 1,000 mL (1,000 mLs Intravenous $New Bag 5/3/24 1606)       Patient's response to treatments and/or interventions: tolerated    To be done/followed up on  "inpatient unit:  per admitting    Does this patient have any cognitive concerns?:  n/a    Activity level - Baseline/Home:  Independent  Activity Level - Current:   Independent    Patient's Preferred language: English   Needed?: No    Isolation: droplet  Infection: Influenza  Patient tested for COVID 19 prior to admission: NO  Bariatric?: No    Vital Signs:   Vitals:    05/03/24 1212 05/03/24 1534   BP: (!) 153/68 122/63   BP Location: Right arm Right arm   Pulse: 85 78   Resp: 20 20   Temp: 97.8  F (36.6  C)    TempSrc: Temporal    SpO2: 97% 98%   Weight: 81.6 kg (180 lb)    Height: 1.727 m (5' 8\")        Cardiac Rhythm:     Was the PSS-3 completed:     What interventions are required if any?               Family Comments: wife present in ED  OBS brochure/video discussed/provided to patient/family: No    For the majority of the shift this patient's behavior was Green.   Behavioral interventions performed were care and rounding.    ED NURSE PHONE NUMBER: *[phone]         "

## 2024-05-03 NOTE — H&P
[facility]    History and Physical  Hospitalist    Mane Chawla MRN# [id number]   Age: 68 year old YOB: 1955     Date of Admission:  5/3/2024    Primary care provider: Filemon Magana          Assessment and Plan:     Mane Chawla is a 68 year old  male with medical history of hypertension, hyperlipidemia, psoriatic arthritis, GERD presented to ED with generalized weakness.    Patient reports viral symptoms including runny nose, cough with low-grade fever for about 7 days, seen in family medicine clinic on 5/1.  Reports tested positive for influenza A, recommended to manage with supportive care.    Multifocal pneumonia  Influenza A infection.  Now presenting with ongoing cough, shortness of breath, poor oral intake.  Reports has been taking NyQuil, ibuprofen round-the-clock with minimal improvement in symptoms.  --In ED temperature 97.8, bilateral coarse breath sounds.  WBC 9.8 Chest x-ray with patchy groundglass opacity and consolidation throughout both lungs suspicious for multifocal pneumonia.  --Admit to inpatient unit.  Follow blood cultures, lactic acid levels.  Will start on IV ceftriaxone, IV azithromycin to cover for bacterial infection.  Supportive care with Tylenol, Robitussin, lozenges  Ordered COVID-19 swab, CRP levels.  If COVID swab positive will consider starting on prednisone plus antivirals.   Follow procalcitonin level, WBC count, fever curve.  Given chest discomfort EKG, troponin ordered.  If concerns will place on telemetry monitoring overnight.  Aggressive incentive spirometry, Acapella.  Oxygen as needed to keep O2 sats greater than 91%.  Follow-up imaging of chest in 6 to 8 weeks for resolution.    Diarrhea likely viral illness - improving   Reporting diarrhea multiple loose watery bowel movements over the last few days, had 2-3 yesterday.    Continue to monitor.  If ongoing diarrhea will send out stool for enteric pathogens    Hyponatremia  "likely from dehydration, diuretic use.  Anion gap metabolic acidosis.  Mild acute kidney injury likely prerenal  Sodium 128.Bicarb 13, anion gap 16.  Creatinine 1.19.   Received 2 L fluid bolus in the ED.  Will check BMP stat.  1000 cc fluid bolus over the next 10 hours.  Hold PTA lisinopril, hydrochlorothiazide.    Mild hypoalbuminemia from poor oral intake, acute illness.  Dietary supplements with Ensure.    Hypertension.  History of SVT.  Continue PTA metoprolol.  Hold PTA lisinopril, hydrochlorothiazide.  Continue PTA aspirin.  As needed IV hydralazine ordered.    GERD.  Lymphocytic esophagitis  Continue PTA PPI.    Psoriatic arthritis.  Back pain status postlaminectomy.  As needed Tylenol.  Continue PTA as needed Valium, Flexeril as needed for spasms    Physical deconditioning from medical illness.  Encourage ambulation as able to tolerate.    Clinically Significant Risk Factors Present on Admission         # Hyponatremia: Lowest Na = 128 mmol/L in last 2 days, will monitor as appropriate      # Hypoalbuminemia: Lowest albumin = 3.3 g/dL at 5/3/2024 12:53 PM, will monitor as appropriate   # Drug Induced Platelet Defect: home medication list includes an antiplatelet medication   # Hypertension: Noted on problem list      # Overweight: Estimated body mass index is 27.37 kg/m  as calculated from the following:    Height as of this encounter: 1.727 m (5' 8\").    Weight as of this encounter: 81.6 kg (180 lb).              DVT Prophylaxis: SCDs, ambulate.  Subcutaneous Lovenox.  Code Status: Full code.  Discussed with patient and his wife    Disposition: Expected discharge in 2 days pending clinical improvement  Medically Ready for Discharge: Anticipated in 2-4 Days     More than 70% of time spent in direct patient care, care coordination, patient counseling, and formalizing plan of care.   Discussed with patient, his wife by the bedside and ED physician, ED RN    Sejal Diggs MD          Chief Complaint: "     History is obtained from patient, his wife at the bedside and ED team.    Mane Chawla is a 68 year old  male with medical history of hypertension, hyperlipidemia, psoriatic arthritis, GERD presented to ED with generalized weakness.    Patient reports viral symptoms including runny nose, cough with low-grade fever for about 7 days, seen in family medicine clinic on 5/1.  Reports tested positive for influenza A, recommended to manage with supportive care.    Now presenting with ongoing cough, shortness of breath, poor oral intake.  Reports has been taking NyQuil, ibuprofen round-the-clock with minimal improvement in symptoms.  Reporting headache on and off.  Reports chest discomfort on and off, worse with coughing.  Denies any chest pain.  Denies palpitations.  No PND.  No orthopnea.  Reports nausea, poor oral intake.  Reporting diarrhea multiple loose watery bowel movements over the last few days, had 2-3 yesterday.  Denies any blood in the stools  Reports decreased urine output.  Reporting generalized weakness.  Denies any recent travel.  Reports wife has also been sick, tested positive for influenza and on antivirals.    In ED temperature 97.8, bilateral coarse breath sounds.  WBC 9.8, hemoglobin 12.6.  Sodium 128.  Bicarb 13, anion gap 16.  Creatinine 1.19.    Chest x-ray with patchy groundglass opacity and consolidation throughout both lungs suspicious for multifocal pneumonia.          Review of Systems:     GENERAL: see HPI   EENT: No new vision change  PULMONARY:see HPI   CARDIAC:  no irregular or fast heart beats   GI: No abdominal pain  : no hematuria.   NEUROno loss of consciousness  ENDOCRINE: No excessive thirst  MUSCULOSKELETAL: No joint pain   SKIN: No skin rashes  PSYCHIATRY no anxiety     Medical History:     Past Medical History:   Diagnosis Date    ACP (advance care planning) 3-25-13    form given    GERD (gastroesophageal reflux disease)     HTN, goal below 140/80     Hypercholesterolemia  "    Hyperlipidemia LDL goal <160 6/15/2011    Psoriatic arthritis (H)         Surgical History:      Past Surgical History:   Procedure Laterality Date    ARTHROSCOPY KNEE RT/LT      CARPAL TUNNEL RELEASE RT/LT      FUSION CERVICAL ANTERIOR THREE+ LEVELS      FUSION LUMBAR ANTERIOR ONE LEVEL      HC SHOULDER ARTHROSCOPY,PARTIAL DEBRIDEMENT Right     Unable to due total shoulder    ROTATOR CUFF REPAIR RT/LT               Social History:      Social History     Tobacco Use    Smoking status: Former     Current packs/day: 0.00     Average packs/day: 1 pack/day for 30.0 years (30.0 ttl pk-yrs)     Types: Cigarettes     Start date: 3/21/1966     Quit date: 3/21/1996     Years since quittin.1    Smokeless tobacco: Never   Substance Use Topics    Alcohol use: Yes     Alcohol/week: 8.3 standard drinks of alcohol     Types: 10 Standard drinks or equivalent per week             Family History:     Family History   Problem Relation Age of Onset    Respiratory Mother     Cerebrovascular Disease Father     Alzheimer Disease Father              Allergies:     Allergies   Allergen Reactions    Lisinopril Fatigue    Cipro [Quinolones] Other (See Comments)     Achy joints    Codeine Nausea     per h&p    Codeine Camsylate Nausea    Fluocinolone Other (See Comments)     \"achy joints\"    Statins Other (See Comments)     As provastatin- fatigue  On rovustatin  As of 10/1/14             Medications:   Home meds reviewed          Physical Exam      Admission Weight: 81.6 kg (180 lb)    Vital Signs with Ranges  Temp:  [97.8  F (36.6  C)] 97.8  F (36.6  C)  Pulse:  [78-85] 78  Resp:  [20] 20  BP: (122-153)/(63-68) 122/63  SpO2:  [97 %-98 %] 98 %    PHYSICAL EXAM  GENERAL: Patient is in no distress. Alert and oriented, coughing +  HEENT: Oropharynx dry  HEART: Regular rate and rhythm. S1S2. No murmurs   LUNGS bilateral coarse breath sounds.    Respirations unlabored  ABDOMEN: Soft, no abdominal tenderness, bowel sounds heard "   NEURO: Moving all extremities.  EXTREMITIES: No pedal edema.   SKIN: Warm, dry. No rash  PSYCHIATRY Cooperative         Data:   All new lab and imaging data was reviewed.

## 2024-05-03 NOTE — ED PROVIDER NOTES
History   Chief Complaint:  Cough and Shortness of Breath     HPI   Mane Chawla is a 68 year old male who presents with increasing weakness, diarrhea and nausea associated with a recent diagnosis of influenza A.  Patient denies any personal history of asthma or COPD.  He does not use oxygen at home.  He states he has been feeling increasingly weak and dehydrated and feels like he is worsening in terms of his influenza.  He is not currently on antibiotics.  Has been taking NyQuil as well as ibuprofen around-the-clock with minimal improvements in his symptoms.  He denies any active chest pain but does feel short of breath.  He denies abdominal pain denies vomiting but does endorse nausea and diarrhea.  No blood in stool.    Independent Historian:   None    Review of External Notes:   Family medicine clinic visit from yesterday 5/1/2024.  Patient had had 4 to 5 days of rhinorrhea and cough but no fever or chills.  He has been producing greenish phlegm.  He ultimately had a influenza and COVID swab obtained.  Influenza returned positive for influenza A.    Medications:    benzonatate (TESSALON) 200 MG capsule  loperamide (IMODIUM A-D) 2 MG tablet  ondansetron (ZOFRAN ODT) 4 MG ODT tab  amoxicillin (AMOXIL) 500 MG capsule  aspirin (ASA) 81 MG EC tablet  CIMETIDINE ACID REDUCER PO  cyclobenzaprine (FLEXERIL) 5 MG tablet  diazepam (VALIUM) 5 MG tablet  esomeprazole (NEXIUM) 40 MG DR capsule  fish oil-omega-3 fatty acids 1000 MG capsule  hydrochlorothiazide (HYDRODIURIL) 50 MG tablet  ibuprofen (ADVIL/MOTRIN) 800 MG tablet  lisinopril (ZESTRIL) 20 MG tablet  metoprolol succinate ER (TOPROL XL) 50 MG 24 hr tablet  Multiple Vitamin (MULTI-VITAMINS) TABS  nystatin-triamcinolone (MYCOLOG II) 000925-0.1 UNIT/GM-% external cream  omeprazole (PRILOSEC) 10 MG DR capsule  tadalafil (CIALIS) 5 MG tablet  Turmeric 500 MG TABS  vitamin C (ASCORBIC ACID) 1000 MG TABS  Zinc Acetate, Oral, (GALZIN) 50 MG CAPS      Past Medical  "History:    Past Medical History:   Diagnosis Date    ACP (advance care planning) 3-25-13    GERD (gastroesophageal reflux disease)     HTN, goal below 140/80     Hypercholesterolemia     Hyperlipidemia LDL goal <160 6/15/2011    Psoriatic arthritis (H)      Past Surgical History:    Past Surgical History:   Procedure Laterality Date    ARTHROSCOPY KNEE RT/LT      CARPAL TUNNEL RELEASE RT/LT      FUSION CERVICAL ANTERIOR THREE+ LEVELS      FUSION LUMBAR ANTERIOR ONE LEVEL      HC SHOULDER ARTHROSCOPY,PARTIAL DEBRIDEMENT Right 2023    Unable to due total shoulder    ROTATOR CUFF REPAIR RT/LT        Physical Exam   Patient Vitals for the past 24 hrs:   BP Temp Temp src Pulse Resp SpO2 Height Weight   05/03/24 1212 (!) 153/68 97.8  F (36.6  C) Temporal 85 20 97 % 1.727 m (5' 8\") 81.6 kg (180 lb)      General: Alert, appears well-developed and well-nourished. Cooperative.     In mild distress  HEENT:  Head:  Atraumatic  Ears:  External ears are normal  Mouth/Throat:  Oropharynx is without erythema or exudate and mucous membranes are dry.   Eyes:   Conjunctivae normal and EOM are normal. No scleral icterus.  CV:  Normal rate, regular rhythm, normal heart sounds and radial pulses are 2+ and symmetric.  No murmur.  Resp:  Breath sounds are clear bilaterally, no wheezing.     Non-labored, no retractions or accessory muscle use  GI:  Abdomen is soft, no distension, no tenderness. No rebound or guarding.  No CVA tenderness bilaterally  MS:  Normal range of motion. No edema.    Normal strength in all 4 extremities.     Back atraumatic.    No midline cervical, thoracic, or lumbar tenderness  Skin:  Warm and dry.  No rash or lesions noted.  Neuro:   Alert. Normal strength.  GCS: 15  Psych: Normal mood and affect.    Emergency Department Course   No results found for this or any previous visit.  Imaging:  XR Chest 2 Views    (Results Pending)   Report per radiology    Laboratory:  Labs Ordered and Resulted from Time of ED Arrival " to Time of ED Departure   CBC WITH PLATELETS AND DIFFERENTIAL - Abnormal       Result Value    WBC Count 9.8      RBC Count 3.98 (*)     Hemoglobin 12.6 (*)     Hematocrit 36.2 (*)     MCV 91      MCH 31.7      MCHC 34.8      RDW 13.5      Platelet Count 193      % Neutrophils 76      % Lymphocytes 13      % Monocytes 10      % Eosinophils 0      % Basophils 0      % Immature Granulocytes 1      NRBCs per 100 WBC 0      Absolute Neutrophils 7.4      Absolute Lymphocytes 1.3      Absolute Monocytes 1.0      Absolute Eosinophils 0.0      Absolute Basophils 0.0      Absolute Immature Granulocytes 0.1      Absolute NRBCs 0.0     COMPREHENSIVE METABOLIC PANEL        Procedures     Emergency Department Course & Assessments:     Interventions:  Medications   sodium chloride 0.9% BOLUS 1,000 mL (1,000 mLs Intravenous $New Bag 5/3/24 1253)   ondansetron (ZOFRAN) injection 4 mg (4 mg Intravenous $Given 5/3/24 1253)   ketorolac (TORADOL) injection 15 mg (15 mg Intravenous $Given 5/3/24 1252)      Independent Interpretation (X-rays, CTs, rhythm strip):  Independently reviewed chest x-ray imaging which shows no evidence of pneumothorax    Consultations/Discussion of Management or Tests:        Social Determinants of Health affecting care:   None    Disposition:  Care signed out to Dr. Rogers pending CXR and BMP results and final recheck prior to discharge.      Impression & Plan    CMS Diagnoses: None    Medical Decision Making:  Mane Chawla is a 68 year old male who presents for evaluation of cough, myalgias, diarrhea and generalized weakness.  Recently confirmed influenza A on viral swab yesterday.  His symptoms today are consistent with influenza.   The patient is out of treatment window for influenza and medications ordered as noted above.  Given diarrhea and nausea we did obtain blood work and provided IV fluids and antiemetics in the emergency department today.  He will be prescribed Imodium and Zofran for ongoing  symptomatic management.  Additionally Tessalon Perles have been prescribed for ongoing cough symptoms.  Thankfully the patient is not hypoxic on ambulatory assessment in the emergency department.  CXR pending at time of sign out.  Close followup of primary care physician is indicated and return to the ED for high fevers > 103 for more than 48 hours more, increasing productive cough, shortness of breath, or confusion.  There is no signs of serious bacterial infection such as bacteremia, meningitis, UTI/pyelonephritis, strep pharyngitis, etc.      I suspect patient will be able to discharge home, but we are awaiting BMP and CXR results prior to final disposition.  Assuming these are normal, continue with plans for discharge.     Diagnosis:    ICD-10-CM    1. Influenza A  J10.1       2. Weakness  R53.1       3. Diarrhea, unspecified type  R19.7          Discharge Medications:  New Prescriptions    BENZONATATE (TESSALON) 200 MG CAPSULE    Take 1 capsule (200 mg) by mouth 3 times daily as needed for cough    LOPERAMIDE (IMODIUM A-D) 2 MG TABLET    Take 1-2 tablets (2-4 mg) by mouth 3 times daily as needed for diarrhea    ONDANSETRON (ZOFRAN ODT) 4 MG ODT TAB    Take 1 tablet (4 mg) by mouth every 8 hours as needed for nausea or vomiting      5/3/2024   Rm Stevenson MD White, Scott, MD  05/03/24 3680

## 2024-05-03 NOTE — ED NOTES
Pt was able to walk down the [place] and back on his own without the need of any assistance. Before starting to walk, the Pt's O2 sats were 98%. Throughout the walk his O2 sats stayed between 96-98%.

## 2024-05-04 ENCOUNTER — TRANSFERRED RECORDS (OUTPATIENT)
Dept: FAMILY MEDICINE | Facility: CLINIC | Age: 69
End: 2024-05-04

## 2024-05-04 LAB
ANION GAP SERPL CALCULATED.3IONS-SCNC: 15 MMOL/L (ref 7–15)
ATRIAL RATE - MUSE: 79 BPM
BUN SERPL-MCNC: 15.8 MG/DL (ref 8–23)
CALCIUM SERPL-MCNC: 8.6 MG/DL (ref 8.8–10.2)
CHLORIDE SERPL-SCNC: 103 MMOL/L (ref 98–107)
CREAT SERPL-MCNC: 1.15 MG/DL (ref 0.67–1.17)
DEPRECATED HCO3 PLAS-SCNC: 15 MMOL/L (ref 22–29)
DIASTOLIC BLOOD PRESSURE - MUSE: NORMAL MMHG
EGFRCR SERPLBLD CKD-EPI 2021: 68 ML/MIN/1.73M2
ERYTHROCYTE [DISTWIDTH] IN BLOOD BY AUTOMATED COUNT: 13.7 % (ref 10–15)
GLUCOSE SERPL-MCNC: 124 MG/DL (ref 70–99)
HCT VFR BLD AUTO: 36.3 % (ref 40–53)
HGB BLD-MCNC: 12.3 G/DL (ref 13.3–17.7)
INTERPRETATION ECG - MUSE: NORMAL
MCH RBC QN AUTO: 31.5 PG (ref 26.5–33)
MCHC RBC AUTO-ENTMCNC: 33.9 G/DL (ref 31.5–36.5)
MCV RBC AUTO: 93 FL (ref 78–100)
P AXIS - MUSE: 67 DEGREES
PLATELET # BLD AUTO: 204 10E3/UL (ref 150–450)
POTASSIUM SERPL-SCNC: 3.7 MMOL/L (ref 3.4–5.3)
PR INTERVAL - MUSE: 136 MS
QRS DURATION - MUSE: 98 MS
QT - MUSE: 370 MS
QTC - MUSE: 424 MS
R AXIS - MUSE: 63 DEGREES
RBC # BLD AUTO: 3.91 10E6/UL (ref 4.4–5.9)
SODIUM SERPL-SCNC: 133 MMOL/L (ref 135–145)
SYSTOLIC BLOOD PRESSURE - MUSE: NORMAL MMHG
T AXIS - MUSE: 21 DEGREES
VENTRICULAR RATE- MUSE: 79 BPM
WBC # BLD AUTO: 9.1 10E3/UL (ref 4–11)

## 2024-05-04 PROCEDURE — 36415 COLL VENOUS BLD VENIPUNCTURE: CPT | Performed by: HOSPITALIST

## 2024-05-04 PROCEDURE — 85027 COMPLETE CBC AUTOMATED: CPT | Performed by: HOSPITALIST

## 2024-05-04 PROCEDURE — 120N000001 HC R&B MED SURG/OB

## 2024-05-04 PROCEDURE — 250N000013 HC RX MED GY IP 250 OP 250 PS 637: Performed by: HOSPITALIST

## 2024-05-04 PROCEDURE — 250N000011 HC RX IP 250 OP 636: Performed by: HOSPITALIST

## 2024-05-04 PROCEDURE — 80048 BASIC METABOLIC PNL TOTAL CA: CPT | Performed by: HOSPITALIST

## 2024-05-04 PROCEDURE — 99232 SBSQ HOSP IP/OBS MODERATE 35: CPT | Performed by: HOSPITALIST

## 2024-05-04 RX ADMIN — MULTIVITAMIN TABLET 1 TABLET: TABLET at 08:43

## 2024-05-04 RX ADMIN — AZITHROMYCIN MONOHYDRATE 500 MG: 500 INJECTION, POWDER, LYOPHILIZED, FOR SOLUTION INTRAVENOUS at 17:33

## 2024-05-04 RX ADMIN — ZINC SULFATE 220 MG (50 MG) CAPSULE 220 MG: CAPSULE at 08:44

## 2024-05-04 RX ADMIN — GUAIFENESIN AND DEXTROMETHORPHAN 10 ML: 100; 10 SYRUP ORAL at 12:41

## 2024-05-04 RX ADMIN — GUAIFENESIN AND DEXTROMETHORPHAN 10 ML: 100; 10 SYRUP ORAL at 01:20

## 2024-05-04 RX ADMIN — ASPIRIN 81 MG: 81 TABLET, COATED ORAL at 08:44

## 2024-05-04 RX ADMIN — CEFTRIAXONE SODIUM 2 G: 2 INJECTION, POWDER, FOR SOLUTION INTRAMUSCULAR; INTRAVENOUS at 16:50

## 2024-05-04 RX ADMIN — ONDANSETRON 4 MG: 4 TABLET, ORALLY DISINTEGRATING ORAL at 18:13

## 2024-05-04 RX ADMIN — ACETAMINOPHEN 650 MG: 325 TABLET, FILM COATED ORAL at 08:43

## 2024-05-04 RX ADMIN — GUAIFENESIN AND DEXTROMETHORPHAN 10 ML: 100; 10 SYRUP ORAL at 18:43

## 2024-05-04 RX ADMIN — PANTOPRAZOLE SODIUM 40 MG: 40 TABLET, DELAYED RELEASE ORAL at 16:48

## 2024-05-04 RX ADMIN — OXYCODONE HYDROCHLORIDE AND ACETAMINOPHEN 1000 MG: 500 TABLET ORAL at 08:44

## 2024-05-04 RX ADMIN — ACETAMINOPHEN 650 MG: 325 TABLET, FILM COATED ORAL at 16:48

## 2024-05-04 RX ADMIN — ENOXAPARIN SODIUM 40 MG: 40 INJECTION SUBCUTANEOUS at 08:45

## 2024-05-04 RX ADMIN — ACETAMINOPHEN 650 MG: 325 TABLET, FILM COATED ORAL at 12:41

## 2024-05-04 RX ADMIN — PANTOPRAZOLE SODIUM 40 MG: 40 TABLET, DELAYED RELEASE ORAL at 08:44

## 2024-05-04 RX ADMIN — GUAIFENESIN AND DEXTROMETHORPHAN 10 ML: 100; 10 SYRUP ORAL at 08:46

## 2024-05-04 RX ADMIN — METOPROLOL SUCCINATE 50 MG: 50 TABLET, EXTENDED RELEASE ORAL at 08:44

## 2024-05-04 ASSESSMENT — ACTIVITIES OF DAILY LIVING (ADL)
ADLS_ACUITY_SCORE: 38
ADLS_ACUITY_SCORE: 21
ADLS_ACUITY_SCORE: 38
FALL_HISTORY_WITHIN_LAST_SIX_MONTHS: NO
ADLS_ACUITY_SCORE: 21
ADLS_ACUITY_SCORE: 21
ADLS_ACUITY_SCORE: 38
ADLS_ACUITY_SCORE: 38
ADLS_ACUITY_SCORE: 21
ADLS_ACUITY_SCORE: 38
ADLS_ACUITY_SCORE: 21
CHANGE_IN_FUNCTIONAL_STATUS_SINCE_ONSET_OF_CURRENT_ILLNESS/INJURY: NO
ADLS_ACUITY_SCORE: 21
ADLS_ACUITY_SCORE: 38
ADLS_ACUITY_SCORE: 21
ADLS_ACUITY_SCORE: 21
ADLS_ACUITY_SCORE: 38

## 2024-05-04 NOTE — PLAN OF CARE
Goal Outcome Evaluation:      Plan of Care Reviewed With: patient    Summary:  Influenza A, pneumonia   DATE & TIME: 5/4/24  [id number]   Cognitive Concerns/ Orientation : A&Ox4   BEHAVIOR & AGGRESSION TOOL COLOR: Green  ABNL VS/O2: VSS RA  MOBILITY: ind  PAIN MANAGMENT: Robitussin x3 for cough Tylenol x3 for headache and back pain; Zofran x1   DIET: regular  BOWEL/BLADDER: continent  ABNL LAB/BG: Na 133  DRAIN/DEVICES: L PIV SL  TELEMETRY RHYTHM: N/A  SKIN: intact  TESTS/PROCEDURES: N/A  D/C DAY/GOALS/PLACE: Most likely tomorrow 5/5  OTHER IMPORTANT INFO: hoarse cough

## 2024-05-04 NOTE — PLAN OF CARE
DATE & TIME: 5/3/24 3pm-11pm    Cognitive Concerns/ Orientation : A&Ox4   BEHAVIOR & AGGRESSION TOOL COLOR: Green  ABNL VS/O2: VSS/Room air  MOBILITY: SBA  PAIN MANAGMENT: tylenol and flexeril prn  DIET: regular  BOWEL/BLADDER: continent  ABNL LAB/BG: Na 128, Creat 1.19  DRAIN/DEVICES: L PIV  SKIN: intact  D/C DAY/GOALS/PLACE: pending

## 2024-05-04 NOTE — PROGRESS NOTES
[facility]  Hospitalist Progress Note   05/04/2024          Assessment and Plan:       Mane Chawla is a 68 year old  male with medical history of hypertension, hyperlipidemia, psoriatic arthritis, GERD presented to ED with generalized weakness.     Patient reports viral symptoms including runny nose, cough with low-grade fever for about 7 days, seen in family medicine clinic on 5/1.  Reports tested positive for influenza A, recommended to manage with supportive care.     Multifocal pneumonia.  Influenza A infection.  -Presented with ongoing cough, shortness of breath, poor oral intake, little relief of symptoms with supportive care.  -At the time of admission no hypoxia, afebrile.  Bilateral coarse breath sounds.  WBC 9.8.  Lactic acid 0.8.  Procalcitonin 1.62.  .57.  Troponin high-sensitivity 9. EKG sinus rhythm, heart rate 79, QTc 424.  RSV, COVID-19 PCR negative.  Influenza A swab positive   Chest x-ray with patchy groundglass opacity and consolidation throughout both lungs suspicious for multifocal pneumonia.  -- Continue IV ceftriaxone, IV azithromycin [5/3], follow pending blood cultures.  Switch to oral antibiotics likely time of discharge.  Supportive care with Tylenol, Robitussin, lozenges  Trend fever curve.  Follow CRP.  Aggressive incentive spirometry, Acapella.  Oxygen as needed to keep O2 sats greater than 91%.  Follow-up imaging of chest in 6 to 8 weeks for resolution.     Diarrhea likely viral illness - improving   Reporting diarrhea multiple loose watery bowel movements over the last few days, symptoms improving.  Continue to monitor.  If ongoing diarrhea will send out stool for enteric pathogens     Hyponatremia likely from dehydration, diuretic use - improving   Anion gap metabolic acidosis.  Mild acute kidney injury likely prerenal  Sodium 128.Bicarb 13, anion gap 16.  Creatinine 1.19.   Received 3 L IV fluid with pictures sodium improving, anion gap closed.  Continue  to hold PTA hydrochlorothiazide.  Monitor renal function in AM.     Mild hypoalbuminemia from poor oral intake, acute illness.  Dietary supplements with Ensure.     Hypertension.  History of SVT.  Continue PTA metoprolol.  Will resume PTA lisinopril 5/4.  Continue to hold PTA hydrochlorothiazide  Continue PTA aspirin.  As needed IV hydralazine ordered.     GERD.  Lymphocytic esophagitis  Continue PTA PPI.     Psoriatic arthritis.  Back pain status postlaminectomy.  As needed Tylenol.  Continue PTA as needed Valium, Flexeril as needed for spasms     Physical deconditioning from medical illness.  Encourage ambulation as able to tolerate.     Orders Placed This Encounter      Combination Diet Regular Diet Adult      DVT Prophylaxis: SCDs, ambulate.  Subcutaneous Lovenox.  Code Status: Full Code  Disposition: Expected discharge 5/5    Medically Ready for Discharge: Anticipated Tomorrow    Discussed with patient, his wife by the bedside, bedside RN  >35 minutes spent by me on the date of service doing chart review, history, exam, documentation & further activities per the note.     Sejal Diggs MD        Interval History:        Patient lying in bed.  Denies any chest pain or palpitations.  Reports some shortness of breath with minimal ambulation.  Reports dry cough, unable to sleep at night.  Denies any abdominal pain.  Diarrhea improving.    Reports tolerating oral diet, small amounts.  No vomiting.  Afebrile.  No hypoxia       Physical Exam:        Physical Exam   Temp:  [98.4  F (36.9  C)-98.9  F (37.2  C)] 98.4  F (36.9  C)  Pulse:  [78-93] 85  Resp:  [18-20] 18  BP: (122-152)/(63-75) 143/74  SpO2:  [96 %-98 %] 96 %    Intake/Output Summary (Last 24 hours) at 5/4/2024 1313  Last data filed at 5/3/2024 1900  Gross per 24 hour   Intake 240 ml   Output --   Net 240 ml       Admission Weight: 81.6 kg (180 lb)  Current Weight: 81.5 kg (179 lb 10.8 oz)    PHYSICAL EXAM  GENERAL: Patient is in no distress.  Dry cough    HEART: Regular rate and rhythm. S1S2. No murmurs   LUNGS bilateral coarse breath sounds.    Respirations unlabored  NEURO: Moving all extremities.  EXTREMITIES: No pedal edema.   SKIN: Warm, dry. No rash  PSYCHIATRY Cooperative       Medications:        Current Facility-Administered Medications   Medication Dose Route Frequency Provider Last Rate Last Admin    aspirin EC tablet 81 mg  81 mg Oral Daily Sejal Diggs MD   81 mg at 05/04/24 0844    azithromycin (ZITHROMAX) 500 mg vial to attach to  mL bag  500 mg Intravenous Q24H Sejal Diggs MD        cefTRIAXone (ROCEPHIN) 2 g vial to attach to  ml bag for ADULTS or NS 50 ml bag for PEDS  2 g Intravenous Q24H Sejal Diggs MD        enoxaparin ANTICOAGULANT (LOVENOX) injection 40 mg  40 mg Subcutaneous Q24H Sejal Diggs MD   40 mg at 05/04/24 0845    [Held by provider] hydrochlorothiazide (HYDRODIURIL) tablet 50 mg  50 mg Oral Daily Sejal Diggs MD        [Held by provider] lisinopril (ZESTRIL) tablet 20 mg  20 mg Oral Daily Sejal Diggs MD        metoprolol succinate ER (TOPROL XL) 24 hr tablet 50 mg  50 mg Oral Daily Sejal Diggs MD   50 mg at 05/04/24 0844    multivitamin, therapeutic (THERA-VIT) tablet 1 tablet  1 tablet Oral Daily Sejal Diggs MD   1 tablet at 05/04/24 0843    pantoprazole (PROTONIX) EC tablet 40 mg  40 mg Oral BID AC Sejal Diggs MD   40 mg at 05/04/24 0844    sodium chloride (PF) 0.9% PF flush 3 mL  3 mL Intracatheter Q8H Sejal Diggs MD   3 mL at 05/04/24 1013    sodium chloride 0.9% BOLUS 1,000 mL  1,000 mL Intravenous Once Sejal Diggs MD   ED/Periop/Clinic Infusing on Admission/transfer at 05/03/24 1812    vitamin C (ASCORBIC ACID) tablet 1,000 mg  1,000 mg Oral Daily Sejal Diggs MD   1,000 mg at 05/04/24 0844    zinc sulfate (ZINCATE) capsule 220 mg  220 mg Oral Daily Sejal Diggs MD   220 mg at 05/04/24 0855     Current Facility-Administered  Medications   Medication Dose Route Frequency Provider Last Rate Last Admin    acetaminophen (TYLENOL) tablet 650 mg  650 mg Oral Q4H PRSejal Cruz MD   650 mg at 05/04/24 1241    Or    acetaminophen (TYLENOL) Suppository 650 mg  650 mg Rectal Q4H PRSejal Cruz MD        benzocaine-menthol (CHLORASEPTIC) 6-10 MG lozenge 1 lozenge  1 lozenge Buccal Q1H PRSejal Cruz MD        calcium carbonate (TUMS) chewable tablet 1,000 mg  1,000 mg Oral 4x Daily PRN Sejal Diggs MD        cyclobenzaprine (FLEXERIL) tablet 5 mg  5 mg Oral Daily PRN Sejal Diggs MD   5 mg at 05/03/24 1932    diazepam (VALIUM) tablet 2 mg  2 mg Oral At Bedtime PRSejal Cruz MD        guaiFENesin-dextromethorphan (ROBITUSSIN DM) 100-10 MG/5ML syrup 10 mL  10 mL Oral Q4H PRSejal Cruz MD   10 mL at 05/04/24 1241    hydrALAZINE (APRESOLINE) injection 10 mg  10 mg Intravenous Q4H PRSejal Cruz MD        lidocaine (LMX4) cream   Topical Q1H PRSejal Cruz MD        lidocaine 1 % 0.1-1 mL  0.1-1 mL Other Q1H PRSejal Cruz MD        ondansetron (ZOFRAN ODT) ODT tab 4 mg  4 mg Oral Q6H PRSejal Cruz MD        Or    ondansetron (ZOFRAN) injection 4 mg  4 mg Intravenous Q6H PRSejal Cruz MD   4 mg at 05/03/24 1723    senna-docusate (SENOKOT-S/PERICOLACE) 8.6-50 MG per tablet 1 tablet  1 tablet Oral BID PRSejal Cruz MD        Or    senna-docusate (SENOKOT-S/PERICOLACE) 8.6-50 MG per tablet 2 tablet  2 tablet Oral BID PRSejal Cruz MD        sodium chloride (PF) 0.9% PF flush 3 mL  3 mL Intracatheter q1 min prn Sejal Diggs MD                Data:      All new lab and imaging data was reviewed.

## 2024-05-04 NOTE — PLAN OF CARE
Goal Outcome Evaluation:      Plan of Care Reviewed With: patient        Summary:  Influenza A, pneumonia   DATE & TIME:5/3/24-5/4/24  [id number]    Cognitive Concerns/ Orientation : A&Ox4   BEHAVIOR & AGGRESSION TOOL COLOR: Green  ABNL VS/O2: VSS/Room air  MOBILITY: SBA  PAIN MANAGMENT: Robitussin x1 for cough   DIET: regular  BOWEL/BLADDER: continent  ABNL LAB/BG: Na 128, Creat 1.19  DRAIN/DEVICES: L PIV SL  TELEMETRY RHYTHM: N/A  SKIN: intact  TESTS/PROCEDURES: N/A  D/C DAY/GOALS/PLACE: pending  OTHER IMPORTANT INFO:

## 2024-05-05 LAB
ANION GAP SERPL CALCULATED.3IONS-SCNC: 16 MMOL/L (ref 7–15)
BUN SERPL-MCNC: 14.9 MG/DL (ref 8–23)
CALCIUM SERPL-MCNC: 8.9 MG/DL (ref 8.8–10.2)
CHLORIDE SERPL-SCNC: 103 MMOL/L (ref 98–107)
CREAT SERPL-MCNC: 1.09 MG/DL (ref 0.67–1.17)
CRP SERPL-MCNC: 100.89 MG/L
DEPRECATED HCO3 PLAS-SCNC: 15 MMOL/L (ref 22–29)
EGFRCR SERPLBLD CKD-EPI 2021: 73 ML/MIN/1.73M2
GLUCOSE SERPL-MCNC: 102 MG/DL (ref 70–99)
HGB BLD-MCNC: 11.6 G/DL (ref 13.3–17.7)
HGB BLD-MCNC: 11.7 G/DL (ref 13.3–17.7)
POTASSIUM SERPL-SCNC: 3.6 MMOL/L (ref 3.4–5.3)
SODIUM SERPL-SCNC: 134 MMOL/L (ref 135–145)

## 2024-05-05 PROCEDURE — 80048 BASIC METABOLIC PNL TOTAL CA: CPT | Performed by: HOSPITALIST

## 2024-05-05 PROCEDURE — 85018 HEMOGLOBIN: CPT | Performed by: HOSPITALIST

## 2024-05-05 PROCEDURE — 250N000013 HC RX MED GY IP 250 OP 250 PS 637: Performed by: HOSPITALIST

## 2024-05-05 PROCEDURE — 36415 COLL VENOUS BLD VENIPUNCTURE: CPT | Performed by: HOSPITALIST

## 2024-05-05 PROCEDURE — 86140 C-REACTIVE PROTEIN: CPT | Performed by: HOSPITALIST

## 2024-05-05 PROCEDURE — 99232 SBSQ HOSP IP/OBS MODERATE 35: CPT | Performed by: HOSPITALIST

## 2024-05-05 PROCEDURE — 258N000003 HC RX IP 258 OP 636: Performed by: HOSPITALIST

## 2024-05-05 PROCEDURE — 250N000011 HC RX IP 250 OP 636: Performed by: HOSPITALIST

## 2024-05-05 PROCEDURE — 120N000001 HC R&B MED SURG/OB

## 2024-05-05 RX ORDER — CYCLOBENZAPRINE HCL 5 MG
5 TABLET ORAL EVERY 8 HOURS PRN
Status: DISCONTINUED | OUTPATIENT
Start: 2024-05-05 | End: 2024-05-06 | Stop reason: HOSPADM

## 2024-05-05 RX ORDER — SODIUM CHLORIDE 9 MG/ML
INJECTION, SOLUTION INTRAVENOUS CONTINUOUS
Status: ACTIVE | OUTPATIENT
Start: 2024-05-05 | End: 2024-05-05

## 2024-05-05 RX ORDER — HYDROCHLOROTHIAZIDE 12.5 MG/1
12.5 TABLET ORAL DAILY
Status: DISCONTINUED | OUTPATIENT
Start: 2024-05-05 | End: 2024-05-06 | Stop reason: HOSPADM

## 2024-05-05 RX ADMIN — ZINC SULFATE 220 MG (50 MG) CAPSULE 220 MG: CAPSULE at 09:08

## 2024-05-05 RX ADMIN — GUAIFENESIN AND DEXTROMETHORPHAN 10 ML: 100; 10 SYRUP ORAL at 09:14

## 2024-05-05 RX ADMIN — CEFTRIAXONE SODIUM 2 G: 2 INJECTION, POWDER, FOR SOLUTION INTRAMUSCULAR; INTRAVENOUS at 16:25

## 2024-05-05 RX ADMIN — GUAIFENESIN AND DEXTROMETHORPHAN 10 ML: 100; 10 SYRUP ORAL at 03:02

## 2024-05-05 RX ADMIN — METOPROLOL SUCCINATE 50 MG: 50 TABLET, EXTENDED RELEASE ORAL at 09:08

## 2024-05-05 RX ADMIN — AZITHROMYCIN MONOHYDRATE 500 MG: 500 INJECTION, POWDER, LYOPHILIZED, FOR SOLUTION INTRAVENOUS at 17:10

## 2024-05-05 RX ADMIN — SODIUM CHLORIDE: 9 INJECTION, SOLUTION INTRAVENOUS at 13:42

## 2024-05-05 RX ADMIN — ACETAMINOPHEN 650 MG: 325 TABLET, FILM COATED ORAL at 12:09

## 2024-05-05 RX ADMIN — GUAIFENESIN AND DEXTROMETHORPHAN 10 ML: 100; 10 SYRUP ORAL at 13:37

## 2024-05-05 RX ADMIN — ENOXAPARIN SODIUM 40 MG: 40 INJECTION SUBCUTANEOUS at 09:14

## 2024-05-05 RX ADMIN — PANTOPRAZOLE SODIUM 40 MG: 40 TABLET, DELAYED RELEASE ORAL at 16:24

## 2024-05-05 RX ADMIN — OXYCODONE HYDROCHLORIDE AND ACETAMINOPHEN 1000 MG: 500 TABLET ORAL at 09:08

## 2024-05-05 RX ADMIN — LISINOPRIL 20 MG: 20 TABLET ORAL at 09:08

## 2024-05-05 RX ADMIN — PANTOPRAZOLE SODIUM 40 MG: 40 TABLET, DELAYED RELEASE ORAL at 09:08

## 2024-05-05 RX ADMIN — GUAIFENESIN AND DEXTROMETHORPHAN 10 ML: 100; 10 SYRUP ORAL at 22:27

## 2024-05-05 RX ADMIN — ACETAMINOPHEN 650 MG: 325 TABLET, FILM COATED ORAL at 03:00

## 2024-05-05 RX ADMIN — CYCLOBENZAPRINE 5 MG: 5 TABLET, FILM COATED ORAL at 19:18

## 2024-05-05 RX ADMIN — MULTIVITAMIN TABLET 1 TABLET: TABLET at 09:08

## 2024-05-05 RX ADMIN — ASPIRIN 81 MG: 81 TABLET, COATED ORAL at 09:08

## 2024-05-05 ASSESSMENT — ACTIVITIES OF DAILY LIVING (ADL)
ADLS_ACUITY_SCORE: 20

## 2024-05-05 NOTE — PLAN OF CARE
Goal Outcome Evaluation:      Plan of Care Reviewed With: patient, spouse    Summary:  Influenza A, pneumonia   DATE & TIME: 5/5/24 [id number]  Cognitive Concerns/ Orientation : A&Ox4   BEHAVIOR & AGGRESSION TOOL COLOR: Green  ABNL VS/O2: VSS RA  MOBILITY: independent   PAIN MANAGMENT: T-pump ordered and in use for chronic back pain. Has new order for Flexural PRN for back pain at night  DIET: regular  BOWEL/BLADDER: continent  ABNL LAB/BG: Na 133  DRAIN/DEVICES: L PIV SL  TELEMETRY RHYTHM: N/A  SKIN: intact  TESTS/PROCEDURES: N/A  D/C DAY/GOALS/PLACE: Tomorrow 5/6  OTHER IMPORTANT INFO: hoarse cough

## 2024-05-05 NOTE — PLAN OF CARE
Goal Outcome Evaluation:         Summary:  Influenza A, pneumonia   DATE & TIME: 5/4/24-5/5/24 0343-8286  Cognitive Concerns/ Orientation : A&Ox4   BEHAVIOR & AGGRESSION TOOL COLOR: Green  ABNL VS/O2: VSS RA  MOBILITY: independent   PAIN MANAGMENT: T-pump ordered and in use for chronic back pain.  DIET: regular  BOWEL/BLADDER: continent  ABNL LAB/BG: Na 133  DRAIN/DEVICES: L PIV SL  TELEMETRY RHYTHM: N/A  SKIN: intact  TESTS/PROCEDURES: N/A  D/C DAY/GOALS/PLACE: Possibly today 5/5  OTHER IMPORTANT INFO: hoarse cough

## 2024-05-05 NOTE — PROGRESS NOTES
[facility]  Hospitalist Progress Note   05/05/2024          Assessment and Plan:       Mane Chawla is a 68 year old  male with medical history of hypertension, hyperlipidemia, psoriatic arthritis, GERD presented to ED with generalized weakness.     Patient reports viral symptoms including runny nose, cough with low-grade fever for about 7 days, seen in family medicine clinic on 5/1.  Reports tested positive for influenza A, recommended to manage with supportive care.     Multifocal pneumonia.  Influenza A infection.  -Presented with ongoing cough, shortness of breath, poor oral intake, little relief of symptoms with supportive care.  -At the time of admission no hypoxia, afebrile.  Bilateral coarse breath sounds.  WBC 9.8.  Lactic acid 0.8.  Procalcitonin 1.62.  .57.  Troponin high-sensitivity 9. EKG sinus rhythm, heart rate 79, QTc 424.  RSV, COVID-19 PCR negative.  Influenza A swab positive   Chest x-ray with patchy groundglass opacity and consolidation throughout both lungs suspicious for multifocal pneumonia.  Blood cultures no growth to date.  -- Continue IV ceftriaxone, IV azithromycin [5/3]. Switch to oral antibiotics likely time of discharge for total 10-day course.  Supportive care with Tylenol, Robitussin, lozenges  Trend fever curve.  Follow CRP.  Aggressive incentive spirometry, Acapella.  Oxygen as needed to keep O2 sats greater than 91%.  Follow-up imaging of chest in 6 to 8 weeks for resolution.     Diarrhea likely viral illness - improving   Reporting diarrhea multiple loose watery bowel movements over the last few days, symptoms improving.  Continue to monitor.  If ongoing diarrhea will send out stool for enteric pathogens     Hyponatremia likely from dehydration, diuretic use - improving   Anion gap metabolic acidosis.  Mild acute kidney injury likely prerenal  Sodium 128.Bicarb 13, anion gap 16.  Creatinine 1.19.   Received 3 L IV fluid and hydrochlorothiazide held with  which sodium improving, anion gap closed on 5/5.  Now having poor oral intake, this morning bicarb 15, anion gap 16.  Encourage oral intake, NS at 100 mill per hour for 10 hrs.  Continue to hold PTA hydrochlorothiazide.  Monitor renal function in AM.     Hypertension.  History of SVT.  Continue PTA metoprolol and lisinopril.  PTA hydrochlorothiazide 50 mg oral daily on hold since admission, continue to hold given poor intake.  Continue PTA aspirin.  As needed IV hydralazine ordered.    Acute anemia likely dilutional, acute illness.  Baseline hemoglobin around 13.  Presented with hemoglobin of 12.6, this morning dropped 11.7.  No active bleeding.  Monitor in AM.     GERD.  Lymphocytic esophagitis  Continue PTA PPI.     Psoriatic arthritis.  Chronic back pain status post laminectomy.  As needed Tylenol.  Continue PTA as needed Valium as needed for spasms.  Continue Flexeril 3 times daily as needed.  Avoid narcotics as able to.    Mild hypoalbuminemia from poor oral intake, acute illness.  Poor appetite from acute illness.  Dietary supplements with Ensure.  Oral intake as able to tolerate.     Physical deconditioning from medical illness.  Encourage ambulation as able to tolerate.     Orders Placed This Encounter      Combination Diet Regular Diet Adult      DVT Prophylaxis: SCDs, ambulate.  Subcutaneous Lovenox.  Code Status: Full Code  Disposition: Expected discharge 5/6.    Medically Ready for Discharge: Anticipated Tomorrow    Discussed with patient, bedside RN.  Patient's wife updated 5/4.  >35 minutes spent by me on the date of service doing chart review, history, exam, documentation & further activities per the note.     Sejal Diggs MD        Interval History:        Patient lying in bed.  Reports some improvement in shortness of breath.  Reports ongoing dry cough and hoarse voice.  Reports unable to sleep at night.  Complaining of generalized weakness.  Denies any abdominal pain.  Diarrhea improving.  No  vomiting.  Reports poor appetite and poor intake.  Denies any chest pain or palpitations.  Reports some shortness of breath with minimal ambulation.    Afebrile.  No hypoxia.  Reports ongoing back pain, chronic..  Has been using T pump overnight.         Physical Exam:        Physical Exam   Temp:  [97.8  F (36.6  C)-98.1  F (36.7  C)] 98  F (36.7  C)  Pulse:  [72-80] 75  Resp:  [18] 18  BP: (118-145)/(53-74) 143/72  SpO2:  [96 %-98 %] 98 %    Intake/Output Summary (Last 24 hours) at 5/4/2024 1313  Last data filed at 5/3/2024 1900  Gross per 24 hour   Intake 240 ml   Output --   Net 240 ml       Admission Weight: 81.6 kg (180 lb)  Current Weight: 81.5 kg (179 lb 10.8 oz)    PHYSICAL EXAM  GENERAL: Patient is in no distress.   HEART: Regular rate and rhythm. S1S2. No murmurs   LUNGS bilateral coarse breath sounds.   Respirations unlabored  NEURO: Moving all extremities.  EXTREMITIES: No pedal edema.   SKIN: Warm, dry. No rash  PSYCHIATRY Cooperative       Medications:        Current Facility-Administered Medications   Medication Dose Route Frequency Provider Last Rate Last Admin    aspirin EC tablet 81 mg  81 mg Oral Daily Sejal Diggs MD   81 mg at 05/05/24 0908    azithromycin (ZITHROMAX) 500 mg vial to attach to  mL bag  500 mg Intravenous Q24H Sejal Diggs MD   500 mg at 05/04/24 1733    cefTRIAXone (ROCEPHIN) 2 g vial to attach to  ml bag for ADULTS or NS 50 ml bag for PEDS  2 g Intravenous Q24H Sejal Diggs  mL/hr at 05/04/24 1650 2 g at 05/04/24 1650    enoxaparin ANTICOAGULANT (LOVENOX) injection 40 mg  40 mg Subcutaneous Q24H Sejal Diggs MD   40 mg at 05/05/24 0914    [Held by provider] hydroCHLOROthiazide tablet 12.5 mg  12.5 mg Oral Daily Sejal Diggs MD        lisinopril (ZESTRIL) tablet 20 mg  20 mg Oral Daily Sejal Diggs MD   20 mg at 05/05/24 0908    metoprolol succinate ER (TOPROL XL) 24 hr tablet 50 mg  50 mg Oral Daily Sejal Diggs MD    50 mg at 05/05/24 0908    multivitamin, therapeutic (THERA-VIT) tablet 1 tablet  1 tablet Oral Daily Sejal Diggs MD   1 tablet at 05/05/24 0908    pantoprazole (PROTONIX) EC tablet 40 mg  40 mg Oral BID AC Sejal Diggs MD   40 mg at 05/05/24 0908    sodium chloride (PF) 0.9% PF flush 3 mL  3 mL Intracatheter Q8H Sejal Diggs MD   3 mL at 05/05/24 0915    sodium chloride 0.9% BOLUS 1,000 mL  1,000 mL Intravenous Once Sejal Diggs MD   ED/Periop/Clinic Infusing on Admission/transfer at 05/03/24 1812    vitamin C (ASCORBIC ACID) tablet 1,000 mg  1,000 mg Oral Daily Sejal Diggs MD   1,000 mg at 05/05/24 0908    zinc sulfate (ZINCATE) capsule 220 mg  220 mg Oral Daily Sejal Diggs MD   220 mg at 05/05/24 0908     Current Facility-Administered Medications   Medication Dose Route Frequency Provider Last Rate Last Admin    acetaminophen (TYLENOL) tablet 650 mg  650 mg Oral Q4H PRN Sejal Diggs MD   650 mg at 05/05/24 1209    Or    acetaminophen (TYLENOL) Suppository 650 mg  650 mg Rectal Q4H PRN Sejal Diggs MD        benzocaine-menthol (CHLORASEPTIC) 6-10 MG lozenge 1 lozenge  1 lozenge Buccal Q1H PRN Sejal Diggs MD        calcium carbonate (TUMS) chewable tablet 1,000 mg  1,000 mg Oral 4x Daily PRN Sejal Diggs MD        cyclobenzaprine (FLEXERIL) tablet 5 mg  5 mg Oral Q8H PRN Sejal Diggs MD        diazepam (VALIUM) tablet 2 mg  2 mg Oral At Bedtime PRN Sejal Diggs MD        guaiFENesin-dextromethorphan (ROBITUSSIN DM) 100-10 MG/5ML syrup 10 mL  10 mL Oral Q4H PRN Sejal Diggs MD   10 mL at 05/05/24 0914    hydrALAZINE (APRESOLINE) injection 10 mg  10 mg Intravenous Q4H PRN Sejal Diggs MD        lidocaine (LMX4) cream   Topical Q1H PRN Sejal Diggs MD        lidocaine 1 % 0.1-1 mL  0.1-1 mL Other Q1H PRN Sejal Diggs MD        ondansetron (ZOFRAN ODT) ODT tab 4 mg  4 mg Oral Q6H PRN Sejal Diggs MD   4 mg at  05/04/24 1813    Or    ondansetron (ZOFRAN) injection 4 mg  4 mg Intravenous Q6H PRN Sejal Diggs MD   4 mg at 05/03/24 1723    senna-docusate (SENOKOT-S/PERICOLACE) 8.6-50 MG per tablet 1 tablet  1 tablet Oral BID PRSejal Cruz MD        Or    senna-docusate (SENOKOT-S/PERICOLACE) 8.6-50 MG per tablet 2 tablet  2 tablet Oral BID PRSejal Cruz MD        sodium chloride (PF) 0.9% PF flush 3 mL  3 mL Intracatheter q1 min prSejal Cruz MD                Data:      All new lab and imaging data was reviewed.

## 2024-05-06 VITALS
TEMPERATURE: 97.7 F | DIASTOLIC BLOOD PRESSURE: 75 MMHG | HEIGHT: 68 IN | HEART RATE: 63 BPM | RESPIRATION RATE: 18 BRPM | WEIGHT: 171.2 LBS | SYSTOLIC BLOOD PRESSURE: 137 MMHG | BODY MASS INDEX: 25.94 KG/M2 | OXYGEN SATURATION: 97 %

## 2024-05-06 LAB
ANION GAP SERPL CALCULATED.3IONS-SCNC: 12 MMOL/L (ref 7–15)
BUN SERPL-MCNC: 14 MG/DL (ref 8–23)
CALCIUM SERPL-MCNC: 8.6 MG/DL (ref 8.8–10.2)
CHLORIDE SERPL-SCNC: 103 MMOL/L (ref 98–107)
CREAT SERPL-MCNC: 1.02 MG/DL (ref 0.67–1.17)
CRP SERPL-MCNC: 65.93 MG/L
DEPRECATED HCO3 PLAS-SCNC: 17 MMOL/L (ref 22–29)
EGFRCR SERPLBLD CKD-EPI 2021: 79 ML/MIN/1.73M2
GLUCOSE SERPL-MCNC: 98 MG/DL (ref 70–99)
POTASSIUM SERPL-SCNC: 3.9 MMOL/L (ref 3.4–5.3)
SODIUM SERPL-SCNC: 132 MMOL/L (ref 135–145)

## 2024-05-06 PROCEDURE — 80048 BASIC METABOLIC PNL TOTAL CA: CPT | Performed by: HOSPITALIST

## 2024-05-06 PROCEDURE — 99239 HOSP IP/OBS DSCHRG MGMT >30: CPT | Performed by: HOSPITALIST

## 2024-05-06 PROCEDURE — 36415 COLL VENOUS BLD VENIPUNCTURE: CPT | Performed by: HOSPITALIST

## 2024-05-06 PROCEDURE — 250N000013 HC RX MED GY IP 250 OP 250 PS 637: Performed by: HOSPITALIST

## 2024-05-06 PROCEDURE — 86140 C-REACTIVE PROTEIN: CPT | Performed by: HOSPITALIST

## 2024-05-06 PROCEDURE — 250N000011 HC RX IP 250 OP 636: Performed by: HOSPITALIST

## 2024-05-06 RX ORDER — BENZONATATE 200 MG/1
200 CAPSULE ORAL 2 TIMES DAILY PRN
Qty: 20 CAPSULE | Refills: 0 | Status: SHIPPED | OUTPATIENT
Start: 2024-05-06 | End: 2024-08-05

## 2024-05-06 RX ORDER — HYDROCHLOROTHIAZIDE 50 MG/1
TABLET ORAL
COMMUNITY
Start: 2024-05-06 | End: 2024-06-24

## 2024-05-06 RX ORDER — ACETAMINOPHEN 325 MG/1
650 TABLET ORAL EVERY 8 HOURS PRN
COMMUNITY
Start: 2024-05-06

## 2024-05-06 RX ORDER — AZITHROMYCIN 250 MG/1
250 TABLET, FILM COATED ORAL DAILY
Qty: 2 TABLET | Refills: 0 | Status: SHIPPED | OUTPATIENT
Start: 2024-05-06 | End: 2024-05-08

## 2024-05-06 RX ADMIN — PANTOPRAZOLE SODIUM 40 MG: 40 TABLET, DELAYED RELEASE ORAL at 06:30

## 2024-05-06 RX ADMIN — LISINOPRIL 20 MG: 20 TABLET ORAL at 08:11

## 2024-05-06 RX ADMIN — METOPROLOL SUCCINATE 50 MG: 50 TABLET, EXTENDED RELEASE ORAL at 08:11

## 2024-05-06 RX ADMIN — MULTIVITAMIN TABLET 1 TABLET: TABLET at 08:11

## 2024-05-06 RX ADMIN — ACETAMINOPHEN 650 MG: 325 TABLET, FILM COATED ORAL at 00:55

## 2024-05-06 RX ADMIN — ZINC SULFATE 220 MG (50 MG) CAPSULE 220 MG: CAPSULE at 08:11

## 2024-05-06 RX ADMIN — ENOXAPARIN SODIUM 40 MG: 40 INJECTION SUBCUTANEOUS at 08:11

## 2024-05-06 RX ADMIN — ASPIRIN 81 MG: 81 TABLET, COATED ORAL at 08:11

## 2024-05-06 RX ADMIN — OXYCODONE HYDROCHLORIDE AND ACETAMINOPHEN 1000 MG: 500 TABLET ORAL at 08:11

## 2024-05-06 ASSESSMENT — ACTIVITIES OF DAILY LIVING (ADL)
ADLS_ACUITY_SCORE: 20

## 2024-05-06 NOTE — PLAN OF CARE
Summary:  Influenza A, pneumonia   DATE & TIME: 5/06/24 1737-7883  Cognitive Concerns/ Orientation : A&Ox4. Speech hoarse   BEHAVIOR & AGGRESSION TOOL COLOR: Green  ABNL VS/O2: VSS RA  MOBILITY: Independent   PAIN MANAGMENT:  PRN Tylenol x1 for back pain and headache  DIET: Regular  BOWEL/BLADDER: continent  ABNL LAB/BG: CRP 65.93; Na 132  DRAIN/DEVICES: L PIV saline locked   TELEMETRY RHYTHM: N/A  SKIN: intact  TESTS/PROCEDURES: N/A  D/C DAY/GOALS/PLACE: Anticipated discharge today.  OTHER IMPORTANT INFO: Infrequent non- productive cough. Encourage to use IS.  On IV Rocephin and Zithromax.

## 2024-05-06 NOTE — DISCHARGE SUMMARY
Discharge Summary  Hospitalist    Date of Admission:  5/3/2024  Date of Discharge:  5/6/2024  Discharging Provider: Sejal Diggs MD    Primary Care Physician   Filemon Magana  Primary Care Provider Phone Number: [phone]  Primary Care Provider Fax Number: [phone]    PRINCIPAL DIAGNOSIS  Multifocal pneumonia.  Influenza A infection.  Diarrhea likely viral illness - improved  Hyponatremia likely from dehydration, diuretic use - improving   Anion gap metabolic acidosis -improved.  Mild acute kidney injury likely prerenal -improved.  Acute anemia likely dilutional, acute illness.  Mild hypoalbuminemia from poor oral intake, acute illness.  Physical deconditioning from medical illness.    Past Medical History:   Diagnosis Date    ACP (advance care planning) 3-25-13    form given    GERD (gastroesophageal reflux disease)     HTN, goal below 140/80     Hypercholesterolemia     Hyperlipidemia LDL goal <160 6/15/2011    Psoriatic arthritis (H)        History of Present Illness   Mane Chawla is an 68 year old male who presented with     Hospital Course     Mane Chawla is a 68 year old  male with medical history of hypertension, hyperlipidemia, psoriatic arthritis, GERD presented to ED with generalized weakness.     Patient reports viral symptoms including runny nose, cough with low-grade fever for about 7 days, seen in family medicine clinic on 5/1.  Reports tested positive for influenza A, recommended to manage with supportive care.     Multifocal pneumonia.  Influenza A infection.  -Presented with ongoing cough, shortness of breath, poor oral intake, little relief of symptoms with supportive care.  -At the time of admission no hypoxia, afebrile.  Bilateral coarse breath sounds.  WBC 9.8.  Lactic acid 0.8.  Procalcitonin 1.62.  .57.  Troponin high-sensitivity 9. EKG sinus rhythm, heart rate 79, QTc 424.  RSV, COVID-19 PCR negative.  Influenza A swab positive   Chest x-ray with patchy  groundglass opacity and consolidation throughout both lungs suspicious for multifocal pneumonia.  Blood cultures no growth to date.  -- Was treated with supportive care, IV ceftriaxone, IV azithromycin [5/3].  Symptoms improving. Plan to switch to oral antibiotics on discharge.    -Switch to oral azithromycin on discharge to complete 5-day course.  Switch to oral Augmentin twice daily on discharge to complete 10-day course of antibiotics.  Supportive care with Tylenol, Tessalon Perles, lozenges  Aggressive incentive spirometry, Acapella.  Follow-up with PCP in 1 week.  Follow-up imaging of chest in 6 to 8 weeks for resolution.     Diarrhea likely viral illness - improved  Reporting diarrhea multiple loose watery bowel movements over the last few days, symptoms improved during hospital stay.     Hyponatremia likely from dehydration, diuretic use - improving   Anion gap metabolic acidosis -improved.  Mild acute kidney injury likely prerenal -improved.  Sodium 128.Bicarb 13, anion gap 16.  Creatinine 1.19.   Received IV fluids, hydrochlorothiazide held with which anion gap closed, sodium improving to 132.    Continue to hold PTA hydrochlorothiazide on discharge.  Adequate oral hydration, fluids around 2000 mL/day.  Monitor BMP in 7 days or earlier if symptomatic.     Hypertension.  History of SVT.  Hyperlipidemia.  Continue PTA metoprolol and lisinopril.  PTA hydrochlorothiazide 50 mg oral daily on hold since admission, continue to hold given poor intake, hyponatremia, systolic blood pressures mostly in 130s.  Continue PTA aspirin.  Monitor home blood pressure, heart rate review on provider visit and optimize therapy.  PTA not on statin therapy, defer age-appropriate health maintenance, consider statin therapy to PCP.     Acute anemia likely dilutional, acute illness.  Baseline hemoglobin around 13.    Presented with hemoglobin of 12.6, this morning dropped 11.6.  No active bleeding.  Received IV fluids, holding PTA  diuretic.  Monitor in 1 week or earlier if symptomatic.    Mild hypoalbuminemia from poor oral intake, acute illness.  Poor appetite from acute illness.  Oral intake as able to.  Can consider dietary supplements if needed.  Hold PTA hydrochlorothiazide until PCP evaluation.     Physical deconditioning from medical illness.  Encourage ambulation as able to tolerate.     GERD.  Lymphocytic esophagitis  Continue PTA PPI.     Psoriatic arthritis.  Chronic back pain status post laminectomy.  As needed Tylenol.  Continue PTA as needed Valium as needed for spasms.  Minimize use as able to.  Continue Flexeril 3 times daily as needed.  Avoid narcotics as able to.    Sejal Diggs MD.    Pending Results   These results will be followed up by hospitalist team.  Unresulted Labs Ordered in the Past 30 Days of this Admission       Date and Time Order Name Status Description    5/3/2024  3:46 PM Blood Culture Hand, Right Preliminary     5/3/2024  3:46 PM Blood Culture Hand, Right Preliminary                Physical Exam   Vitals:    05/03/24 1733 05/05/24 0500 05/06/24 0634   Weight: 81.5 kg (179 lb 10.8 oz) 79.5 kg (175 lb 3.2 oz) 77.7 kg (171 lb 3.2 oz)     Vital Signs with Ranges  Temp:  [97.7  F (36.5  C)-98.1  F (36.7  C)] 97.7  F (36.5  C)  Pulse:  [63-72] 63  Resp:  [18] 18  BP: (134-149)/(75-78) 137/75  SpO2:  [96 %-97 %] 97 %  I/O last 3 completed shifts:  In: 480 [P.O.:480]  Out: -   PHYSICAL EXAM  GENERAL: Patient is in no distress.   HEART: Regular rate and rhythm. S1S2. No murmurs   LUNGS bilateral decreased breath sounds.   Respirations unlabored  NEURO: Moving all extremities.  EXTREMITIES: No pedal edema.   SKIN: Warm, dry. No rash  PSYCHIATRY Cooperative  )Consultations This Hospital Stay   CARE MANAGEMENT / SOCIAL WORK IP CONSULT    Time Spent on this Encounter   Sejal REYES MD, personally saw the patient today and spent greater than 30 minutes discharging this patient. Discussed with patient, bedside  RN.    Discharge Disposition   Discharged to home  Condition at discharge: Stable    Discharge Orders      Reason for your hospital stay    You were admitted to the hospital with multifocal pneumonia, influenza A infection.  Treated with intravenous antibiotics, supportive care and symptoms improving.  Plan for discharge with close follow-up.     Activity    Your activity upon discharge: activity as tolerated and no driving for today     Monitor and record    Monitor daily blood pressure, heart rate review on provider visit and optimize therapy.     Discharge Instructions    Aggressive incentive spirometry, Acapella use.     Follow-up and recommended labs and tests     Follow up with primary care provider, Filemon Magana, within 7 days for hospital follow- up.  The following labs/tests are recommended: Monitor hemoglobin, BMP,CRP, albumin level in 1 week.  Follow-up imaging of chest in 6 to 8 weeks for resolution.  Consider statin therapy, Age-appropriate health maintenance on PCP visit.     Diet    Regular Diet Adult.  Adequate oral hydration.  [Oral fluid intake around 2000 ml per day recommended]       Discharge Medications   Current Discharge Medication List        START taking these medications    Details   acetaminophen (TYLENOL) 325 MG tablet Take 2 tablets (650 mg) by mouth every 8 hours as needed for fever or pain    Associated Diagnoses: Multifocal pneumonia      amoxicillin-clavulanate (AUGMENTIN) 875-125 MG tablet Take 1 tablet by mouth 2 times daily  Qty: 14 tablet, Refills: 0    Associated Diagnoses: Multifocal pneumonia      azithromycin (ZITHROMAX) 250 MG tablet Take 1 tablet (250 mg) by mouth daily for 2 days  Qty: 2 tablet, Refills: 0    Associated Diagnoses: Multifocal pneumonia      benzocaine-menthol (CHLORASEPTIC) 6-10 MG lozenge Place 1 lozenge inside cheek every hour as needed for sore throat (without fever)    Associated Diagnoses: Multifocal pneumonia      benzonatate (TESSALON) 200 MG  capsule Take 1 capsule (200 mg) by mouth 2 times daily as needed for cough  Qty: 20 capsule, Refills: 0    Associated Diagnoses: Multifocal pneumonia           CONTINUE these medications which have CHANGED    Details   hydrochlorothiazide (HYDRODIURIL) 50 MG tablet Hold until PCP Visit    Associated Diagnoses: Benign essential hypertension           CONTINUE these medications which have NOT CHANGED    Details   amoxicillin (AMOXIL) 500 MG capsule Before Dental Appts      aspirin (ASA) 81 MG EC tablet Take 1 tablet (81 mg) by mouth daily  Qty:      Associated Diagnoses: Atherosclerosis of aorta (H24)      CIMETIDINE ACID REDUCER PO Take 1 tablet by mouth daily as needed       diazepam (VALIUM) 5 MG tablet TAKE 1/2 TABLET BY MOUTH AT BEDTIME AS NEEDED FOR SEVERE MUSCLE SPASMS  Qty: 30 tablet, Refills: 1    Associated Diagnoses: Muscle spasm      esomeprazole (NEXIUM) 40 MG DR capsule Take 40 mg by mouth 2 times daily      fish oil-omega-3 fatty acids 1000 MG capsule Take 2 g by mouth daily      lisinopril (ZESTRIL) 20 MG tablet Take 1 tablet (20 mg) by mouth daily  Qty: 90 tablet, Refills: 3    Associated Diagnoses: Benign essential hypertension      metoprolol succinate ER (TOPROL XL) 50 MG 24 hr tablet TAKE 1 TABLET(50 MG) BY MOUTH DAILY  Qty: 90 tablet, Refills: 3    Associated Diagnoses: Benign essential hypertension      Multiple Vitamin (MULTI-VITAMINS) TABS Take 1 tablet by mouth      nystatin-triamcinolone (MYCOLOG II) 394627-7.1 UNIT/GM-% external cream Apply topically 2 times daily as needed (skin irritation)  Qty: 15 g, Refills: 4    Associated Diagnoses: Balanitis      tadalafil (CIALIS) 5 MG tablet Take 1 tablet every day by oral route.*      Turmeric 500 MG TABS Take by mouth daily       vitamin C (ASCORBIC ACID) 1000 MG TABS Take 1 tablet by mouth daily      Zinc Acetate, Oral, (GALZIN) 50 MG CAPS Take 50 mg by mouth daily      cyclobenzaprine (FLEXERIL) 5 MG tablet Take 1 tablet (5 mg) by mouth daily  "as needed for muscle spasms  Qty: 90 tablet, Refills: 1    Associated Diagnoses: Back muscle spasm           Allergies   Allergies   Allergen Reactions    Lisinopril Fatigue    Cipro [Quinolones] Other (See Comments)     Achy joints    Codeine Nausea     per h&p    Codeine Camsylate Nausea    Fluocinolone Other (See Comments)     \"achy joints\"    Statins Other (See Comments)     As provastatin- fatigue  On rovustatin  As of 10/1/14       DATA  Most Recent 3 CBC's:  Recent Labs   Lab Test 05/05/24  1429 05/05/24  1102 05/04/24  1151 05/03/24  1253 05/03/24  1253 03/01/22  0000   WBC  --   --  9.1  --  9.8 9.3   HGB 11.6* 11.7* 12.3*   < > 12.6* 13.3*   MCV  --   --  93  --  91 93.7   PLT  --   --  204  --  193 301    < > = values in this interval not displayed.      Most Recent 3 BMP's:  Recent Labs   Lab Test 05/06/24  0500 05/05/24  1102 05/04/24  1151   * 134* 133*   POTASSIUM 3.9 3.6 3.7   CHLORIDE 103 103 103   CO2 17* 15* 15*   BUN 14.0 14.9 15.8   CR 1.02 1.09 1.15   ANIONGAP 12 16* 15   LEIGHA 8.6* 8.9 8.6*   GLC 98 102* 124*     Most Recent 2 LFT's:  Recent Labs   Lab Test 05/03/24  1253 03/01/22  1159   AST 34 19   ALT 17 18   ALKPHOS 98 61   BILITOTAL 0.5 0.3       Most Recent TSH, T4 and A1c Labs:  Recent Labs   Lab Test 06/29/20  1400 07/21/16  1547   T4  --  7.0   A1C 5.0  --      Results for orders placed or performed during the hospital encounter of 05/03/24   XR Chest 2 Views    Narrative    XR CHEST 2 VIEWS   5/3/2024 1:09 PM     HISTORY: Shortness of breath, influenza A    COMPARISON: None.      Impression    IMPRESSION: Normal size of cardiomediastinal silhouette. Patchy ground  glass opacities and consolidation throughout both lungs, suspicious  for multifocal pneumonia, recommend radiographic follow-up to  resolution. No pleural effusion or pneumothorax. No acute bony  abnormality. ACDF hardware and right shoulder arthroplasty are noted.    HAIR CANELA MD         SYSTEM ID:  [id number]    "

## 2024-05-06 NOTE — PLAN OF CARE
Goal Outcome Evaluation:                  Summary:  Influenza A, pneumonia   DATE & TIME: 5/5/24, pm shift  Cognitive Concerns/ Orientation : A&Ox4. Speech hoarse at times.   BEHAVIOR & AGGRESSION TOOL COLOR: Green  ABNL VS/O2: VSS RA  MOBILITY: independent   PAIN MANAGMENT:  Gave PRN Flexeril for back pain.  DIET: regular  BOWEL/BLADDER: continent  ABNL LAB/BG: Na 134, .89  DRAIN/DEVICES: L PIV  infusing NS @ 100ml/hr. On iv Rocephin and Zithromax.  TELEMETRY RHYTHM: N/A  SKIN: intact  TESTS/PROCEDURES: N/A  D/C DAY/GOALS/PLACE: Tomorrow 5/6  OTHER IMPORTANT INFO: infrequent non productive cough. Gave PRN Robitussin. Encourage to use IS.

## 2024-05-06 NOTE — PLAN OF CARE
Goal Outcome Evaluation:       Discharge    Patient discharged to home   Care plan note: Patient alert/oriented X4, lungs mostly clear on RA, harsh NPC. Vss, denied pain. Up independently in room. Discharging to home, went over follow up/medications with no questions & able to read back.     Listed belongings gathered and given to patient (including from security/pharmacy). Yes  Care Plan and Patient education resolved: Yes  Prescriptions if needed, hard copies sent with patient  NA  Medication Bin checked and emptied on discharge Yes  SW/care coordinator/charge RN aware of discharge: Yes

## 2024-05-08 LAB
BACTERIA BLD CULT: NO GROWTH
BACTERIA BLD CULT: NO GROWTH

## 2024-05-13 ENCOUNTER — OFFICE VISIT (OUTPATIENT)
Dept: FAMILY MEDICINE | Facility: CLINIC | Age: 69
End: 2024-05-13

## 2024-05-13 VITALS
OXYGEN SATURATION: 98 % | DIASTOLIC BLOOD PRESSURE: 58 MMHG | TEMPERATURE: 98.6 F | BODY MASS INDEX: 26.4 KG/M2 | WEIGHT: 173.6 LBS | SYSTOLIC BLOOD PRESSURE: 102 MMHG | HEART RATE: 64 BPM

## 2024-05-13 DIAGNOSIS — R79.82 ELEVATED C-REACTIVE PROTEIN (CRP): ICD-10-CM

## 2024-05-13 DIAGNOSIS — D64.9 ANEMIA, UNSPECIFIED TYPE: ICD-10-CM

## 2024-05-13 DIAGNOSIS — J10.1 INFLUENZA A: ICD-10-CM

## 2024-05-13 DIAGNOSIS — I10 BENIGN ESSENTIAL HYPERTENSION: ICD-10-CM

## 2024-05-13 DIAGNOSIS — E87.1 HYPONATREMIA: ICD-10-CM

## 2024-05-13 DIAGNOSIS — J18.9 MULTIFOCAL PNEUMONIA: Primary | ICD-10-CM

## 2024-05-13 LAB
% GRANULOCYTES: 50.2 % (ref 42.2–75.2)
ANION GAP SERPL CALCULATED.3IONS-SCNC: 16 MMOL/L (ref 7–15)
BUN SERPL-MCNC: 20.9 MG/DL (ref 8–23)
CALCIUM SERPL-MCNC: 9 MG/DL (ref 8.8–10.2)
CHLORIDE SERPL-SCNC: 106 MMOL/L (ref 98–107)
CREAT SERPL-MCNC: 1.19 MG/DL (ref 0.67–1.17)
CRP SERPL-MCNC: <3 MG/L
DEPRECATED HCO3 PLAS-SCNC: 14 MMOL/L (ref 22–29)
EGFRCR SERPLBLD CKD-EPI 2021: 66 ML/MIN/1.73M2
GLUCOSE SERPL-MCNC: 117 MG/DL (ref 70–99)
HCT VFR BLD AUTO: 35.4 % (ref 39–51)
HEMOGLOBIN: 12.3 G/DL (ref 13.4–17.5)
LYMPHOCYTES NFR BLD AUTO: 41.9 % (ref 20.5–51.1)
MCH RBC QN AUTO: 31.5 PG (ref 27–31)
MCHC RBC AUTO-ENTMCNC: 34.9 G/DL (ref 33–37)
MCV RBC AUTO: 90.2 FL (ref 80–100)
MONOCYTES NFR BLD AUTO: 7.9 % (ref 1.7–9.3)
PLATELET # BLD AUTO: 403 K/UL (ref 140–450)
POTASSIUM SERPL-SCNC: 4.8 MMOL/L (ref 3.4–5.3)
RBC # BLD AUTO: 3.92 X10/CMM (ref 4.2–5.9)
SODIUM SERPL-SCNC: 136 MMOL/L (ref 135–145)
WBC # BLD AUTO: 12.4 X10/CMM (ref 3.8–11)

## 2024-05-13 PROCEDURE — 86140 C-REACTIVE PROTEIN: CPT | Mod: 90 | Performed by: FAMILY MEDICINE

## 2024-05-13 PROCEDURE — 80048 BASIC METABOLIC PNL TOTAL CA: CPT | Mod: 90 | Performed by: FAMILY MEDICINE

## 2024-05-13 PROCEDURE — 36415 COLL VENOUS BLD VENIPUNCTURE: CPT | Performed by: FAMILY MEDICINE

## 2024-05-13 PROCEDURE — 99496 TRANSJ CARE MGMT HIGH F2F 7D: CPT | Performed by: FAMILY MEDICINE

## 2024-05-13 PROCEDURE — 85025 COMPLETE CBC W/AUTO DIFF WBC: CPT | Performed by: FAMILY MEDICINE

## 2024-05-13 NOTE — PROGRESS NOTES
Hospital Follow-up Visit:    Hospital/Nursing Home/IP Rehab Facility: [facility]  Date of Admission: 5/3/24  Date of Discharge: 5/6/24  Reason(s) for Admission: Influenza A, pneumonia   Was the patient in the ICU or did the patient experience delirium during hospitalization?  No  Do you have any other stressors you would like to discuss with your provider? No    Problems taking medications regularly:  None  Medication changes since discharge: None  Problems adhering to non-medication therapy:  None    Summary of hospitalization:  [facility] discharge summary reviewed  Diagnostic Tests/Treatments reviewed.  Follow up needed: none  Other Healthcare Providers Involved in Patient s Care:         None  Update since discharge: stable.         Plan of care communicated with patient             Kristi Gilliam is a 68 year old patient who presents to clinic for hospital follow up.  He was admitted with influenza A, multifocal pneumonia and hyponatremia.  Treated with antibiotics and then transitioned to oral augmentin and azithromycin.  He improved but still feels very run down and tired.  States he has not been continuing to improve.  Denies severe cough.  No fever.  No chest pain.  He expected to feel better than he does.    CRP significantly elevated but was trending down    Sodium also low but did increase some.    BP low and hydrochlorothiazide held.        Review of Systems   Constitutional, HEENT, cardiovascular, pulmonary, GI, , musculoskeletal, neuro, skin, endocrine and psych systems are negative, except as otherwise noted.      Objective    /58   Pulse 64   Temp 98.6  F (37  C) (Oral)   Wt 78.7 kg (173 lb 9.6 oz)   SpO2 98%   BMI 26.40 kg/m      General: Well appearing, NAD  HEENT: Clear  Heart: RRR, no murmur  Chest: Right sided crackles.  Lungs otherwise clear.  No increased work of breathing  Skin: Clear  Psych: normal mood and  affect        Results for orders placed or performed in visit on 05/13/24 (from the past 24 hour(s))   CBC with Diff/Plt ([facility])   Result Value Ref Range    WBC x10/cmm 12.4 (A) 3.8 - 11.0 x10/cmm    % Lymphocytes 41.9 20.5 - 51.1 %    % Monocytes 7.9 1.7 - 9.3 %    % Granulocytes 50.2 42.2 - 75.2 %    RBC x10/cmm 3.92 (A) 4.2 - 5.9 x10/cmm    Hemoglobin 12.3 (A) 13.4 - 17.5 g/dl    Hematocrit 35.4 (A) 39 - 51 %    MCV 90.2 80 - 100 fL    MCH 31.5 (A) 27.0 - 31.0 pg    MCHC 34.9 33.0 - 37.0 g/dL    Platelet Count 403 140 - 450 K/uL       Multifocal pneumonia  Clinical plateau per patient.  WBC increased slightly but no left shift.  Await CRP.  Repeat CXR in 6 weeks, sooner if worse  - XR Chest 2 Views; Future    Anemia, unspecified type  Recheck, hgb improved  - CBC with Diff/Plt ([facility])  - VENOUS COLLECTION    Hyponatremia  recheck  - Basic metabolic panel; Future  - VENOUS COLLECTION  - Basic metabolic panel    Elevated C-reactive protein (CRP)  See above  - CRP inflammation; Future  - VENOUS COLLECTION  - CRP inflammation    Influenza A    Benign essential hypertension  Cont to hold hydrochlorothiazide for now given low BP

## 2024-05-14 ENCOUNTER — ANCILLARY PROCEDURE (OUTPATIENT)
Dept: GENERAL RADIOLOGY | Facility: CLINIC | Age: 69
End: 2024-05-14
Attending: FAMILY MEDICINE
Payer: COMMERCIAL

## 2024-05-14 PROCEDURE — 71046 X-RAY EXAM CHEST 2 VIEWS: CPT | Mod: TC | Performed by: STUDENT IN AN ORGANIZED HEALTH CARE EDUCATION/TRAINING PROGRAM

## 2024-05-31 ENCOUNTER — TELEPHONE (OUTPATIENT)
Dept: FAMILY MEDICINE | Facility: CLINIC | Age: 69
End: 2024-05-31

## 2024-05-31 DIAGNOSIS — J18.9 MULTIFOCAL PNEUMONIA: Primary | ICD-10-CM

## 2024-05-31 NOTE — TELEPHONE ENCOUNTER
----- Message from Aki Adhikari MD sent at 5/15/2024  1:48 PM CDT -----  Please call patient with results:    Some improving in airspace disease likely pneumonia.  However, he was not supposed to get the repeat CXR for 6 weeks so unsure why he did it yesterday.  Should repeat in 6 weeks    Aki Adhikari MD

## 2024-05-31 NOTE — TELEPHONE ENCOUNTER
Called and spoke with patient regarding chest Xray and pneumonia. Patient reports that he feels well and is 80% back to his baseline. Xray was repeated early and patient is advised we would like to repeat the xray the last week of June. Becky Williamson

## 2024-06-05 ENCOUNTER — TRANSFERRED RECORDS (OUTPATIENT)
Dept: FAMILY MEDICINE | Facility: CLINIC | Age: 69
End: 2024-06-05

## 2024-06-12 DIAGNOSIS — N48.1 BALANITIS: ICD-10-CM

## 2024-06-13 RX ORDER — NYSTATIN AND TRIAMCINOLONE ACETONIDE 100000; 1 [USP'U]/G; MG/G
CREAM TOPICAL
Qty: 15 G | Refills: 4 | Status: SHIPPED | OUTPATIENT
Start: 2024-06-13

## 2024-06-24 DIAGNOSIS — I10 BENIGN ESSENTIAL HYPERTENSION: ICD-10-CM

## 2024-06-24 RX ORDER — HYDROCHLOROTHIAZIDE 50 MG/1
50 TABLET ORAL DAILY
Qty: 90 TABLET | Refills: 1 | Status: SHIPPED | OUTPATIENT
Start: 2024-06-24

## 2024-06-24 NOTE — TELEPHONE ENCOUNTER
Patient calls requesting refill on HCTZ 50mg QD.     Last related visit: 5/13/24 post hospital visit with Dr. Adhikari. At that time patient had been holding HCTZ since 5/6/24 hospital discharge due to low BP's.     Had been hospitalized 5/3/24-5/6/24 for Influenza A, pneumonia.   Today patient reports restarted the med approx a week after visit with Dr. Adhikari because his BP's went back up. States monitors at home and reports lately it is a bit higher than usual with SBP's ~ 150 -- checks it when he gets home from work and job has been stressful. Takes med in morning.   Also takes lisinopril 20mg QD.   Discussed if SBP's continue to be consistently above 140, should RTC for visit regarding this. Patient agrees.     BP Readings from Last 6 Encounters:   05/13/24 102/58   05/06/24 137/75   05/01/24 113/47   01/04/24 (!) 148/80   12/21/23 134/78   11/28/23 (!) 141/82

## 2024-06-25 ENCOUNTER — ANCILLARY PROCEDURE (OUTPATIENT)
Dept: GENERAL RADIOLOGY | Facility: CLINIC | Age: 69
End: 2024-06-25
Attending: FAMILY MEDICINE
Payer: COMMERCIAL

## 2024-06-25 DIAGNOSIS — J18.9 MULTIFOCAL PNEUMONIA: ICD-10-CM

## 2024-06-25 PROCEDURE — 71046 X-RAY EXAM CHEST 2 VIEWS: CPT | Mod: TC | Performed by: RADIOLOGY

## 2024-07-31 NOTE — PATIENT INSTRUCTIONS

## 2024-07-31 NOTE — PROGRESS NOTES
Preoperative Evaluation  [facility]  [address] [place] [address]  Phone: [phone]  Fax: [phone]  Primary Provider: Filemon Magana MD  Pre-op Performing Provider: Filemon Magana MD  Aug 5, 2024               8/5/2024   Surgical Information   What procedure is being done? replace disc in neck   Facility or Hospital where procedure/surgery will be performed: [facility]   Who is doing the procedure / surgery? Rafael CHO Vinay   Date of surgery / procedure: 8/27/24   Time of surgery / procedure: TBD   Where do you plan to recover after surgery? at home with family      Fax number for surgical facility: [phone]    Assessment & Plan     The proposed surgical procedure is considered INTERMEDIATE risk.    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    Muscle spasm  Rare valium is very helpful         - No identified additional risk factors other than previously addressed    MEDICATION INSTRUCTIONS:  Take all scheduled medications on the day of surgery EXCEPT for modifications listed below:   - ACE/ARB: DO NOT TAKE on day of surgery (minimum 11 hours for general anesthesia).    Recommendation  Approval given to proceed with proposed procedure, without further diagnostic evaluation.    Kristi Mc is a 68 year old, presenting for the following:  Pre-Op Exam (8/27/24 disc in neck , [place] [facility]  )        HPI related to upcoming procedure: Neck disc disease now ready for intervention        8/5/2024   Pre-Op Questionnaire   Have you ever had a heart attack or stroke? No   Have you ever had surgery on your heart or blood vessels, such as a stent placement, a coronary artery bypass, or surgery on an artery in your head, neck, heart, or legs? No   Do you have chest pain with activity? No   Do you have a history of heart failure? No   Do you currently have a cold, bronchitis or symptoms of other infection? No   Do you have a cough, shortness of breath, or wheezing? No   Do you or anyone in your family have previous history of blood clots? No   Do you or does anyone in your family have a serious bleeding problem such as prolonged bleeding following surgeries or cuts? No   Have you ever had problems with anemia or been told to take iron pills? No   Have you had any abnormal blood loss such as black, tarry or bloody stools? No   Have you ever had a blood transfusion? No   Are you willing to have a blood transfusion if it is medically needed before, during, or after your surgery? Yes   Have you or any of your relatives ever had problems with anesthesia? No   Do you have sleep apnea, excessive snoring or daytime drowsiness? (!) YES   Do you have a CPAP machine? (!) NO    Do you have any artifical heart valves or other implanted medical devices like a pacemaker, defibrillator, or continuous glucose monitor? No   Do you have artificial joints? (!) YES right shoulder    Are you allergic to latex? No      Health Care Directive  Patient does not have a Health Care Directive or Living Will: As is reasonable care for most folks, In the short term, he wants usual aggressive medical care.   No desire for long term prolongation of life through artificial means if no hope to bring back to a reasonable status.      Preoperative Review of    reviewed - no record of controlled substances prescribed.      Status of Chronic  Conditions:  See problem list for active medical problems.  Problems all longstanding and stable, except as noted/documented.  See ROS for pertinent symptoms related to these conditions.    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 01/04/2024     Priority: Medium    Infliximab (Remicade) long-term use 01/04/2024     Priority: Medium    Previous consistent narcotic treatment with dependence, now off regular use, using nsaids and other modalities, but should be aware of dependence in remission 01/04/2024     Priority: Medium    SVT (supraventricular tachycardia) (H24) 09/07/2022     Priority: Medium    Primary osteoarthritis of right knee 05/14/2021     Priority: Medium    Atherosclerosis of aorta (H24) 01/26/2021     Priority: Medium    Nonrheumatic aortic sclerosis 10/10/2019     Priority: Medium    Spinal stenosis of lumbar region with neurogenic claudication 10/10/2019     Priority: Medium    Encounter for long-term (current) use of medications 03/13/2018     Priority: Medium    Benign essential hypertension 12/04/2017     Priority: Medium    ACP (advance care planning)      Priority: Medium    Hyperlipidemia LDL goal <160 06/15/2011     Priority: Medium    Psoriatic arthritis (H)      Priority: Medium      Past Medical History:   Diagnosis Date    ACP (advance care planning) 3-25-13    form given    GERD (gastroesophageal reflux disease)     HTN, goal below 140/80     Hypercholesterolemia     Hyperlipidemia LDL goal <160 6/15/2011    Psoriatic arthritis (H)      Past Surgical History:   Procedure Laterality Date    ARTHROSCOPY KNEE RT/LT      CARPAL TUNNEL RELEASE RT/LT      FUSION CERVICAL ANTERIOR THREE+ LEVELS      FUSION LUMBAR ANTERIOR ONE LEVEL      HC SHOULDER ARTHROSCOPY,PARTIAL DEBRIDEMENT Right 2023    Unable to due total shoulder  "   ROTATOR CUFF REPAIR RT/LT       Current Outpatient Medications   Medication Sig Dispense Refill    acetaminophen (TYLENOL) 325 MG tablet Take 2 tablets (650 mg) by mouth every 8 hours as needed for fever or pain      amoxicillin (AMOXIL) 500 MG capsule Before Dental Appts      aspirin (ASA) 81 MG EC tablet Take 1 tablet (81 mg) by mouth daily      CIMETIDINE ACID REDUCER PO Take 1 tablet by mouth daily as needed       diazepam (VALIUM) 5 MG tablet TAKE 1/2 TABLET BY MOUTH AT BEDTIME AS NEEDED FOR SEVERE MUSCLE SPASMS 30 tablet 1    esomeprazole (NEXIUM) 40 MG DR capsule Take 40 mg by mouth 2 times daily      fish oil-omega-3 fatty acids 1000 MG capsule Take 2 g by mouth daily      hydrochlorothiazide (HYDRODIURIL) 50 MG tablet Take 1 tablet (50 mg) by mouth daily 90 tablet 1    lisinopril (ZESTRIL) 20 MG tablet Take 1 tablet (20 mg) by mouth daily 90 tablet 3    metoprolol succinate ER (TOPROL XL) 50 MG 24 hr tablet TAKE 1 TABLET(50 MG) BY MOUTH DAILY 90 tablet 3    Multiple Vitamin (MULTI-VITAMINS) TABS Take 1 tablet by mouth      nystatin-triamcinolone (MYCOLOG II) 375236-7.1 UNIT/GM-% external cream APPLY TOPICALLY TWICE DAILY AS NEEDED FOR SKIN IRRITATION 15 g 4    tadalafil (CIALIS) 5 MG tablet Take 1 tablet every day by oral route.*      Turmeric 500 MG TABS Take by mouth daily       vitamin C (ASCORBIC ACID) 1000 MG TABS Take 1 tablet by mouth daily      Zinc Acetate, Oral, (GALZIN) 50 MG CAPS Take 50 mg by mouth daily         Allergies   Allergen Reactions    Lisinopril Fatigue    Cipro [Quinolones] Other (See Comments)     Achy joints    Codeine Nausea     per h&p    Codeine Camsylate Nausea    Fluocinolone Other (See Comments)     \"achy joints\"    Statins Other (See Comments)     As provastatin- fatigue  On rovustatin  As of 10/1/14        Social History     Tobacco Use    Smoking status: Former     Current packs/day: 0.00     Average packs/day: 1 pack/day for 30.0 years (30.0 ttl pk-yrs)     Types: " "Cigarettes     Start date: 3/21/1966     Quit date: 3/21/1996     Years since quittin.3    Smokeless tobacco: Never   Substance Use Topics    Alcohol use: Yes     Alcohol/week: 8.3 standard drinks of alcohol     Types: 10 Standard drinks or equivalent per week     Family History   Problem Relation Age of Onset    Respiratory Mother     Cerebrovascular Disease Father     Alzheimer Disease Father      History   Drug Use No             Review of Systems  Constitutional, HEENT, cardiovascular, pulmonary, GI, , musculoskeletal, neuro, skin, endocrine and psych systems are negative, except as otherwise noted.    Objective    BP (!) 144/75   Pulse 60   Wt 79.5 kg (175 lb 3.2 oz)   SpO2 99%   BMI 26.64 kg/m     Estimated body mass index is 26.64 kg/m  as calculated from the following:    Height as of 5/3/24: 1.727 m (5' 8\").    Weight as of this encounter: 79.5 kg (175 lb 3.2 oz).  Physical Exam  GENERAL: alert and no distress  EYES: Eyes grossly normal to inspection, PERRL and conjunctivae and sclerae normal  HENT: ear canals and TM's normal, nose and mouth without ulcers or lesions  NECK: no adenopathy, no asymmetry, masses, or scars  RESP: lungs clear to auscultation - no rales, rhonchi or wheezes  CV: regular rate and rhythm, normal S1 S2, no S3 or S4, no murmur, click or rub, no peripheral edema  ABDOMEN: soft, nontender, no hepatosplenomegaly, no masses and bowel sounds normal  MS: decreased ROM in the neck, lower back right knee, and psoriatic arthritis of hands.  NEURO: Normal strength and tone, mentation intact and speech normal  BACK: no CVA tenderness, no paralumbar tenderness  PSYCH: mentation appears normal, affect normal/bright    Recent Labs   Lab Test 24  1440 24  0000 24  0500 24  1429 24  1102 24  1151   HGB  --  12.3*  --  11.6*   < > 12.3*   PLT  --  403  --   --   --  204     --  132*  --    < > 133*   POTASSIUM 4.8  --  3.9  --    < > 3.7   CR 1.19*  " --  1.02  --    < > 1.15    < > = values in this interval not displayed.        Diagnostics  Labs pending at this time.  Results will be reviewed when available.   No EKG this visit, completed in the last 90 days.    Revised Cardiac Risk Index (RCRI)  The patient has the following serious cardiovascular risks for perioperative complications:   - No serious cardiac risks = 0 points     RCRI Interpretation: 0 points: Class I (very low risk - 0.4% complication rate)         Signed Electronically by: Filemon Magana MD  A copy of this evaluation report is provided to the requesting physician.

## 2024-08-05 ENCOUNTER — OFFICE VISIT (OUTPATIENT)
Dept: FAMILY MEDICINE | Facility: CLINIC | Age: 69
End: 2024-08-05

## 2024-08-05 VITALS
DIASTOLIC BLOOD PRESSURE: 75 MMHG | BODY MASS INDEX: 26.64 KG/M2 | HEART RATE: 60 BPM | SYSTOLIC BLOOD PRESSURE: 144 MMHG | WEIGHT: 175.2 LBS | OXYGEN SATURATION: 99 %

## 2024-08-05 DIAGNOSIS — Z79.899 IMMUNODEFICIENCY DUE TO DRUG THERAPY (H): ICD-10-CM

## 2024-08-05 DIAGNOSIS — D84.821 IMMUNODEFICIENCY DUE TO DRUG THERAPY (H): ICD-10-CM

## 2024-08-05 DIAGNOSIS — F11.21 NARCOTIC DEPENDENCE, IN REMISSION (H): ICD-10-CM

## 2024-08-05 DIAGNOSIS — L40.50 PSORIATIC ARTHRITIS (H): ICD-10-CM

## 2024-08-05 DIAGNOSIS — M17.11 PRIMARY OSTEOARTHRITIS OF RIGHT KNEE: ICD-10-CM

## 2024-08-05 DIAGNOSIS — M62.838 MUSCLE SPASM: ICD-10-CM

## 2024-08-05 DIAGNOSIS — Z01.818 PREOP GENERAL PHYSICAL EXAM: Primary | ICD-10-CM

## 2024-08-05 DIAGNOSIS — G95.89 MYELOMALACIA OF CERVICAL CORD (H): ICD-10-CM

## 2024-08-05 DIAGNOSIS — M48.062 SPINAL STENOSIS OF LUMBAR REGION WITH NEUROGENIC CLAUDICATION: ICD-10-CM

## 2024-08-05 PROBLEM — R19.7 DIARRHEA, UNSPECIFIED TYPE: Status: RESOLVED | Noted: 2024-05-03 | Resolved: 2024-08-05

## 2024-08-05 PROBLEM — J18.9 MULTIFOCAL PNEUMONIA: Status: RESOLVED | Noted: 2024-05-03 | Resolved: 2024-08-05

## 2024-08-05 PROBLEM — F11.20 CONTINUOUS OPIOID DEPENDENCE (H): Status: RESOLVED | Noted: 2023-05-10 | Resolved: 2024-08-05

## 2024-08-05 PROBLEM — J10.1 INFLUENZA A: Status: RESOLVED | Noted: 2024-05-03 | Resolved: 2024-08-05

## 2024-08-05 PROCEDURE — G2211 COMPLEX E/M VISIT ADD ON: HCPCS | Performed by: FAMILY MEDICINE

## 2024-08-05 PROCEDURE — 99214 OFFICE O/P EST MOD 30 MIN: CPT | Performed by: FAMILY MEDICINE

## 2024-08-05 RX ORDER — DIAZEPAM 5 MG
TABLET ORAL
Qty: 30 TABLET | Refills: 1 | Status: SHIPPED | OUTPATIENT
Start: 2024-08-05 | End: 2024-09-10

## 2024-08-08 NOTE — PROGRESS NOTES
8/7/24 faxed preop, EKG and chest xray to [facility] @ [phone]    Nacho Palacios,   [facility]  [phone]       Quality 130: Documentation Of Current Medications In The Medical Record: Current Medications Documented Quality 431: Preventive Care And Screening: Unhealthy Alcohol Use - Screening: Patient not identified as an unhealthy alcohol user when screened for unhealthy alcohol use using a systematic screening method Detail Level: Detailed Quality 226: Preventive Care And Screening: Tobacco Use: Screening And Cessation Intervention: Patient screened for tobacco use and is an ex/non-smoker

## 2024-09-10 DIAGNOSIS — M62.838 MUSCLE SPASM: ICD-10-CM

## 2024-09-10 RX ORDER — DIAZEPAM 5 MG
TABLET ORAL
Qty: 30 TABLET | Refills: 2 | Status: SHIPPED | OUTPATIENT
Start: 2024-09-10

## 2024-09-13 ENCOUNTER — TELEPHONE (OUTPATIENT)
Dept: FAMILY MEDICINE | Facility: CLINIC | Age: 69
End: 2024-09-13

## 2024-09-13 NOTE — TELEPHONE ENCOUNTER
Prior authorization done via CoverMyMeds for diazepam. Prior authorization approved with approval dates 9/11/24-9/10/25. Becky Williamson

## 2024-11-15 DIAGNOSIS — I10 BENIGN ESSENTIAL HYPERTENSION: ICD-10-CM

## 2024-11-15 RX ORDER — METOPROLOL SUCCINATE 50 MG/1
50 TABLET, EXTENDED RELEASE ORAL DAILY
Qty: 90 TABLET | Refills: 3 | Status: SHIPPED | OUTPATIENT
Start: 2024-11-15

## 2024-11-15 NOTE — PATIENT INSTRUCTIONS
How to Take Your Medication Before Surgery  Preoperative Medication Instructions   Antiplatelet or Anticoagulation Medication Instructions   - Patient is on no antiplatelet or anticoagulation medications.    Additional Medication Instructions  Take all scheduled medications on the day of surgery EXCEPT for modifications listed below:   - ACE/ARB: DO NOT TAKE on day of surgery (minimum 11 hours for general anesthesia).       Patient Education   Preparing for Your Surgery  For Adults  Getting started  In most cases, a nurse will call to review your health history and instructions. They will give you an arrival time based on your scheduled surgery time. Please be ready to share:  Your doctor's clinic name and phone number  Your medical, surgical, and anesthesia history  A list of allergies and sensitivities  A list of medicines, including herbal treatments and over-the-counter drugs  Whether the patient has a legal guardian (ask how to send us the papers in advance)  Note: You may not receive a call if you were seen at our PAC (Preoperative Assessment Center).  Please tell us if you're pregnant--or if there's any chance you might be pregnant. Some surgeries may injure a fetus (unborn baby), so they require a pregnancy test. Surgeries that are safe for a fetus don't always need a test, and you can choose whether to have one.   Preparing for surgery  Within 10 to 30 days of surgery: Have a pre-op exam (sometimes called an H&P, or History and Physical). This can be done at a clinic or pre-operative center.  If you're having a , you may not need this exam. Talk to your care team.  At your pre-op exam, talk to your care team about all medicines you take. (This includes CBD oil and any drugs, such as THC, marijuana, and other forms of cannabis.) If you need to stop any medicine before surgery, ask when to start taking it again.  This is for your safety. Many medicines and drugs can make you bleed too much during  surgery. Some change how well surgery (anesthesia) drugs work.  Call your insurance company to let them know you're having surgery. (If you don't have insurance, call [phone].)  Call your clinic if there's any change in your health. This includes a scrape or scratch near the surgery site, or any signs of a cold (sore throat, runny nose, cough, rash, fever).  Eating and drinking guidelines  For your safety: Unless your surgeon tells you otherwise, follow the guidelines below.  Eat and drink as normal until 8 hours before you arrive for surgery. After that, no food or milk. You can spit out gum when you arrive.  Drink clear liquids until 2 hours before you arrive. These are liquids you can see through, like water, Gatorade, and Propel Water. They also include plain black coffee and tea (no cream or milk).  No alcohol for 24 hours before you arrive. The night before surgery, stop any drinks that contain THC.  If your care team tells you to take medicine on the morning of surgery, it's okay to take it with a sip of water. No other medicines or drugs are allowed (including CBD oil)--follow your care team's instructions.  If you have questions the day of surgery, call your hospital or surgery center.   Preventing infection  Shower or bathe the night before and the morning of surgery. Follow the instructions your clinic gave you. (If no instructions, use regular soap.)  Don't shave or clip hair near your surgery site. We'll remove the hair if needed.  Don't smoke or vape the morning of surgery. No chewing tobacco for 6 hours before you arrive. A nicotine patch is okay. You may spit out nicotine gum when you arrive.  For some surgeries, the surgeon will tell you to fully quit smoking and nicotine.  We will make every effort to keep you safe from infection. We will:  Clean our hands often with soap and water (or an alcohol-based hand rub).  Clean the skin at your surgery site with a special soap that kills germs.  Give  you a special gown to keep you warm. (Cold raises the risk of infection.)  Wear hair covers, masks, gowns, and gloves during surgery.  Give antibiotic medicine, if prescribed. Not all surgeries need this medicine.  What to bring on the day of surgery  Photo ID and insurance card  Copy of your health care directive, if you have one  Glasses and hearing aids (bring cases)  You can't wear contacts during surgery  Inhaler and eye drops, if you use them (tell us about these when you arrive)  CPAP machine or breathing device, if you use them  A few personal items, if spending the night  If you have . . .  A pacemaker, ICD (cardiac defibrillator), or other implant: Bring the ID card.  An implanted stimulator: Bring the remote control.  A legal guardian: Bring a copy of the certified (court-stamped) guardianship papers.  Please remove any jewelry, including body piercings. Leave jewelry and other valuables at home.  If you're going home the day of surgery  You must have a responsible adult drive you home. They should stay with you overnight as well.  If you don't have someone to stay with you, and you aren't safe to go home alone, we may keep you overnight. Insurance often won't pay for this.  After surgery  If it's hard to control your pain or you need more pain medicine, please call your surgeon's office.  Questions?   If you have any questions for your care team, list them here:   ____________________________________________________________________________________________________________________________________________________________________________________________________________________________________________________________  For informational purposes only. Not to replace the advice of your health care provider. Copyright   2003, 2019 [facility]. All rights reserved. Clinically reviewed by Stuart Salmon MD. [employer] [id number] - REV 08/24.

## 2024-11-15 NOTE — TELEPHONE ENCOUNTER
Med: Metoprolol    LOV (related): 5/13/24    Last Lab: 5/13/24      Due for F/U around: 12/21/24 CPX    Next Appt: 11/18/24 Pre op        BP Readings from Last 3 Encounters:   08/05/24 (!) 144/75   05/13/24 102/58   05/06/24 137/75       Last Comprehensive Metabolic Panel:  Lab Results   Component Value Date     05/13/2024    POTASSIUM 4.8 05/13/2024    CHLORIDE 106 05/13/2024    CO2 14 (L) 05/13/2024    ANIONGAP 16 (H) 05/13/2024     (H) 05/13/2024    BUN 20.9 05/13/2024    CR 1.19 (H) 05/13/2024    GFRESTIMATED 66 05/13/2024    LEIGHA 9.0 05/13/2024

## 2024-11-18 ENCOUNTER — OFFICE VISIT (OUTPATIENT)
Dept: FAMILY MEDICINE | Facility: CLINIC | Age: 69
End: 2024-11-18

## 2024-11-18 VITALS
SYSTOLIC BLOOD PRESSURE: 142 MMHG | DIASTOLIC BLOOD PRESSURE: 79 MMHG | OXYGEN SATURATION: 99 % | HEART RATE: 67 BPM | BODY MASS INDEX: 28.19 KG/M2 | WEIGHT: 185.4 LBS

## 2024-11-18 DIAGNOSIS — Z23 NEED FOR VACCINATION: ICD-10-CM

## 2024-11-18 DIAGNOSIS — I70.0 ATHEROSCLEROSIS OF AORTA (H): ICD-10-CM

## 2024-11-18 DIAGNOSIS — M17.11 PRIMARY OSTEOARTHRITIS OF RIGHT KNEE: ICD-10-CM

## 2024-11-18 DIAGNOSIS — I10 BENIGN ESSENTIAL HYPERTENSION: ICD-10-CM

## 2024-11-18 DIAGNOSIS — I47.10 SVT (SUPRAVENTRICULAR TACHYCARDIA) (H): ICD-10-CM

## 2024-11-18 DIAGNOSIS — Z01.818 PREOP GENERAL PHYSICAL EXAM: Primary | ICD-10-CM

## 2024-11-18 LAB
% GRANULOCYTES: 62.5 % (ref 42.2–75.2)
HCT VFR BLD AUTO: 36.5 % (ref 39–51)
HEMOGLOBIN: 12.8 G/DL (ref 13.4–17.5)
LYMPHOCYTES NFR BLD AUTO: 29.9 % (ref 20.5–51.1)
MCH RBC QN AUTO: 32.5 PG (ref 27–31)
MCHC RBC AUTO-ENTMCNC: 35.2 G/DL (ref 33–37)
MCV RBC AUTO: 92.4 FL (ref 80–100)
MONOCYTES NFR BLD AUTO: 7.6 % (ref 1.7–9.3)
PLATELET # BLD AUTO: 260 K/UL (ref 140–450)
RBC # BLD AUTO: 3.95 X10/CMM (ref 4.2–5.9)
WBC # BLD AUTO: 8.1 X10/CMM (ref 3.8–11)

## 2024-11-18 PROCEDURE — 91320 SARSCV2 VAC 30MCG TRS-SUC IM: CPT | Performed by: FAMILY MEDICINE

## 2024-11-18 PROCEDURE — 85025 COMPLETE CBC W/AUTO DIFF WBC: CPT | Performed by: FAMILY MEDICINE

## 2024-11-18 PROCEDURE — G0008 ADMIN INFLUENZA VIRUS VAC: HCPCS | Performed by: FAMILY MEDICINE

## 2024-11-18 PROCEDURE — 99214 OFFICE O/P EST MOD 30 MIN: CPT | Mod: 25 | Performed by: FAMILY MEDICINE

## 2024-11-18 PROCEDURE — 90653 IIV ADJUVANT VACCINE IM: CPT | Performed by: FAMILY MEDICINE

## 2024-11-18 PROCEDURE — 36415 COLL VENOUS BLD VENIPUNCTURE: CPT | Performed by: FAMILY MEDICINE

## 2024-11-18 PROCEDURE — 93000 ELECTROCARDIOGRAM COMPLETE: CPT | Performed by: FAMILY MEDICINE

## 2024-11-18 PROCEDURE — 80061 LIPID PANEL: CPT | Mod: QW | Performed by: FAMILY MEDICINE

## 2024-11-18 PROCEDURE — 90480 ADMN SARSCOV2 VAC 1/ONLY CMP: CPT | Performed by: FAMILY MEDICINE

## 2024-11-18 ASSESSMENT — ANXIETY QUESTIONNAIRES
5. BEING SO RESTLESS THAT IT IS HARD TO SIT STILL: NOT AT ALL
GAD7 TOTAL SCORE: 1
1. FEELING NERVOUS, ANXIOUS, OR ON EDGE: NOT AT ALL
3. WORRYING TOO MUCH ABOUT DIFFERENT THINGS: NOT AT ALL
IF YOU CHECKED OFF ANY PROBLEMS ON THIS QUESTIONNAIRE, HOW DIFFICULT HAVE THESE PROBLEMS MADE IT FOR YOU TO DO YOUR WORK, TAKE CARE OF THINGS AT HOME, OR GET ALONG WITH OTHER PEOPLE: NOT DIFFICULT AT ALL
7. FEELING AFRAID AS IF SOMETHING AWFUL MIGHT HAPPEN: NOT AT ALL
GAD7 TOTAL SCORE: 1
6. BECOMING EASILY ANNOYED OR IRRITABLE: SEVERAL DAYS
2. NOT BEING ABLE TO STOP OR CONTROL WORRYING: NOT AT ALL

## 2024-11-18 ASSESSMENT — PATIENT HEALTH QUESTIONNAIRE - PHQ9
SUM OF ALL RESPONSES TO PHQ QUESTIONS 1-9: 4
5. POOR APPETITE OR OVEREATING: NOT AT ALL

## 2024-11-18 NOTE — PROGRESS NOTES
Preoperative Evaluation  [facility]  [address] [place] [address]  Phone: [phone]  Fax: [phone]  Primary Provider: Filemon Magana MD  Pre-op Performing Provider: Filemon Magana MD  Nov 18, 2024 11/18/2024   Surgical Information   What procedure is being done? Right Knee replacment    Facility or Hospital where procedure/surgery [facility] be performed: [facility]    Who is doing the procedure / surgery? Ghulam Dowell MD    Date of surgery / procedure: 12/2/24    Time of surgery / procedure: 10:00am    Where do you plan to recover after surgery? at home with family        Patient-reported     Fax number for surgical facility: Note does not need to be faxed, will be available electronically in Epic. JUST IN CASE -[phone]    Assessment & Plan     The proposed surgical procedure is considered LOW risk.    Preop general physical exam  Prior to repair of   Primary osteoarthritis of right knee      Benign essential hypertension  Well controlled on current meds.  - CBC with Diff/Plt ([facility])    Atherosclerosis of aorta (H)  On statin and controlling HTN  - Lipid Profile  - Lipid Profile    SVT (supraventricular tachycardia) (H)  No recent palpitations.  - CBC with Diff/Plt ([facility])  - EKG 12-lead complete w/read - Clinics    Need for vaccination    - COVID-19 12+ (PFIZER)  - INFLUENZA VACCINE, TRIVALENT 65+ (FLUAD)         The longitudinal plan of care for the diagnosis(es)/condition(s) as documented were addressed during this visit. Due to the added complexity in care, I will continue to support Alexandro in the subsequent management and with ongoing continuity of care.     - No identified additional risk factors other than previously addressed    MEDICATION INSTRUCTIONS:  Take all scheduled medications on the day of surgery EXCEPT for modifications listed below:  Lisinopril    Recommendation  Approval given to proceed with proposed procedure, without  further diagnostic evaluation.    Kristi Mc is a 68 year old, presenting for the following:  Pre-Op Exam (Right knee replacement, Delmer lacey Bishoy Victor Danial, MD 12/2/24)        HPI related to upcoming procedure: Here prior to TKA for Oa of knee endstage.        11/18/2024   Pre-Op Questionnaire   Have you ever had a heart attack or stroke? No    Have you ever had surgery on your heart or blood vessels, such as a stent placement, a coronary artery bypass, or surgery on an artery in your head, neck, heart, or legs? No    Do you have chest pain with activity? No    Do you have a history of heart failure? No    Do you currently have a cold, bronchitis or symptoms of other infection? No    Do you have a cough, shortness of breath, or wheezing? No    Do you or anyone in your family have previous history of blood clots? No    Do you or does anyone in your family have a serious bleeding problem such as prolonged bleeding following surgeries or cuts? No    Have you ever had problems with anemia or been told to take iron pills? No    Have you had any abnormal blood loss such as black, tarry or bloody stools? No    Have you ever had a blood transfusion? No    Are you willing to have a blood transfusion if it is medically needed before, during, or after your surgery? Yes    Have you or any of your relatives ever had problems with anesthesia? No    Do you have sleep apnea, excessive snoring or daytime drowsiness? No    Do you have any artifical heart valves or other implanted medical devices like a pacemaker, defibrillator, or continuous glucose monitor? No    Do you have artificial joints? (!) YES  Right shoulder, and has pins in neck and lower back    Are you allergic to latex? No        Patient-reported     Health Care Directive  Patient does not have a Health Care Directive:     Preoperative Review of         Status of Chronic Conditions:  See problem list for active medical problems.  Problems all  longstanding and stable, except as noted/documented.  See ROS for pertinent symptoms related to these conditions.    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 01/04/2024     Priority: Medium    Infliximab (Remicade) long-term use 01/04/2024     Priority: Medium    Previous consistent narcotic treatment with dependence, now off regular use, using nsaids and other modalities, but should be aware of dependence in remission 01/04/2024     Priority: Medium    SVT (supraventricular tachycardia) (H) 09/07/2022     Priority: Medium    Primary osteoarthritis of right knee 05/14/2021     Priority: Medium    Atherosclerosis of aorta (H) 01/26/2021     Priority: Medium    Nonrheumatic aortic sclerosis 10/10/2019     Priority: Medium    Spinal stenosis of lumbar region with neurogenic claudication 10/10/2019     Priority: Medium    Encounter for long-term (current) use of medications 03/13/2018     Priority: Medium    Benign essential hypertension 12/04/2017     Priority: Medium    ACP (advance care planning)      Priority: Medium    Hyperlipidemia LDL goal <160 06/15/2011     Priority: Medium    Psoriatic arthritis (H)      Priority: Medium      Past Medical History:   Diagnosis Date    ACP (advance care planning) 3-25-13    form given    GERD (gastroesophageal reflux disease)     HTN, goal below 140/80     Hypercholesterolemia     Hyperlipidemia LDL goal <160 6/15/2011    Psoriatic arthritis (H)      Past Surgical History:   Procedure Laterality Date    ARTHROSCOPY KNEE RT/LT      CARPAL TUNNEL RELEASE RT/LT      FUSION CERVICAL ANTERIOR THREE+ LEVELS      twice    FUSION LUMBAR ANTERIOR ONE LEVEL      twice    HC SHOULDER ARTHROSCOPY,PARTIAL DEBRIDEMENT Right 2023    Unable to due total shoulder    ROTATOR CUFF REPAIR RT/LT Right     3 total surgeries  "prior to Total shoulder     Current Outpatient Medications   Medication Sig Dispense Refill    acetaminophen (TYLENOL) 325 MG tablet Take 2 tablets (650 mg) by mouth every 8 hours as needed for fever or pain      amoxicillin (AMOXIL) 500 MG capsule Before Dental Appts      aspirin (ASA) 81 MG EC tablet Take 1 tablet (81 mg) by mouth daily      CIMETIDINE ACID REDUCER PO Take 1 tablet by mouth daily as needed       diazepam (VALIUM) 5 MG tablet TAKE 1/2 TABLET BY MOUTH AT BEDTIME AS NEEDED FOR SEVERE MUSCLE SPASMS 30 tablet 2    esomeprazole (NEXIUM) 40 MG DR capsule Take 40 mg by mouth 2 times daily      fish oil-omega-3 fatty acids 1000 MG capsule Take 2 g by mouth daily      hydrochlorothiazide (HYDRODIURIL) 50 MG tablet Take 1 tablet (50 mg) by mouth daily 90 tablet 1    lisinopril (ZESTRIL) 20 MG tablet Take 1 tablet (20 mg) by mouth daily 90 tablet 3    metoprolol succinate ER (TOPROL XL) 50 MG 24 hr tablet TAKE 1 TABLET(50 MG) BY MOUTH DAILY 90 tablet 3    Multiple Vitamin (MULTI-VITAMINS) TABS Take 1 tablet by mouth      nystatin-triamcinolone (MYCOLOG II) 192346-8.1 UNIT/GM-% external cream APPLY TOPICALLY TWICE DAILY AS NEEDED FOR SKIN IRRITATION 15 g 4    tadalafil (CIALIS) 5 MG tablet Take 1 tablet every day by oral route.*      Turmeric 500 MG TABS Take by mouth daily       vitamin C (ASCORBIC ACID) 1000 MG TABS Take 1 tablet by mouth daily      Zinc Acetate, Oral, (GALZIN) 50 MG CAPS Take 50 mg by mouth daily         Allergies   Allergen Reactions    Lisinopril Fatigue    Cipro [Quinolones] Other (See Comments)     Achy joints    Codeine Nausea     per h&p    Codeine Camsylate Nausea    Fluocinolone Other (See Comments)     \"achy joints\"    Statins Other (See Comments)     As provastatin- fatigue  On rovustatin  As of 10/1/14        Social History     Tobacco Use    Smoking status: Former     Current packs/day: 0.00     Average packs/day: 1 pack/day for 30.0 years (30.0 ttl pk-yrs)     Types: Cigarettes " "    Start date: 3/21/1966     Quit date: 3/21/1996     Years since quittin.6    Smokeless tobacco: Never   Substance Use Topics    Alcohol use: Yes     Alcohol/week: 14.0 standard drinks of alcohol     Types: 14 Cans of beer per week     Family History   Problem Relation Age of Onset    Respiratory Mother     Cerebrovascular Disease Father     Alzheimer Disease Father     No Known Problems Brother     No Known Problems Sister     No Known Problems Son     No Known Problems Daughter     No Known Problems Daughter      History   Drug Use No             Review of Systems  Constitutional, HEENT, cardiovascular, pulmonary, GI, , musculoskeletal, neuro, skin, endocrine and psych systems are negative, except as otherwise noted.    Objective    BP (!) 142/79   Pulse 67   Wt 84.1 kg (185 lb 6.4 oz)   SpO2 99%   BMI 28.19 kg/m     Estimated body mass index is 28.19 kg/m  as calculated from the following:    Height as of 5/3/24: 1.727 m (5' 8\").    Weight as of this encounter: 84.1 kg (185 lb 6.4 oz).  Physical Exam  GENERAL: alert and no distress  EYES: Eyes grossly normal to inspection, PERRL and conjunctivae and sclerae normal  HENT: ear canals and TM's normal, nose and mouth without ulcers or lesions  NECK: no adenopathy, no asymmetry, masses, or scars  RESP: lungs clear to auscultation - no rales, rhonchi or wheezes  CV: regular rate and rhythm, normal S1 S2, no S3 or S4, no murmur, click or rub, no peripheral edema  ABDOMEN: soft, nontender, no hepatosplenomegaly, no masses and bowel sounds normal  MS: decreased range of motion in the knees  SKIN: no suspicious lesions or rashes  NEURO: Normal strength and tone, mentation intact and speech normal  PSYCH: mentation appears normal, affect normal/bright    Recent Labs   Lab Test 24  1440 24  0000 24  0500 24  1429 24  1102 24  1151   HGB  --  12.3*  --  11.6*   < > 12.3*   PLT  --  403  --   --   --  204     --  132*  --  "   < > 133*   POTASSIUM 4.8  --  3.9  --    < > 3.7   CR 1.19*  --  1.02  --    < > 1.15    < > = values in this interval not displayed.        Diagnostics  Labs pending at this time.  Results will be reviewed when available.   EKG: sinus bradycardia, normal axis, normal intervals, no acute ST/T changes c/w ischemia, no LVH by voltage criteria, unchanged from previous tracings    Revised Cardiac Risk Index (RCRI)  The patient has the following serious cardiovascular risks for perioperative complications:   - No serious cardiac risks = 0 points     RCRI Interpretation: 0 points: Class I (very low risk - 0.4% complication rate)         Signed Electronically by: Filemon Magana MD  A copy of this evaluation report is provided to the requesting physician.

## 2024-11-19 LAB
CHOLEST SERPL-MCNC: 290 MG/DL
FASTING STATUS PATIENT QL REPORTED: NO
HDLC SERPL-MCNC: 39 MG/DL
LDLC SERPL CALC-MCNC: ABNORMAL MG/DL
NONHDLC SERPL-MCNC: 251 MG/DL
TRIGL SERPL-MCNC: 450 MG/DL

## 2024-11-19 NOTE — PROGRESS NOTES
11/18/24 Faxed preop and EKG to [facility] @ [phone]    Ncaho Palacios,   [facility]  [phone]

## 2024-12-05 ENCOUNTER — OFFICE VISIT (OUTPATIENT)
Dept: FAMILY MEDICINE | Facility: CLINIC | Age: 69
End: 2024-12-05

## 2024-12-05 VITALS
BODY MASS INDEX: 28.28 KG/M2 | DIASTOLIC BLOOD PRESSURE: 62 MMHG | HEART RATE: 65 BPM | OXYGEN SATURATION: 100 % | TEMPERATURE: 97.5 F | WEIGHT: 186 LBS | SYSTOLIC BLOOD PRESSURE: 132 MMHG

## 2024-12-05 DIAGNOSIS — Z96.651 STATUS POST TOTAL RIGHT KNEE REPLACEMENT: Primary | ICD-10-CM

## 2024-12-05 NOTE — PROGRESS NOTES
"Here for follow up on the TKA  this past Monday.  Feeling very well,  No fever no bleeding.    ,  Assessment & Plan     There are no diagnoses linked to this encounter.          BMI  Estimated body mass index is 28.28 kg/m  as calculated from the following:    Height as of 5/3/24: 1.727 m (5' 8\").    Weight as of this encounter: 84.4 kg (186 lb).         Work on weight loss    No follow-ups on file.    Kristi Mc is a 69 year old, presenting for the following health issues:  Surgical Followup (Right Knee surgery follow up, surgery 11/25/24)    HPI       Follow-up Visit:  Surgery follow up  Date : 12/2    Reason(s) for Admission: new knee  Was the patient in the ICU or did the patient experience delirium during hospitalization?  No  Do you have any other stressors you would like to discuss with your provider? No    Problems taking medications regularly:  None  Medication changes since discharge: None  Problems adhering to non-medication therapy:  None    Diagnostic Tests/Treatments reviewed.  Follow up needed: none  Other Healthcare Providers Involved in Patient s Care:         None  Update since discharge: improved.         Plan of care communicated with patient                   Review of Systems  Constitutional, HEENT, cardiovascular, pulmonary, gi and gu systems are negative, except as otherwise noted.      Objective    /62   Pulse 65   Temp 97.5  F (36.4  C) (Oral)   Wt 84.4 kg (186 lb)   SpO2 100%   BMI 28.28 kg/m    Body mass index is 28.28 kg/m .  Physical Exam   GENERAL: alert and no distress  NECK: no adenopathy, no asymmetry, masses, or scars  RESP: lungs clear to auscultation - no rales, rhonchi or wheezes  CV: regular rate and rhythm, normal S1 S2, no S3 or S4, no murmur, click or rub, no peripheral edema  ABDOMEN: soft, nontender, no hepatosplenomegaly, no masses and bowel sounds normal  MS: no gross musculoskeletal defects noted, no edema     The longitudinal plan of care for the " diagnosis(es)/condition(s) as documented were addressed during this visit. Due to the added complexity in care, I will continue to support Alexandro in the subsequent management and with ongoing continuity of care.    Signed Electronically by: Filemon Magana MD

## 2024-12-08 DIAGNOSIS — I10 BENIGN ESSENTIAL HYPERTENSION: ICD-10-CM

## 2024-12-09 RX ORDER — LISINOPRIL 20 MG/1
20 TABLET ORAL DAILY
Qty: 90 TABLET | Refills: 3 | Status: SHIPPED | OUTPATIENT
Start: 2024-12-09

## 2024-12-23 DIAGNOSIS — I10 BENIGN ESSENTIAL HYPERTENSION: ICD-10-CM

## 2024-12-23 RX ORDER — HYDROCHLOROTHIAZIDE 50 MG/1
50 TABLET ORAL DAILY
Qty: 90 TABLET | Refills: 1 | Status: SHIPPED | OUTPATIENT
Start: 2024-12-23

## 2025-01-25 ENCOUNTER — HEALTH MAINTENANCE LETTER (OUTPATIENT)
Age: 70
End: 2025-01-25

## 2025-02-12 ENCOUNTER — TELEPHONE (OUTPATIENT)
Dept: FAMILY MEDICINE | Facility: CLINIC | Age: 70
End: 2025-02-12

## 2025-05-05 ENCOUNTER — OFFICE VISIT (OUTPATIENT)
Dept: FAMILY MEDICINE | Facility: CLINIC | Age: 70
End: 2025-05-05

## 2025-05-05 VITALS
SYSTOLIC BLOOD PRESSURE: 107 MMHG | OXYGEN SATURATION: 98 % | BODY MASS INDEX: 28.4 KG/M2 | HEART RATE: 72 BPM | DIASTOLIC BLOOD PRESSURE: 51 MMHG | WEIGHT: 186.8 LBS

## 2025-05-05 DIAGNOSIS — F11.21 NARCOTIC DEPENDENCE, IN REMISSION (H): ICD-10-CM

## 2025-05-05 DIAGNOSIS — N48.1 BALANITIS: ICD-10-CM

## 2025-05-05 DIAGNOSIS — L40.59 POLYARTICULAR PSORIATIC ARTHRITIS (H): ICD-10-CM

## 2025-05-05 DIAGNOSIS — R04.0 NASAL BLEEDING: Primary | ICD-10-CM

## 2025-05-05 PROCEDURE — G2211 COMPLEX E/M VISIT ADD ON: HCPCS | Performed by: FAMILY MEDICINE

## 2025-05-05 PROCEDURE — 99214 OFFICE O/P EST MOD 30 MIN: CPT | Performed by: FAMILY MEDICINE

## 2025-05-05 PROCEDURE — 3074F SYST BP LT 130 MM HG: CPT | Performed by: FAMILY MEDICINE

## 2025-05-05 PROCEDURE — 3078F DIAST BP <80 MM HG: CPT | Performed by: FAMILY MEDICINE

## 2025-05-05 RX ORDER — NYSTATIN AND TRIAMCINOLONE ACETONIDE 100000; 1 [USP'U]/G; MG/G
CREAM TOPICAL 2 TIMES DAILY PRN
Qty: 15 G | Refills: 4 | Status: SHIPPED | OUTPATIENT
Start: 2025-05-05

## 2025-05-05 NOTE — PROGRESS NOTES
Answers submitted by the patient for this visit:  General Questionnaire (Submitted on 5/5/2025)  Chief Complaint: Chronic problems general questions HPI Form  How many days per week do you miss taking your medication?: 0  General Concern (Submitted on 5/5/2025)  Chief Complaint: Chronic problems general questions HPI Form  What is the reason for your visit today?: blood in my nose and throat  When did your symptoms begin?: 3-4 weeks ago  How would you describe these symptoms?: Mild  Are your symptoms:: Worsening  Have you had these symptoms before?: No  Questionnaire about: Chronic problems general questions HPI Form (Submitted on 5/5/2025)  Chief Complaint: Chronic problems general questions HPI Form      Subjective     Mane is a 69 year old patient who presents to clinic for evaluation.  Noted he had some phlegm in his throat when he woke up this morning.  Spit it out and it was bloody.  Has intermittently had right nasal bleeding.  Does endorse allergies.  No cough.  No fever, chills or pain.  Not using nasal sprays.  Is on asa 81.    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  Multiple joint issues and had recent knee replacement.        Review of Systems   Constitutional, HEENT, cardiovascular, pulmonary, GI, , musculoskeletal, neuro, skin, endocrine and psych systems are negative, except as otherwise noted.      Objective    /51   Pulse 72   Wt 84.7 kg (186 lb 12.8 oz)   SpO2 98%   BMI 28.40 kg/m      General: Well appearing, NAD  HEENT: right nares with some swelling internally but no visible bleeding.  Left nares clear.  Oropharynx clear  Chest: Lungs CTAB  Psych: normal mood and affect        Nasal bleeding  Reassuring exam  Treat allergies  If nasal bleeding persists can refer to ENT    Balanitis  Intermittent but recurrent issue, requests refill  - nystatin-triamcinolone (MYCOLOG II) 624108-6.1 UNIT/GM-% external cream; Apply topically 2 times daily as  needed (rash).    Polyarticular psoriatic arthritis (H)  Stable  Cont current medication and rheum follow up    Narcotic dependence, in remission (H)  Remains in remission, doing well

## 2025-06-19 DIAGNOSIS — I10 BENIGN ESSENTIAL HYPERTENSION: ICD-10-CM

## 2025-06-19 NOTE — TELEPHONE ENCOUNTER
Med: hydrochlorothiazide      LOV (related): 11/18/24    Last Lab: 5/13/24      Due for F/U around:  overdue for CPX   Last cpx 12/21/23    Next Appt: [place] [facility]         BP Readings from Last 3 Encounters:   05/05/25 107/51   12/05/24 132/62   11/18/24 (!) 142/79       Last Comprehensive Metabolic Panel:  Lab Results   Component Value Date     05/13/2024    POTASSIUM 4.8 05/13/2024    CHLORIDE 106 05/13/2024    CO2 14 (L) 05/13/2024    ANIONGAP 16 (H) 05/13/2024     (H) 05/13/2024    BUN 20.9 05/13/2024    CR 1.19 (H) 05/13/2024    GFRESTIMATED 66 05/13/2024    LEIGHA 9.0 05/13/2024

## 2025-06-22 RX ORDER — HYDROCHLOROTHIAZIDE 50 MG/1
50 TABLET ORAL DAILY
Qty: 90 TABLET | Refills: 0 | Status: SHIPPED | OUTPATIENT
Start: 2025-06-23

## 2025-06-30 ENCOUNTER — TELEPHONE (OUTPATIENT)
Dept: FAMILY MEDICINE | Facility: CLINIC | Age: 70
End: 2025-06-30

## 2025-06-30 ENCOUNTER — OFFICE VISIT (OUTPATIENT)
Dept: FAMILY MEDICINE | Facility: CLINIC | Age: 70
End: 2025-06-30

## 2025-06-30 VITALS
DIASTOLIC BLOOD PRESSURE: 53 MMHG | HEART RATE: 82 BPM | WEIGHT: 186 LBS | SYSTOLIC BLOOD PRESSURE: 98 MMHG | BODY MASS INDEX: 28.28 KG/M2 | OXYGEN SATURATION: 97 %

## 2025-06-30 DIAGNOSIS — M62.838 MUSCLE SPASM: ICD-10-CM

## 2025-06-30 DIAGNOSIS — J18.9 ATYPICAL PNEUMONIA: Primary | ICD-10-CM

## 2025-06-30 DIAGNOSIS — R05.1 ACUTE COUGH: ICD-10-CM

## 2025-06-30 PROCEDURE — G2211 COMPLEX E/M VISIT ADD ON: HCPCS

## 2025-06-30 PROCEDURE — 3078F DIAST BP <80 MM HG: CPT

## 2025-06-30 PROCEDURE — 99213 OFFICE O/P EST LOW 20 MIN: CPT

## 2025-06-30 PROCEDURE — 3074F SYST BP LT 130 MM HG: CPT

## 2025-06-30 PROCEDURE — 71046 X-RAY EXAM CHEST 2 VIEWS: CPT

## 2025-06-30 RX ORDER — AMOXICILLIN 500 MG/1
CAPSULE ORAL
Status: CANCELLED | OUTPATIENT
Start: 2025-06-30

## 2025-06-30 RX ORDER — AZITHROMYCIN 250 MG/1
TABLET, FILM COATED ORAL
Qty: 6 TABLET | Refills: 0 | Status: SHIPPED | OUTPATIENT
Start: 2025-06-30 | End: 2025-07-05

## 2025-06-30 RX ORDER — PANTOPRAZOLE SODIUM 40 MG/1
40 TABLET, DELAYED RELEASE ORAL DAILY
COMMUNITY
Start: 2024-12-02

## 2025-06-30 ASSESSMENT — ENCOUNTER SYMPTOMS: COUGH: 1

## 2025-06-30 NOTE — PROGRESS NOTES
Assessment & Plan     Atypical pneumonia  We discussed the diagnosis, pathophysiology and natural history of pneumonia. Chest xray was concerning for new right lower lobe patchy opacity. Awaiting formal cxr read. Rx for Augmentin and azithromycin. Side effects of medications discussed. May use OTC Tylenol as needed for fever or discomfort. Anticipatory guidance given. Recommend close follow up for new or worsening symptoms. Red flags that warrant emergent evaluation discussed. Patient agreeable to plan. All questions answered.     - amoxicillin-clavulanate (AUGMENTIN) 875-125 MG tablet  Dispense: 14 tablet; Refill: 0  - azithromycin (ZITHROMAX) 250 MG tablet  Dispense: 6 tablet; Refill: 0    Acute cough  - XR Chest 2 Views              Follow-up  Return if symptoms worsen or fail to improve, for Follow up.    Kristi Mc is a 69 year old, presenting for the following health issues:  Cough (Started a week ago with no sputum, yesterday coughed up brown/redish mucus. This morning after walking had chest burning and pain. )    Cough    History of Present Illness       Reason for visit:  Coughing up brownish red phlem  Symptom onset:  1-3 days ago  Symptom intensity:  Moderate  Symptom progression:  Staying the same  Had these symptoms before:  No  What makes it worse:  No  What makes it better:  No   He is taking medications regularly.          Concern - Cough  Onset: a week ago  Description: Coughing up phlegm reddish/brown that started yesterday. A week ago started with a cough. Always has some clear phlegm and cough in the morning. Was in [facility] over the weekend. 2 months ago saw Dr. Adhikari  Intensity: back pain-baseline pain  Progression of Symptoms:  same with cough  Accompanying Signs & Symptoms: No shortness of breath or fevers. No congestion or rhinorrhea. Headache from coughing. Burning in his lungs  Previous history of similar problem: none, morning baseline cough and clear phlegm never colorful  phlegm   Precipitating factors:        Worsened by: walking and carrying something.   Alleviating factors:        Improved by: ibuprofen and Tylenol  Therapies tried and outcome: ibuprofen and Tylenol with some relief in the headache.   Denies any known sick contacts. Lower blood pressure- no dizziness or light headedness.    BP Readings from Last 6 Encounters:   06/30/25 98/53   05/05/25 107/51   12/05/24 132/62   11/18/24 (!) 142/79   08/05/24 (!) 144/75   05/13/24 102/58      Review of Systems  Constitutional, HEENT, cardiovascular, pulmonary, gi and gu systems are negative, except as otherwise noted.      Objective    BP 98/53 (BP Location: Right arm)   Pulse 82   Wt 84.4 kg (186 lb)   SpO2 97%   BMI 28.28 kg/m    Body mass index is 28.28 kg/m .  Physical Exam   GENERAL: alert and no distress  HENT: ear canals and TM's normal, nose and mouth without ulcers or lesions  NECK: no adenopathy  RESP: lungs clear to auscultation - no rales, rhonchi or wheezes  CV: regular rate and rhythm, normal S1 S2, no S3 or S4, no murmur, click or rub, no peripheral edema  SKIN: no suspicious lesions or rashes  PSYCH: mentation appears normal, affect normal/bright    CXR - Reviewed and interpreted by me: concern for new patchy opacity in right lower lobe.  Awaiting formal interpretation from Radiologist at this time        Signed Electronically by: Anastacia Britt, ELIN CNP

## 2025-07-01 ENCOUNTER — RESULTS FOLLOW-UP (OUTPATIENT)
Dept: FAMILY MEDICINE | Facility: CLINIC | Age: 70
End: 2025-07-01

## 2025-07-01 NOTE — TELEPHONE ENCOUNTER
CONTROLLED SUBSTANCE REFILL REQUEST FOR DIAZEPAM   DOSE: 5 MG - # 30  SIG: TAKE 1/2 TABLET BY MOUTH AT BEDTIME AS NEEDED FOR SEVERE MUSCLE SPASMS       LAST REFILL: 3/9/25    LAST APPT RELATED: 11/18/24 PRE-OP    NEXT APPT: NONE SCHEDULED      CONTROLLED SUBSTANCE AGREEMENT: DUE    URINE DRUG SCREEN: DUE        FILL HISTORY PER :          REFILL ROUTED TO MAGALY ROSS FOR REVIEW

## 2025-07-02 ENCOUNTER — TELEPHONE (OUTPATIENT)
Dept: FAMILY MEDICINE | Facility: CLINIC | Age: 70
End: 2025-07-02

## 2025-07-02 RX ORDER — DIAZEPAM 5 MG/1
TABLET ORAL
Qty: 30 TABLET | Refills: 0 | Status: SHIPPED | OUTPATIENT
Start: 2025-07-02